# Patient Record
Sex: MALE | Race: WHITE | NOT HISPANIC OR LATINO | Employment: OTHER | URBAN - METROPOLITAN AREA
[De-identification: names, ages, dates, MRNs, and addresses within clinical notes are randomized per-mention and may not be internally consistent; named-entity substitution may affect disease eponyms.]

---

## 2017-01-19 ENCOUNTER — TELEPHONE (OUTPATIENT)
Dept: FAMILY MEDICINE CLINIC | Facility: CLINIC | Age: 72
End: 2017-01-19

## 2017-01-19 NOTE — TELEPHONE ENCOUNTER
I LET PT KNOW WE DID NOT PURPOSELY CHANGE THE DOSING DIRECTIONS AND IF HE NORMALLY TAKES 2 A DAY HE NEEDS TO KEEP DOING SO. HE WILL CALL ME BACK IN ABOUT 30 DAYS TO RESEND ANOTHER RX TO HIS MAIL ORDER.    ----- Message from Cyndee Martinez sent at 1/18/2017  9:33 AM EST -----  DIRECTIONS HAD CHANGED ON MAIL ORDER RX     SAYS 1 TAB PER DAY ( JANUMET XR )    HE ALWAYS TAKES 2 A DAY     DID DR CHANG CHANGED THIS BC HE WAS NOT NOTIFIED     PLEASE CALL HIM TO DISCUSS THIS ISSUE     [phone]

## 2017-02-21 ENCOUNTER — TRANSCRIBE ORDERS (OUTPATIENT)
Dept: ADMINISTRATIVE | Facility: HOSPITAL | Age: 72
End: 2017-02-21

## 2017-02-21 ENCOUNTER — APPOINTMENT (OUTPATIENT)
Dept: LAB | Facility: HOSPITAL | Age: 72
End: 2017-02-21

## 2017-02-21 ENCOUNTER — HOSPITAL ENCOUNTER (OUTPATIENT)
Dept: CARDIOLOGY | Facility: HOSPITAL | Age: 72
Discharge: HOME | End: 2017-02-21
Admitting: ANESTHESIOLOGY

## 2017-02-21 DIAGNOSIS — N20.0 CALCULUS OF KIDNEY: Primary | ICD-10-CM

## 2017-02-21 LAB
ANION GAP SERPL CALCULATED.3IONS-SCNC: 10.9 MMOL/L
BUN BLD-MCNC: 18 MG/DL (ref 8–23)
BUN/CREAT SERPL: 24.7 (ref 7–25)
CALCIUM SPEC-SCNC: 9.4 MG/DL (ref 8.6–10.5)
CHLORIDE SERPL-SCNC: 103 MMOL/L (ref 98–107)
CO2 SERPL-SCNC: 26.1 MMOL/L (ref 22–29)
CREAT BLD-MCNC: 0.73 MG/DL (ref 0.76–1.27)
GFR SERPL CREATININE-BSD FRML MDRD: 107 ML/MIN/1.73
GLUCOSE BLD-MCNC: 164 MG/DL (ref 65–99)
POTASSIUM BLD-SCNC: 4.4 MMOL/L (ref 3.5–5.2)
SODIUM BLD-SCNC: 140 MMOL/L (ref 136–145)

## 2017-02-21 PROCEDURE — 36415 COLL VENOUS BLD VENIPUNCTURE: CPT | Performed by: ANESTHESIOLOGY

## 2017-02-21 PROCEDURE — 93010 ELECTROCARDIOGRAM REPORT: CPT | Performed by: INTERNAL MEDICINE

## 2017-02-21 PROCEDURE — 93005 ELECTROCARDIOGRAM TRACING: CPT

## 2017-02-21 PROCEDURE — 80048 BASIC METABOLIC PNL TOTAL CA: CPT | Performed by: ANESTHESIOLOGY

## 2017-02-22 ENCOUNTER — HOSPITAL ENCOUNTER (OUTPATIENT)
Dept: OTHER | Facility: HOSPITAL | Age: 72
Setting detail: SPECIMEN
Discharge: HOME | End: 2017-02-22
Attending: UROLOGY | Admitting: UROLOGY

## 2017-02-22 LAB — SPECIMEN SOURCE: NORMAL

## 2017-04-19 ENCOUNTER — TRANSCRIBE ORDERS (OUTPATIENT)
Dept: ADMINISTRATIVE | Facility: HOSPITAL | Age: 72
End: 2017-04-19

## 2017-04-19 DIAGNOSIS — Z13.9 SCREENING: Primary | ICD-10-CM

## 2017-05-10 ENCOUNTER — HOSPITAL ENCOUNTER (OUTPATIENT)
Dept: CARDIOLOGY | Facility: HOSPITAL | Age: 72
Discharge: HOME | End: 2017-05-10

## 2017-05-10 VITALS
WEIGHT: 228 LBS | HEIGHT: 72 IN | SYSTOLIC BLOOD PRESSURE: 123 MMHG | DIASTOLIC BLOOD PRESSURE: 71 MMHG | HEART RATE: 90 BPM | BODY MASS INDEX: 30.88 KG/M2

## 2017-05-10 DIAGNOSIS — Z13.9 SCREENING: ICD-10-CM

## 2017-05-10 LAB
BH CV ECHO MEAS - DIST AO DIAM: 1.47 CM
BH CV VAS BP LEFT ARM: NORMAL MMHG
BH CV VAS BP RIGHT ARM: NORMAL MMHG
BH CV XLRA MEAS - MID AO DIAM: 1.69 CM
BH CV XLRA MEAS - PAD LEFT ABI DP: 1.08
BH CV XLRA MEAS - PAD LEFT ABI PT: 1.1
BH CV XLRA MEAS - PAD LEFT ARM: 122 MMHG
BH CV XLRA MEAS - PAD LEFT LEG DP: 134 MMHG
BH CV XLRA MEAS - PAD LEFT LEG PT: 136 MMHG
BH CV XLRA MEAS - PAD RIGHT ABI DP: 1.1
BH CV XLRA MEAS - PAD RIGHT ABI PT: 1.12
BH CV XLRA MEAS - PAD RIGHT ARM: 123 MMHG
BH CV XLRA MEAS - PAD RIGHT LEG DP: 136 MMHG
BH CV XLRA MEAS - PAD RIGHT LEG PT: 138 MMHG
BH CV XLRA MEAS - PROX AO DIAM: 1.87 CM
BH CV XLRA MEAS LEFT ICA/CCA RATIO: 0.71
BH CV XLRA MEAS LEFT MID CCA PSV: NORMAL CM/SEC
BH CV XLRA MEAS LEFT MID ICA PSV: NORMAL CM/SEC
BH CV XLRA MEAS LEFT PROX ECA PSV: NORMAL CM/SEC
BH CV XLRA MEAS RIGHT ICA/CCA RATIO: 0.71
BH CV XLRA MEAS RIGHT MID CCA PSV: NORMAL CM/SEC
BH CV XLRA MEAS RIGHT MID ICA PSV: NORMAL CM/SEC
BH CV XLRA MEAS RIGHT PROX ECA PSV: NORMAL CM/SEC

## 2017-05-10 PROCEDURE — 93799 UNLISTED CV SVC/PROCEDURE: CPT

## 2017-05-11 ENCOUNTER — TELEPHONE (OUTPATIENT)
Dept: FAMILY MEDICINE CLINIC | Facility: CLINIC | Age: 72
End: 2017-05-11

## 2017-06-02 DIAGNOSIS — Z00.00 ANNUAL PHYSICAL EXAM: Primary | ICD-10-CM

## 2017-06-05 ENCOUNTER — CLINICAL SUPPORT (OUTPATIENT)
Dept: FAMILY MEDICINE CLINIC | Facility: CLINIC | Age: 72
End: 2017-06-05

## 2017-06-05 DIAGNOSIS — E11.9 TYPE 2 DIABETES MELLITUS WITHOUT COMPLICATION, UNSPECIFIED LONG TERM INSULIN USE STATUS: Primary | ICD-10-CM

## 2017-06-05 DIAGNOSIS — Z00.00 ROUTINE HEALTH MAINTENANCE: Primary | ICD-10-CM

## 2017-06-05 DIAGNOSIS — I10 HYPERTENSION, ESSENTIAL, BENIGN: ICD-10-CM

## 2017-06-05 LAB
ALBUMIN SERPL-MCNC: 5 G/DL (ref 3.5–5.2)
ALBUMIN/GLOB SERPL: 2.4 G/DL
ALP SERPL-CCNC: 71 U/L (ref 39–117)
ALT SERPL-CCNC: 18 U/L (ref 1–41)
APPEARANCE UR: CLEAR
AST SERPL-CCNC: 15 U/L (ref 1–40)
BACTERIA #/AREA URNS HPF: NORMAL /HPF
BILIRUB SERPL-MCNC: 0.8 MG/DL (ref 0.1–1.2)
BILIRUB UR QL STRIP: NEGATIVE
BUN SERPL-MCNC: 17 MG/DL (ref 8–23)
BUN/CREAT SERPL: 22.7 (ref 7–25)
CALCIUM SERPL-MCNC: 10.2 MG/DL (ref 8.6–10.5)
CASTS URNS MICRO: NORMAL
CHLORIDE SERPL-SCNC: 97 MMOL/L (ref 98–107)
CHOLEST SERPL-MCNC: 158 MG/DL (ref 0–200)
CO2 SERPL-SCNC: 26.7 MMOL/L (ref 22–29)
COLOR UR: YELLOW
CREAT SERPL-MCNC: 0.75 MG/DL (ref 0.76–1.27)
EPI CELLS #/AREA URNS HPF: NORMAL /HPF
GLOBULIN SER CALC-MCNC: 2.1 GM/DL
GLUCOSE SERPL-MCNC: 198 MG/DL (ref 65–99)
GLUCOSE UR QL: (no result)
HBA1C MFR BLD: 7.8 %
HDLC SERPL-MCNC: 28 MG/DL (ref 40–60)
HGB UR QL STRIP: NEGATIVE
KETONES UR QL STRIP: NEGATIVE
LDLC SERPL CALC-MCNC: 61 MG/DL (ref 0–100)
LDLC/HDLC SERPL: 2.19 {RATIO}
LEUKOCYTE ESTERASE UR QL STRIP: NEGATIVE
NITRITE UR QL STRIP: NEGATIVE
PH UR STRIP: 6 [PH] (ref 5–8)
POTASSIUM SERPL-SCNC: 4.4 MMOL/L (ref 3.5–5.2)
PROT SERPL-MCNC: 7.1 G/DL (ref 6–8.5)
PROT UR QL STRIP: NEGATIVE
RBC #/AREA URNS HPF: NORMAL /HPF
SODIUM SERPL-SCNC: 138 MMOL/L (ref 136–145)
SP GR UR: 1.02 (ref 1–1.03)
TRIGL SERPL-MCNC: 344 MG/DL (ref 0–150)
UROBILINOGEN UR STRIP-MCNC: (no result) MG/DL
VLDLC SERPL CALC-MCNC: 68.8 MG/DL (ref 5–40)
WBC #/AREA URNS HPF: NORMAL /HPF

## 2017-06-05 PROCEDURE — 71020 XR CHEST PA AND LATERAL: CPT

## 2017-06-05 PROCEDURE — 85025 COMPLETE CBC W/AUTO DIFF WBC: CPT

## 2017-06-05 PROCEDURE — 93000 ELECTROCARDIOGRAM COMPLETE: CPT

## 2017-06-05 PROCEDURE — 83036 HEMOGLOBIN GLYCOSYLATED A1C: CPT

## 2017-06-28 ENCOUNTER — OFFICE VISIT (OUTPATIENT)
Dept: FAMILY MEDICINE CLINIC | Facility: CLINIC | Age: 72
End: 2017-06-28

## 2017-06-28 VITALS
OXYGEN SATURATION: 97 % | DIASTOLIC BLOOD PRESSURE: 64 MMHG | SYSTOLIC BLOOD PRESSURE: 152 MMHG | BODY MASS INDEX: 31.15 KG/M2 | HEIGHT: 72 IN | WEIGHT: 230 LBS | RESPIRATION RATE: 18 BRPM | HEART RATE: 93 BPM | TEMPERATURE: 97.5 F

## 2017-06-28 DIAGNOSIS — E11.40 TYPE 2 DIABETES MELLITUS WITH DIABETIC NEUROPATHY, WITHOUT LONG-TERM CURRENT USE OF INSULIN (HCC): ICD-10-CM

## 2017-06-28 DIAGNOSIS — E78.2 MIXED HYPERLIPIDEMIA: ICD-10-CM

## 2017-06-28 DIAGNOSIS — Z00.00 ANNUAL PHYSICAL EXAM: ICD-10-CM

## 2017-06-28 DIAGNOSIS — E11.42 DIABETIC PERIPHERAL NEUROPATHY (HCC): ICD-10-CM

## 2017-06-28 DIAGNOSIS — D72.828 GRANULOCYTOSIS: ICD-10-CM

## 2017-06-28 DIAGNOSIS — Z00.00 HEALTH MAINTENANCE EXAMINATION: Primary | ICD-10-CM

## 2017-06-28 DIAGNOSIS — K80.20 ASYMPTOMATIC CHOLELITHIASIS: ICD-10-CM

## 2017-06-28 DIAGNOSIS — I10 BENIGN ESSENTIAL HYPERTENSION: ICD-10-CM

## 2017-06-28 LAB — HEMOCCULT STL QL IA: NEGATIVE

## 2017-06-28 PROCEDURE — 82274 ASSAY TEST FOR BLOOD FECAL: CPT

## 2017-06-28 PROCEDURE — 99397 PER PM REEVAL EST PAT 65+ YR: CPT

## 2017-06-28 NOTE — PROGRESS NOTES
Jan Paiz is a 72 y.o. male. Patient is here today for   Chief Complaint   Patient presents with   • Annual Exam          Vitals:    06/28/17 1025   BP: 152/64   Pulse: 93   Resp: 18   Temp: 97.5 °F (36.4 °C)   SpO2: 97%       The following portions of the patient's history were reviewed and updated as appropriate: allergies, current medications, past family history, past medical history, past social history, past surgical history and problem list.    Past Medical History:   Diagnosis Date   • Benign essential hypertension    • Cholelithiasis    • Colon polyp    • Diabetes mellitus    • Fatigue    • H/O pulmonary emphysema    • Hyperlipidemia    • Leukocytosis       Allergies   Allergen Reactions   • Sulfa Antibiotics       Social History     Social History   • Marital status:      Spouse name: N/A   • Number of children: N/A   • Years of education: N/A     Occupational History   • Not on file.     Social History Main Topics   • Smoking status: Current Every Day Smoker     Packs/day: 2.00     Types: Cigarettes   • Smokeless tobacco: Not on file   • Alcohol use Yes   • Drug use: Not on file   • Sexual activity: Not on file     Other Topics Concern   • Not on file     Social History Narrative        Current Outpatient Prescriptions:   •  Canagliflozin (INVOKANA) 300 MG capsule, Take 300 mg by mouth daily., Disp: 90 tablet, Rfl: 3  •  Coenzyme Q10 (COQ-10) 400 MG capsule, Take by mouth., Disp: , Rfl:   •  losartan (COZAAR) 100 MG tablet, Take 1 tablet by mouth daily., Disp: 90 tablet, Rfl: 3  •  lovastatin (MEVACOR) 20 MG tablet, Take 1 tablet by mouth daily. 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        Mild fatigue   HENT: Negative for congestion, ear pain, nosebleeds, postnasal drip, rhinorrhea and sore throat.    Eyes: Negative for photophobia and visual disturbance.   Respiratory: Negative for cough, chest tightness, shortness of breath and wheezing.    Cardiovascular: Negative for chest pain, palpitations and leg swelling.   Gastrointestinal: Negative for blood in stool, constipation, diarrhea, nausea and vomiting.        The patient describes a very vague mild abdominal discomfort.  Patient has had a normal colonoscopy and CT of the abdomen in the last year or so.   Genitourinary:        Increased urinary frequency in the morning after taking his diabetic medications.  History of kidney stone but the patient has no symptoms currently and has no hematuria   Musculoskeletal:        Mild to moderate  osteoarthritic aches and pains   Neurological:        The patient does have some burning sensation and discomfort in his feet, primarily at night which is probably secondary to diabetic peripheral neuropathy.  This does not bother him during the day.   Hematological: Negative for adenopathy. Does not bruise/bleed easily.   Psychiatric/Behavioral: Negative.        Physical Exam   Constitutional: He is oriented to person, place, and time. He appears well-developed and well-nourished.   Overweight with central obesity   HENT:   Head: Normocephalic.   Right Ear: External ear normal.   Left Ear: External ear normal.   Nose: Nose normal.   Mouth/Throat: Oropharynx is clear and moist.   Eyes: Conjunctivae and EOM are normal. Pupils are equal, round, and reactive to light. No scleral icterus.   Neck:  Normal range of motion. Neck supple. No thyromegaly present.   Carotid pulses normal   Cardiovascular: Normal rate, regular rhythm, normal heart sounds and intact distal pulses.    No murmur heard.  Pulmonary/Chest: Effort normal and breath sounds normal. No respiratory distress. He has no wheezes. He has no rales.   Abdominal: Soft. Bowel sounds are normal. He exhibits no distension and no mass. There is no guarding.   No epigastric or right upper quadrant tenderness.   mild abdominal tenderness in the mid to low abdomen   Genitourinary: Rectum normal, prostate normal and penis normal. Rectal exam shows guaiac negative stool.   Musculoskeletal:   Osteoarthritic changes in multiple joints   Lymphadenopathy:     He has no cervical adenopathy.   Neurological: He is alert and oriented to person, place, and time. He has normal reflexes.   Sensation intact in feet   Skin: Skin is warm and dry.   Psychiatric: He has a normal mood and affect.   Nursing note and vitals reviewed.      ASSESSMENT  #1 annual physical examination             #2 type 2 diabetes mellitus              #3 essential hypertension         #4 mixed hyperlipidemia           #5 mild, early diabetic peripheral neuropathy          #6 asymptomatic cholelithiasis           #7 history of kidney stones            #8 osteoarthritis of multiple sites    DISCUSSION/SUMMARY  Initially the patient's blood pressure was elevated at 152 over 64.  Upon recheck the blood pressure was down to 140/60.  The patient states that his home blood pressures are usually about 1:30 systolic and I have told the patient to monitor his blood pressure on a regular basis and if his blood pressure exceeds 135 on the average he will call us.  The patient's chest x-ray shows no active disease and no change from previous chest x-ray done in 2014.  In the interim the patient has had a low dose CT screening of the chest because of history of cigarette smoking, which was normal except for  mild emphysema.  The patient's EKG does show a right bundle branch block and left anterior fascicular block but was otherwise normal and shows no change from one done last year.  Although the patient has cut his cigarette use down he continues to smoke.  The patient's diet is fair and he admits that it has not been ideal over the last several months.  The patient does continue to  exercise  about 3 days a week.  Total cholesterol is 158, triglycerides are elevated at 344, HDL cholesterol low at 28, and LDL cholesterol is 61.  The patient will continue his present dose of lovastatin but will work harder on his diet to try to reduce his triglycerides.  CMP shows an elevated fasting blood sugar of 198, which is higher than 6 months ago.  His hemoglobin A1c is also elevated at 7.8%, which is higher than it was 6 months ago when it was 6.83%.  The patient admits that his diet over the last few months is been far less than ideal and he will make every attempt to improve this.  Urinalysis was normal except for some sugar in the urine which is expected since the patient is on Invokana.  The patient's total white blood cell count is in the normal range and he has a very slight right shift on his differential.  The patient's hemoglobin is 17.5 which is down from 18.2 on last visit.  A PSA was not done because he had one done in December 2016 and it was normal at 1.250.  The patient has expressed some concern over recent reports that Invokana increases the chance for amputation and, because of this, I have asked him to check into his insurance coverage for the 2 other medications in this class.  The patient's last colonoscopy was in July 2014 and was normal.  Vascular screening test done within the last 2 months were all normal.  The patient is unsure of his immunizations and will check at home to see what he has had.  I recommended to the patient that he restart his low-dose, 81 mg, aspirin that he was taken off prior to his  kidney stone surgery.    PLAN  Recheck in 6 months with fasting CMP, lipid panel, hemoglobin A1c, PSA, hepatitis C antibody screening, urine for microalbumin and CBC    No Follow-up on file.

## 2017-07-05 ENCOUNTER — TELEPHONE (OUTPATIENT)
Dept: FAMILY MEDICINE CLINIC | Facility: CLINIC | Age: 72
End: 2017-07-05

## 2017-07-05 NOTE — TELEPHONE ENCOUNTER
Patient aware.     ----- Message from Momo Macdonald MD sent at 7/5/2017 10:47 AM EDT -----  Contact: PT   I would have him take 10 mg of Farxiga daily, which is equivalent to 300 mg of the Invokana.  Before getting a prescription filled have him use samples we have  ----- Message -----     From: Jana Lomeli MA     Sent: 6/30/2017   4:25 PM       To: Momo Macdonald MD    What dose Farxiga would you like to start him on?    ----- Message -----     From: Cheikh Sweet MA     Sent: 6/30/2017  10:31 AM       To: Jana Lomeli MA    Pt would like to change Canagliflozin (INVOKANA) 300 MG capsule to Farxiga as discussed with Dr. Macdonald at his physical appt. Pt states has called insurance company and stated it's the same price as Invokana. Would like it sent to [facility]. If have any questions please call back at [phone].

## 2017-08-01 RX ORDER — LOVASTATIN 20 MG/1
20 TABLET ORAL DAILY
Qty: 90 TABLET | Refills: 3 | Status: SHIPPED | OUTPATIENT
Start: 2017-08-01 | End: 2018-07-24 | Stop reason: SDUPTHER

## 2017-08-01 RX ORDER — LOSARTAN POTASSIUM 100 MG/1
100 TABLET ORAL DAILY
Qty: 90 TABLET | Refills: 3 | Status: SHIPPED | OUTPATIENT
Start: 2017-08-01 | End: 2017-08-09 | Stop reason: SDUPTHER

## 2017-08-09 RX ORDER — LOSARTAN POTASSIUM 100 MG/1
100 TABLET ORAL DAILY
Qty: 10 TABLET | Refills: 0 | Status: SHIPPED | OUTPATIENT
Start: 2017-08-09 | End: 2018-01-12 | Stop reason: ALTCHOICE

## 2017-10-16 ENCOUNTER — TELEPHONE (OUTPATIENT)
Dept: FAMILY MEDICINE CLINIC | Facility: CLINIC | Age: 72
End: 2017-10-16

## 2017-10-16 RX ORDER — VALSARTAN 160 MG/1
160 TABLET ORAL DAILY
Qty: 90 TABLET | Refills: 0 | Status: SHIPPED | OUTPATIENT
Start: 2017-10-16 | End: 2018-01-12 | Stop reason: ALTCHOICE

## 2017-10-16 NOTE — TELEPHONE ENCOUNTER
SENT RX TO [facility]. TRIED TO CALL PATIENT A COUPLE TIMES AND COULD NOT REACH HIM.    ----- Message from Momo Macdonald MD sent at 10/10/2017  1:38 PM EDT -----  Contact: PATIENT  The patient may have valsartan 160 mg tablets, one daily to take in place of the losartan  ----- Message -----     From: Jana Lomeli MA     Sent: 9/22/2017   3:55 PM       To: Momo Macdonald MD    PATIENT CALLED TO LET YOU KNOW HE CHECKED WITH HIS INSURANCE AND THEY SUGGESTED THE FOLLOWING ALTERNATIVES: VALSARTAN, VALSARTAN HCTZ, OR IRBESARTAN (HE IS CURRENTLY ON LOSARTAN).

## 2018-01-04 DIAGNOSIS — E11.9 TYPE 2 DIABETES MELLITUS WITHOUT COMPLICATION, UNSPECIFIED LONG TERM INSULIN USE STATUS: ICD-10-CM

## 2018-01-04 DIAGNOSIS — Z12.5 SPECIAL SCREENING FOR MALIGNANT NEOPLASM OF PROSTATE: ICD-10-CM

## 2018-01-04 DIAGNOSIS — Z11.59 NEED FOR HEPATITIS C SCREENING TEST: ICD-10-CM

## 2018-01-04 DIAGNOSIS — E78.5 HYPERLIPIDEMIA, UNSPECIFIED HYPERLIPIDEMIA TYPE: ICD-10-CM

## 2018-01-04 DIAGNOSIS — I10 ESSENTIAL HYPERTENSION: Primary | ICD-10-CM

## 2018-01-08 LAB
ALBUMIN SERPL-MCNC: 5 G/DL (ref 3.5–5.2)
ALBUMIN/CREAT UR: 135.4 MG/G CREAT (ref 0–30)
ALBUMIN/GLOB SERPL: 2.3 G/DL
ALP SERPL-CCNC: 71 U/L (ref 39–117)
ALT SERPL-CCNC: 19 U/L (ref 1–41)
AST SERPL-CCNC: 14 U/L (ref 1–40)
BASOPHILS # BLD AUTO: 0.05 10*3/MM3 (ref 0–0.2)
BASOPHILS NFR BLD AUTO: 0.5 % (ref 0–1.5)
BILIRUB SERPL-MCNC: 0.8 MG/DL (ref 0.1–1.2)
BUN SERPL-MCNC: 21 MG/DL (ref 8–23)
BUN/CREAT SERPL: 23.9 (ref 7–25)
CALCIUM SERPL-MCNC: 9.8 MG/DL (ref 8.6–10.5)
CHLORIDE SERPL-SCNC: 98 MMOL/L (ref 98–107)
CHOLEST SERPL-MCNC: 172 MG/DL (ref 0–200)
CO2 SERPL-SCNC: 29.2 MMOL/L (ref 22–29)
CREAT SERPL-MCNC: 0.88 MG/DL (ref 0.76–1.27)
CREAT UR-MCNC: 14.7 MG/DL
EOSINOPHIL # BLD AUTO: 0.49 10*3/MM3 (ref 0–0.7)
EOSINOPHIL NFR BLD AUTO: 4.7 % (ref 0.3–6.2)
ERYTHROCYTE [DISTWIDTH] IN BLOOD BY AUTOMATED COUNT: 13.4 % (ref 11.5–14.5)
GLOBULIN SER CALC-MCNC: 2.2 GM/DL
GLUCOSE SERPL-MCNC: 186 MG/DL (ref 65–99)
HBA1C MFR BLD: 7.8 % (ref 4.8–5.6)
HCT VFR BLD AUTO: 53.9 % (ref 40.4–52.2)
HCV AB S/CO SERPL IA: <0.1 S/CO RATIO (ref 0–0.9)
HDLC SERPL-MCNC: 26 MG/DL (ref 40–60)
HGB BLD-MCNC: 18.5 G/DL (ref 13.7–17.6)
IMM GRANULOCYTES # BLD: 0.04 10*3/MM3 (ref 0–0.03)
IMM GRANULOCYTES NFR BLD: 0.4 % (ref 0–0.5)
LDLC SERPL CALC-MCNC: ABNORMAL MG/DL
LDLC/HDLC SERPL: ABNORMAL {RATIO}
LYMPHOCYTES # BLD AUTO: 2.26 10*3/MM3 (ref 0.9–4.8)
LYMPHOCYTES NFR BLD AUTO: 21.5 % (ref 19.6–45.3)
MCH RBC QN AUTO: 34.2 PG (ref 27–32.7)
MCHC RBC AUTO-ENTMCNC: 34.3 G/DL (ref 32.6–36.4)
MCV RBC AUTO: 99.6 FL (ref 79.8–96.2)
MICROALBUMIN UR-MCNC: 19.9 UG/ML
MONOCYTES # BLD AUTO: 0.83 10*3/MM3 (ref 0.2–1.2)
MONOCYTES NFR BLD AUTO: 7.9 % (ref 5–12)
NEUTROPHILS # BLD AUTO: 6.84 10*3/MM3 (ref 1.9–8.1)
NEUTROPHILS NFR BLD AUTO: 65 % (ref 42.7–76)
PLATELET # BLD AUTO: 161 10*3/MM3 (ref 140–500)
POTASSIUM SERPL-SCNC: 5 MMOL/L (ref 3.5–5.2)
PROT SERPL-MCNC: 7.2 G/DL (ref 6–8.5)
PSA SERPL-MCNC: 1.07 NG/ML (ref 0–4)
RBC # BLD AUTO: 5.41 10*6/MM3 (ref 4.6–6)
SODIUM SERPL-SCNC: 140 MMOL/L (ref 136–145)
TRIGL SERPL-MCNC: 406 MG/DL (ref 0–150)
VLDLC SERPL CALC-MCNC: ABNORMAL MG/DL
WBC # BLD AUTO: 10.51 10*3/MM3 (ref 4.5–10.7)

## 2018-01-12 ENCOUNTER — OFFICE VISIT (OUTPATIENT)
Dept: FAMILY MEDICINE CLINIC | Facility: CLINIC | Age: 73
End: 2018-01-12

## 2018-01-12 VITALS
SYSTOLIC BLOOD PRESSURE: 142 MMHG | RESPIRATION RATE: 18 BRPM | OXYGEN SATURATION: 98 % | HEIGHT: 72 IN | DIASTOLIC BLOOD PRESSURE: 74 MMHG | WEIGHT: 233 LBS | TEMPERATURE: 97.9 F | HEART RATE: 92 BPM | BODY MASS INDEX: 31.56 KG/M2

## 2018-01-12 DIAGNOSIS — D75.1 POLYCYTHEMIA SECONDARY TO SMOKING: ICD-10-CM

## 2018-01-12 DIAGNOSIS — E11.42 DIABETIC PERIPHERAL NEUROPATHY (HCC): ICD-10-CM

## 2018-01-12 DIAGNOSIS — E11.40 TYPE 2 DIABETES MELLITUS WITH DIABETIC NEUROPATHY, WITHOUT LONG-TERM CURRENT USE OF INSULIN (HCC): Primary | ICD-10-CM

## 2018-01-12 DIAGNOSIS — I10 BENIGN ESSENTIAL HYPERTENSION: ICD-10-CM

## 2018-01-12 DIAGNOSIS — E78.2 MIXED HYPERLIPIDEMIA: ICD-10-CM

## 2018-01-12 PROCEDURE — 99214 OFFICE O/P EST MOD 30 MIN: CPT

## 2018-01-12 RX ORDER — VALSARTAN 320 MG/1
320 TABLET ORAL DAILY
COMMUNITY
End: 2018-01-19 | Stop reason: SDUPTHER

## 2018-01-12 RX ORDER — GABAPENTIN 100 MG/1
CAPSULE ORAL
Qty: 90 CAPSULE | Refills: 5 | Status: SHIPPED | OUTPATIENT
Start: 2018-01-12 | End: 2018-06-07 | Stop reason: ALTCHOICE

## 2018-01-12 NOTE — PROGRESS NOTES
Jan Paiz is a 72 y.o. male. Patient is here today for   Chief Complaint   Patient presents with   • Diabetes   • Hyperlipidemia   • Hypertension          Vitals:    01/12/18 0855   BP: 142/74   Pulse: 92   Resp: 18   Temp: 97.9 °F (36.6 °C)   SpO2: 98%     The following portions of the patient's history were reviewed and updated as appropriate: allergies, current medications, past family history, past medical history, past social history, past surgical history and problem list.    Past Medical History:   Diagnosis Date   • Benign essential hypertension    • Cholelithiasis    • Colon polyp    • Diabetes mellitus    • Fatigue    • H/O pulmonary emphysema    • Hyperlipidemia    • Leukocytosis       Allergies   Allergen Reactions   • Sulfa Antibiotics       Social History     Social History   • Marital status:      Spouse name: N/A   • Number of children: N/A   • Years of education: N/A     Occupational History   • Not on file.     Social History Main Topics   • Smoking status: Current Every Day Smoker     Packs/day: 2.00     Types: Cigarettes   • Smokeless tobacco: Not on file   • Alcohol use Yes   • Drug use: Not on file   • Sexual activity: Not on file     Other Topics Concern   • Not on file     Social History Narrative        Current Outpatient Prescriptions:   •  Canagliflozin (INVOKANA) 300 MG capsule, Take 300 mg by mouth daily., Disp: 90 tablet, Rfl: 3  •  Coenzyme Q10 (COQ-10) 400 MG capsule, Take by mouth., Disp: , Rfl:   •  Dapagliflozin Propanediol (FARXIGA) 10 MG tablet, Take 10 mg by mouth Daily., Disp: 90 tablet, Rfl: 3  •  lovastatin (MEVACOR) 20 MG tablet, Take 1 tablet by mouth Daily. In the evening before dinner, Disp: 90 tablet, Rfl: 3  •  Magnesium 250 MG tablet, Take  by mouth., Disp: , Rfl:   •  MULTIPLE VITAMINS PO, Take by mouth., Disp: , Rfl:   •  SITagliptin-MetFORMIN HCl ER (JANUMET XR)  MG tablet sustained-release 24 hour, TAKE 2 TABLETS BY MOUTH DAILY,  Disp: 180 tablet, Rfl: 3  •  gabapentin (NEURONTIN) 100 MG capsule, TAKE 3 CAPSULES AT BED TIME, Disp: 90 capsule, Rfl: 5  •  valsartan (DIOVAN) 320 MG tablet, Take 320 mg by mouth Daily., Disp: , Rfl:      Objective     History of Present Illness   The patient is here today for follow-up on type 2 diabetes mellitus, essential hypertension, mixed hyperlipidemia and diabetic peripheral neuropathy    Review of Systems   Constitutional:        Mild fatigue   HENT: Negative.    Respiratory: Negative for cough, shortness of breath and wheezing.    Cardiovascular: Negative for chest pain, palpitations and leg swelling.   Gastrointestinal: Negative for abdominal pain, blood in stool, constipation, diarrhea and nausea.   Genitourinary: Negative.    Musculoskeletal:        Mild arthritic aches and pains   Neurological:        The patient has pain in his feet, tingling in his feet, and occasional sharp pains in that area which are thought to be peripheral neuropathy from his diabetes but this has never been checked out with EMG   Hematological: Negative.    Psychiatric/Behavioral: Negative.        Physical Exam   Constitutional: He is oriented to person, place, and time.   Moderately overweight   Eyes: Pupils are equal, round, and reactive to light.   Neck:   Carotid pulses normal   Cardiovascular: Normal rate, regular rhythm and normal heart sounds.    Pulmonary/Chest: Effort normal and breath sounds normal. No respiratory distress. He has no wheezes. He has no rales.   Abdominal: Soft. Bowel sounds are normal.   Musculoskeletal:   Mild osteoarthritic changes in multiple joints   Neurological: He is alert and oriented to person, place, and time.   Slight decreased sensation in feet   Skin: Skin is warm and dry.   Psychiatric: He has a normal mood and affect.   Nursing note and vitals reviewed.      ASSESSMENT  #1 type 2 diabetes mellitus              #2 essential hypertension                #3 mixed hyperlipidemia             #4 probable diabetic peripheral neuropathy           #5 polycythemia ,probably secondary to chronic smoking    DISCUSSION/SUMMARY   The patient's blood pressure is 142/74.  The patient's losartan 100 mg daily was changed to valsartan 160 mg daily but his blood pressure is still a little too high.  I am going to increase his valsartan to 320 mg daily.  CMP is essentially normal except for fasting blood sugar of 186 and the patient's hemoglobin A1c is still elevated at 7.80%.  The patient states that his diet is been poor over the holidays and he wants to work harder on his diet and we will recheck this again in 6 months.  PSA is normal at 1.070.  Hepatitis C antibody titer was normal.  Total cholesterol is 172, triglycerides 46, HDL cholesterol 26 and the LDL cannot be calculated.  Urine for microalbumin was elevated but the patient is already on an angiotensin receptor blocker.  The patient generally feels well other than his chronic foot pain and tingling.  This is particularly bad at night for him.  The patient would like to try some medication so I will start him on gabapentin 100 mg at bedtime and increase this by 100 mg every week for several weeks to see if this helps.  The patient will monitor his blood pressure and let us know if the blood pressures come down and if he has any improvement with the gabapentin.  We talked about side effects from the gabapentin.  I'm going to schedule the patient for an EMG of his lower extremities.    PLAN  Annual physical exam in 6 months.  Set up EMG nerve conduction study  No Follow-up on file.

## 2018-01-19 ENCOUNTER — TELEPHONE (OUTPATIENT)
Dept: FAMILY MEDICINE CLINIC | Facility: CLINIC | Age: 73
End: 2018-01-19

## 2018-01-19 RX ORDER — VALSARTAN 320 MG/1
320 TABLET ORAL DAILY
Qty: 90 TABLET | Refills: 1 | Status: SHIPPED | OUTPATIENT
Start: 2018-01-19 | End: 2018-07-24 | Stop reason: SDUPTHER

## 2018-01-19 NOTE — TELEPHONE ENCOUNTER
LEFT VOICEMAIL STATING THAT PATIENT IS SUPPOSED TO INCREASE HIS VALSARTAN TO BID UNTIL HE RUNS OUT AND ALSO THAT I WOULD BE SENDING A NEW RX TO [facility] FOR THIS (VALSARTAN 320 MG)    ----- Message from Cheikh Sweet MA sent at 1/18/2018  3:55 PM EST -----  Contact: PT'S WIFE   PT'S WIFES STATES PT WAS SUPPOSED TO GET A REFILL ON HTN MEDICATIONS. PT'S WIFE STATES THINKS HE IS SUPPOSED TO HAVE DOUBLED IT. PLEASE CALL PT BACK @ [phone], MARICEL LORD. THANK YOU [facility].

## 2018-01-30 DIAGNOSIS — G58.9 MONONEUROPATHY: ICD-10-CM

## 2018-01-30 DIAGNOSIS — G62.9 NEUROPATHY: ICD-10-CM

## 2018-01-30 DIAGNOSIS — G62.9 PERIPHERAL POLYNEUROPATHY: Primary | ICD-10-CM

## 2018-01-30 DIAGNOSIS — G62.89 OTHER POLYNEUROPATHY: ICD-10-CM

## 2018-01-31 DIAGNOSIS — G62.9 POLYNEUROPATHY: Primary | ICD-10-CM

## 2018-01-31 DIAGNOSIS — G63 POLYNEUROPATHY IN OTHER DISEASES CLASSIFIED ELSEWHERE (HCC): ICD-10-CM

## 2018-02-14 ENCOUNTER — HOSPITAL ENCOUNTER (OUTPATIENT)
Dept: INFUSION THERAPY | Facility: HOSPITAL | Age: 73
Discharge: HOME | End: 2018-02-14
Attending: PSYCHIATRY & NEUROLOGY | Admitting: PSYCHIATRY & NEUROLOGY

## 2018-02-14 DIAGNOSIS — G63 POLYNEUROPATHY IN OTHER DISEASES CLASSIFIED ELSEWHERE (HCC): ICD-10-CM

## 2018-02-14 PROCEDURE — 95886 MUSC TEST DONE W/N TEST COMP: CPT

## 2018-02-14 PROCEDURE — 95910 NRV CNDJ TEST 7-8 STUDIES: CPT | Performed by: PSYCHIATRY & NEUROLOGY

## 2018-02-14 PROCEDURE — 95910 NRV CNDJ TEST 7-8 STUDIES: CPT

## 2018-02-14 PROCEDURE — 95886 MUSC TEST DONE W/N TEST COMP: CPT | Performed by: PSYCHIATRY & NEUROLOGY

## 2018-02-14 NOTE — PROCEDURES
EMG and Nerve Conduction Studies    Please see data sheets for details on methods, temperatures and lab standards. EMG muscles tested for upper extremity studies include the deltoid, biceps, triceps, pronator teres, extensor digitorum communis, first dorsal interosseous and abductor pollicis brevis.  EMG muscles tested for lower extremity studies include the vastus lateralis, tibialis anterior, peroneus longus, medial gastrocnemius and extensor digitorum brevis.  Additional muscles tested as needed.  Paraspinal muscles tested as needed.  The complete report includes the data sheets.    Indication: Numbness and tingling in the feet  History: 72-year-old white male with long-standing diabetes who has developed numbness and tingling in the feet.      Ht: 72 inches  Wt: Not reported  HbA1C:   Lab Results   Component Value Date    HGBA1C 7.80 (H) 01/05/2018     TSH:   Lab Results   Component Value Date    TSH 2.530 12/05/2016       Technical summary:  Nerve conduction studies were obtained in both legs.  The feet were cold prior to study approximately 30°C bilaterally.  Temperature correction was used as indicated.  Needle examination was obtained on selected muscles in both legs.    Results:  1.  Absent left sural sensory potential  2.  Absent superficial peroneal sensory potentials bilaterally.  3.  Slow left peroneal motor velocity at 36.6 m/s below the knee and 37.5 m/s in the short segment across the fibular head.  Normal distal latency and amplitudes.  Slow right peroneal motor velocity below the knee at 34.4 m/s and in the short segment across the fibular head at 35.3 m/s.  Normal distal latency and amplitudes.  4.  Normal tibial motor studies bilaterally.  5.  Needle examination of selected muscles in both legs showed fibrillations and positive sharp waves in the left tibialis anterior.  There was a mild increased number of large motor units with increased firing rates and reduced interference patterns.  All  other muscles tested in both legs showed normal insertional activities, motor units and recruitment patterns.  Lumbar paraspinals at L5 showed no abnormality on either side.    Impression:   Abnormal study showing moderate peripheral neuropathy.  Needle exam changes were consistent with the nerve conduction findings without additional findings to suggest a superimposed lumbosacral radiculopathy on either side.  Study results were discussed with the patient.    Isaiah Gutierrez M.D.              Dictated utilizing Dragon dictation.

## 2018-02-16 ENCOUNTER — TELEPHONE (OUTPATIENT)
Dept: FAMILY MEDICINE CLINIC | Facility: CLINIC | Age: 73
End: 2018-02-16

## 2018-02-16 NOTE — TELEPHONE ENCOUNTER
Patient notified.    ----- Message from Momo Macdonald MD sent at 2/14/2018  3:01 PM EST -----  Tell patient that his EMG does show a peripheral neuropathy, most probably secondary to diabetes.  Hopefully the gabapentin will start helping him.  He should let me know if this is helping him in the next couple of weeks.

## 2018-06-07 ENCOUNTER — TELEPHONE (OUTPATIENT)
Dept: FAMILY MEDICINE CLINIC | Facility: CLINIC | Age: 73
End: 2018-06-07

## 2018-06-07 RX ORDER — GABAPENTIN 300 MG/1
300 CAPSULE ORAL 3 TIMES DAILY
COMMUNITY
End: 2018-12-11 | Stop reason: SDUPTHER

## 2018-06-07 NOTE — TELEPHONE ENCOUNTER
PATIENT NOTIFIED, NEW DOSE CALLED IN.    ----- Message from Momo Macdonald MD sent at 6/7/2018 11:37 AM EDT -----  Tell patient that we need to increase his dose of gabapentin.  Send in prescription for 300 mg capsules, one 3 times a day.  Tell the patient that if this causes too much drowsiness during the day take only one in the morning and 2 at night instead of 1 capsule 3 times a day  ----- Message -----  From: Jana Lomeli MA  Sent: 6/6/2018   2:42 PM  To: Momo Macdonald MD    Patient is on 100 mg capsules, 3 capsules at bedtime    ----- Message -----  From: Kassi Dior  Sent: 6/5/2018  10:21 AM  To: Jana Lomeli MA    PATIENT WANTED TO TALK YOU OR DR MACDONALD ABOUT HIS GABAPENTIN DOSAGE, HE THINKS IT MAY BE TO LOW. PAIN KEEPS HIM AWAKE AT NIGHT. PLEASE CALL PT BACK [phone]    THANK YOU

## 2018-07-06 DIAGNOSIS — E11.9 TYPE 2 DIABETES MELLITUS WITHOUT COMPLICATION, WITHOUT LONG-TERM CURRENT USE OF INSULIN (HCC): ICD-10-CM

## 2018-07-06 DIAGNOSIS — Z00.00 ROUTINE HEALTH MAINTENANCE: Primary | ICD-10-CM

## 2018-07-10 DIAGNOSIS — Z00.00 ROUTINE HEALTH MAINTENANCE: Primary | ICD-10-CM

## 2018-07-12 ENCOUNTER — CLINICAL SUPPORT (OUTPATIENT)
Dept: FAMILY MEDICINE CLINIC | Facility: CLINIC | Age: 73
End: 2018-07-12

## 2018-07-12 DIAGNOSIS — Z00.00 ROUTINE HEALTH MAINTENANCE: Primary | ICD-10-CM

## 2018-07-12 DIAGNOSIS — I10 BENIGN ESSENTIAL HYPERTENSION: ICD-10-CM

## 2018-07-12 LAB
ALBUMIN SERPL-MCNC: 4.9 G/DL (ref 3.5–5.2)
ALBUMIN/GLOB SERPL: 2.6 G/DL
ALP SERPL-CCNC: 76 U/L (ref 39–117)
ALT SERPL-CCNC: 19 U/L (ref 1–41)
APPEARANCE UR: CLEAR
AST SERPL-CCNC: 12 U/L (ref 1–40)
BILIRUB SERPL-MCNC: 0.5 MG/DL (ref 0.1–1.2)
BILIRUB UR QL STRIP: NEGATIVE
BUN SERPL-MCNC: 21 MG/DL (ref 8–23)
BUN/CREAT SERPL: 25.6 (ref 7–25)
CALCIUM SERPL-MCNC: 9.3 MG/DL (ref 8.6–10.5)
CHLORIDE SERPL-SCNC: 95 MMOL/L (ref 98–107)
CHOLEST SERPL-MCNC: 162 MG/DL (ref 0–200)
CO2 SERPL-SCNC: 25 MMOL/L (ref 22–29)
COLOR UR: YELLOW
CREAT SERPL-MCNC: 0.82 MG/DL (ref 0.76–1.27)
GLOBULIN SER CALC-MCNC: 1.9 GM/DL
GLUCOSE SERPL-MCNC: 185 MG/DL (ref 65–99)
GLUCOSE UR QL: ABNORMAL
HBA1C MFR BLD: 9.26 % (ref 4.8–5.6)
HDLC SERPL-MCNC: 28 MG/DL (ref 40–60)
HGB UR QL STRIP: NEGATIVE
KETONES UR QL STRIP: NEGATIVE
LDLC SERPL CALC-MCNC: 56 MG/DL (ref 0–100)
LDLC/HDLC SERPL: 2.01 {RATIO}
LEUKOCYTE ESTERASE UR QL STRIP: NEGATIVE
NITRITE UR QL STRIP: NEGATIVE
PH UR STRIP: 6 [PH] (ref 5–8)
POTASSIUM SERPL-SCNC: 4.2 MMOL/L (ref 3.5–5.2)
PROT SERPL-MCNC: 6.8 G/DL (ref 6–8.5)
PROT UR QL STRIP: NEGATIVE
SODIUM SERPL-SCNC: 135 MMOL/L (ref 136–145)
SP GR UR: 1.01 (ref 1–1.03)
TRIGL SERPL-MCNC: 388 MG/DL (ref 0–150)
UROBILINOGEN UR STRIP-MCNC: ABNORMAL MG/DL
VLDLC SERPL CALC-MCNC: 77.6 MG/DL (ref 5–40)

## 2018-07-12 PROCEDURE — 71046 X-RAY EXAM CHEST 2 VIEWS: CPT

## 2018-07-12 PROCEDURE — 93000 ELECTROCARDIOGRAM COMPLETE: CPT

## 2018-07-12 PROCEDURE — 85025 COMPLETE CBC W/AUTO DIFF WBC: CPT

## 2018-07-24 RX ORDER — VALSARTAN 320 MG/1
320 TABLET ORAL DAILY
Qty: 90 TABLET | Refills: 3 | Status: SHIPPED | OUTPATIENT
Start: 2018-07-24 | End: 2018-07-26 | Stop reason: SDUPTHER

## 2018-07-24 RX ORDER — LOVASTATIN 20 MG/1
20 TABLET ORAL DAILY
Qty: 90 TABLET | Refills: 3 | Status: SHIPPED | OUTPATIENT
Start: 2018-07-24 | End: 2019-06-11 | Stop reason: SDUPTHER

## 2018-07-26 ENCOUNTER — TELEPHONE (OUTPATIENT)
Dept: FAMILY MEDICINE CLINIC | Facility: CLINIC | Age: 73
End: 2018-07-26

## 2018-07-26 RX ORDER — VALSARTAN 320 MG/1
320 TABLET ORAL DAILY
Qty: 90 TABLET | Refills: 3 | Status: SHIPPED | OUTPATIENT
Start: 2018-07-26 | End: 2018-08-20 | Stop reason: ALTCHOICE

## 2018-07-26 NOTE — TELEPHONE ENCOUNTER
I CALLED PATIENT AND LET HIM KNOW I WOULD SEND THIS PRESCRIPTION TO [facility] BECAUSE THEY HAVE VALSARTAN AVAILABLE THAT HASN'T BEEN RECALLED.    ----- Message from Kathleen Martinez MA sent at 7/26/2018  1:26 PM EDT -----  Contact: LINDA ELDRIDGE CALLED WANTING TO KNOW IF THEY COULD FILL SOMETHING ELSE FOR THE PT BECAUSE THE PT IS ON VALSARTAN AND THEY DON'T HAVE THE VERSION THAT IS NOT RECALLED / LINDA CAN BE REACHED [phone]

## 2018-08-07 ENCOUNTER — OFFICE VISIT (OUTPATIENT)
Dept: FAMILY MEDICINE CLINIC | Facility: CLINIC | Age: 73
End: 2018-08-07

## 2018-08-07 VITALS
WEIGHT: 228.8 LBS | OXYGEN SATURATION: 96 % | TEMPERATURE: 98.2 F | HEIGHT: 72 IN | BODY MASS INDEX: 30.99 KG/M2 | DIASTOLIC BLOOD PRESSURE: 66 MMHG | SYSTOLIC BLOOD PRESSURE: 118 MMHG | HEART RATE: 95 BPM

## 2018-08-07 DIAGNOSIS — Z00.00 ANNUAL PHYSICAL EXAM: Primary | ICD-10-CM

## 2018-08-07 DIAGNOSIS — E78.2 MIXED HYPERLIPIDEMIA: ICD-10-CM

## 2018-08-07 DIAGNOSIS — E11.40 TYPE 2 DIABETES MELLITUS WITH DIABETIC NEUROPATHY, WITHOUT LONG-TERM CURRENT USE OF INSULIN (HCC): ICD-10-CM

## 2018-08-07 DIAGNOSIS — E11.42 DIABETIC PERIPHERAL NEUROPATHY (HCC): ICD-10-CM

## 2018-08-07 DIAGNOSIS — I10 BENIGN ESSENTIAL HYPERTENSION: ICD-10-CM

## 2018-08-07 DIAGNOSIS — D72.828 GRANULOCYTOSIS: ICD-10-CM

## 2018-08-07 DIAGNOSIS — R53.83 FATIGUE, UNSPECIFIED TYPE: ICD-10-CM

## 2018-08-07 LAB — HEMOCCULT STL QL IA: NEGATIVE

## 2018-08-07 PROCEDURE — 82274 ASSAY TEST FOR BLOOD FECAL: CPT

## 2018-08-07 PROCEDURE — 99397 PER PM REEVAL EST PAT 65+ YR: CPT

## 2018-08-07 NOTE — PROGRESS NOTES
Jan Paiz is a 73 y.o. male. Patient is here today for   Chief Complaint   Patient presents with   • Annual Exam          Vitals:    08/07/18 1030   BP: 118/66   Pulse: 95   Temp: 98.2 °F (36.8 °C)   SpO2: 96%     The following portions of the patient's history were reviewed and updated as appropriate: allergies, current medications, past family history, past medical history, past social history, past surgical history and problem list.    Past Medical History:   Diagnosis Date   • Benign essential hypertension    • Cholelithiasis    • Colon polyp    • Diabetes mellitus (CMS/HCC)    • Fatigue    • H/O pulmonary emphysema    • Hyperlipidemia    • Leukocytosis       Allergies   Allergen Reactions   • Sulfa Antibiotics       Social History     Social History   • Marital status:      Spouse name: N/A   • Number of children: N/A   • Years of education: N/A     Occupational History   • Not on file.     Social History Main Topics   • Smoking status: Current Every Day Smoker     Packs/day: 2.00     Types: Cigarettes   • Smokeless tobacco: Never Used   • Alcohol use Yes   • Drug use: Unknown   • Sexual activity: Not on file     Other Topics Concern   • Not on file     Social History Narrative   • No narrative on file        Current Outpatient Prescriptions:   •  aspirin 81 MG chewable tablet, Chew 81 mg Daily., Disp: , Rfl:   •  Coenzyme Q10 (COQ-10) 400 MG capsule, Take by mouth., Disp: , Rfl:   •  Dapagliflozin Propanediol (FARXIGA) 10 MG tablet, Take 10 mg by mouth Daily., Disp: 90 tablet, Rfl: 3  •  gabapentin (NEURONTIN) 300 MG capsule, Take 300 mg by mouth 3 (Three) Times a Day., Disp: , Rfl:   •  lovastatin (MEVACOR) 20 MG tablet, Take 1 tablet by mouth Daily. In the evening before dinner, Disp: 90 tablet, Rfl: 3  •  MULTIPLE VITAMINS PO, Take by mouth., Disp: , Rfl:   •  naproxen sodium (ALEVE) 220 MG tablet, Take 220 mg by mouth 2 (Two) Times a Day As Needed., Disp: , Rfl:   •   SITagliptin-MetFORMIN HCl ER (JANUMET XR)  MG tablet, TAKE 2 TABLETS BY MOUTH DAILY, Disp: 180 tablet, Rfl: 3  •  valsartan (DIOVAN) 320 MG tablet, Take 1 tablet by mouth Daily., Disp: 90 tablet, Rfl: 3     Objective     History of Present Illness   The patient is here today for annual physical exam    Review of Systems   Constitutional: Negative for chills and fever.        Moderate fatigue in the afternoons.  Patient states that he does not sleep well during the night because of arthritic aches and pains waking him up during the night   HENT: Negative for congestion, ear discharge, ear pain, hearing loss, postnasal drip and sore throat.    Eyes: Negative for photophobia and visual disturbance.   Respiratory: Negative for cough, shortness of breath and wheezing.    Cardiovascular: Negative for chest pain, palpitations and leg swelling.   Gastrointestinal: Negative for abdominal distention, abdominal pain, blood in stool, constipation, diarrhea and nausea.   Genitourinary: Negative for difficulty urinating, dysuria, frequency and hematuria.   Musculoskeletal:        The patient does have mild to moderate osteoarthritic aches and pains in various joints.   Neurological: Negative for headaches.        The patient does have evidence of a peripheral neuropathy involving his feet,  As confirmed by EMG done earlier this year   Hematological: Negative for adenopathy. Does not bruise/bleed easily.   Psychiatric/Behavioral: Negative.        Physical Exam   Constitutional: He is oriented to person, place, and time. He appears well-developed and well-nourished. No distress.   Moderately overweight   HENT:   Head: Normocephalic.   Right Ear: External ear normal.   Left Ear: External ear normal.   Nose: Nose normal.   Mouth/Throat: Oropharynx is clear and moist.   Eyes: Pupils are equal, round, and reactive to light. Conjunctivae are normal. No scleral icterus.   Neck: No thyromegaly present.   Carotid pulses normal    Cardiovascular: Normal rate, regular rhythm, normal heart sounds and intact distal pulses.  Exam reveals no friction rub.    No murmur heard.  Pulmonary/Chest: Effort normal and breath sounds normal. No respiratory distress. He has no wheezes. He has no rales.   Abdominal: Soft. Bowel sounds are normal. He exhibits no distension and no mass. There is no tenderness. There is no guarding.   Genitourinary: Rectum normal, prostate normal and penis normal. Rectal exam shows guaiac negative stool.   Musculoskeletal: He exhibits no edema.   Osteoarthritic changes in multiple joints   Lymphadenopathy:     He has no cervical adenopathy.   Neurological: He is alert and oriented to person, place, and time.   The patient's sensation in his feet seems only slightly impaired    Skin: Skin is warm and dry. He is not diaphoretic.   Psychiatric: He has a normal mood and affect.   Nursing note and vitals reviewed.      ASSESSMENT  #1 annual physical exam                  #2 type 2 diabetes mellitus                      #3 mixed hyperlipidemia                #4  diabetic peripheral neuropathy                  #5 essential hypertension           #6 mild granulocytosis               #7 fatigue    DISCUSSION/SUMMARY   Vital signs are normal.  Chest x-ray shows no active disease and no change from multiple previous chest x-rays, most recently June 2017.  EKG shows left axis deviation, right bundle branch block, T-wave changes in the inferior leads.  EKG has been the same now for over 10 years.  The patient's CBC was normal except for mild elevation of the white count at about 13,000 with a slight right shift.  CMP shows a fasting blood sugar of 185, sodium 135, chloride 95 but was otherwise normal.  Unfortunately the patient's hemoglobin A1c has gone up significantly from 7 months ago.  His hemoglobin A1c is 9.26%, up from 7.80%.  The patient admits that he has not been on a good diet.  The patient is currently on Janumet X are 50/1002  daily and Farxiga 10 mg daily.  Total cholesterol is 162, triglycerides 388, HDL cholesterol 28 and  calculated LDL is 56.  The patient continues to have symptoms of diabetic peripheral neuropathy in his feet.  He has some numbness and slight tingling as well as some pain off and on.  He is currently taking gabapentin 900 mg total per day.  The patient's main complaint is fatigue in the afternoon and his peripheral neuropathy.  Because his hemoglobin A1c is quite high I explained to him that his blood sugars overall have been running high and this could've increased his feeling of fatigue.  The patient does continue to smoke 2 packs cigarettes per day.  The patient's last colonoscopy was 4 years ago and it was normal.  The colonoscopy prior to this did show colon polyps.  The patient is to have another colonoscopy in 2019.  The patient last saw his ophthalmologist about a year and a half ago and is going to make an appointment for follow-up on his eye exam.    PLAN  Recheck in 3 months with fasting CMP, lipid panel, HbA1c, CBC, free T4 and TSH  No Follow-up on file.

## 2018-08-20 ENCOUNTER — TELEPHONE (OUTPATIENT)
Dept: FAMILY MEDICINE CLINIC | Facility: CLINIC | Age: 73
End: 2018-08-20

## 2018-08-20 RX ORDER — IRBESARTAN 300 MG/1
300 TABLET ORAL NIGHTLY
Qty: 90 TABLET | Refills: 3 | Status: SHIPPED | OUTPATIENT
Start: 2018-08-20 | End: 2019-07-15 | Stop reason: SDUPTHER

## 2018-08-20 NOTE — TELEPHONE ENCOUNTER
Done     ----- Message from Momo Macdonald MD sent at 8/20/2018 12:42 PM EDT -----  Assuming his insurance covers it change him to irbesartan 300 mg tablets one daily  ----- Message -----  From: Jana Lomeli MA  Sent: 8/20/2018  11:08 AM  To: Momo Macdonald MD        ----- Message -----  From: Kassi Dior  Sent: 8/20/2018  10:57 AM  To: Jana Lomeli MA    TAKES VALSARTAN AND WANTED TO HAVE SOMETHING ELSE CALLED IN.      [facility]    PLEASE CALL PT BACK WITH ANY QUESTIONS  [phone]

## 2018-11-05 DIAGNOSIS — E11.9 TYPE 2 DIABETES MELLITUS WITHOUT COMPLICATION, UNSPECIFIED WHETHER LONG TERM INSULIN USE (HCC): ICD-10-CM

## 2018-11-05 DIAGNOSIS — E78.5 HYPERLIPIDEMIA, UNSPECIFIED HYPERLIPIDEMIA TYPE: Primary | ICD-10-CM

## 2018-11-05 DIAGNOSIS — I10 ESSENTIAL HYPERTENSION: ICD-10-CM

## 2018-11-08 LAB
ALBUMIN SERPL-MCNC: 4.4 G/DL (ref 3.5–5.2)
ALBUMIN/GLOB SERPL: 1.8 G/DL
ALP SERPL-CCNC: 76 U/L (ref 39–117)
ALT SERPL-CCNC: 19 U/L (ref 1–41)
AST SERPL-CCNC: 11 U/L (ref 1–40)
BASOPHILS # BLD AUTO: 0.04 10*3/MM3 (ref 0–0.2)
BASOPHILS NFR BLD AUTO: 0.4 % (ref 0–1.5)
BILIRUB SERPL-MCNC: 0.7 MG/DL (ref 0.1–1.2)
BUN SERPL-MCNC: 19 MG/DL (ref 8–23)
BUN/CREAT SERPL: 23.5 (ref 7–25)
CALCIUM SERPL-MCNC: 9.6 MG/DL (ref 8.6–10.5)
CHLORIDE SERPL-SCNC: 101 MMOL/L (ref 98–107)
CHOLEST SERPL-MCNC: 160 MG/DL (ref 0–200)
CO2 SERPL-SCNC: 26.3 MMOL/L (ref 22–29)
CREAT SERPL-MCNC: 0.81 MG/DL (ref 0.76–1.27)
EOSINOPHIL # BLD AUTO: 0.5 10*3/MM3 (ref 0–0.7)
EOSINOPHIL NFR BLD AUTO: 4.4 % (ref 0.3–6.2)
ERYTHROCYTE [DISTWIDTH] IN BLOOD BY AUTOMATED COUNT: 13.4 % (ref 11.5–14.5)
GLOBULIN SER CALC-MCNC: 2.4 GM/DL
GLUCOSE SERPL-MCNC: 196 MG/DL (ref 65–99)
HBA1C MFR BLD: 8.1 % (ref 4.8–5.6)
HCT VFR BLD AUTO: 53.7 % (ref 40.4–52.2)
HDLC SERPL-MCNC: 28 MG/DL (ref 40–60)
HGB BLD-MCNC: 18.7 G/DL (ref 13.7–17.6)
IMM GRANULOCYTES # BLD: 0.06 10*3/MM3 (ref 0–0.03)
IMM GRANULOCYTES NFR BLD: 0.5 % (ref 0–0.5)
LDLC SERPL CALC-MCNC: ABNORMAL MG/DL
LDLC/HDLC SERPL: ABNORMAL {RATIO}
LYMPHOCYTES # BLD AUTO: 2.36 10*3/MM3 (ref 0.9–4.8)
LYMPHOCYTES NFR BLD AUTO: 20.8 % (ref 19.6–45.3)
MCH RBC QN AUTO: 34.1 PG (ref 27–32.7)
MCHC RBC AUTO-ENTMCNC: 34.8 G/DL (ref 32.6–36.4)
MCV RBC AUTO: 98 FL (ref 79.8–96.2)
MONOCYTES # BLD AUTO: 0.84 10*3/MM3 (ref 0.2–1.2)
MONOCYTES NFR BLD AUTO: 7.4 % (ref 5–12)
NEUTROPHILS # BLD AUTO: 7.63 10*3/MM3 (ref 1.9–8.1)
NEUTROPHILS NFR BLD AUTO: 67 % (ref 42.7–76)
PLATELET # BLD AUTO: 140 10*3/MM3 (ref 140–500)
POTASSIUM SERPL-SCNC: 5 MMOL/L (ref 3.5–5.2)
PROT SERPL-MCNC: 6.8 G/DL (ref 6–8.5)
RBC # BLD AUTO: 5.48 10*6/MM3 (ref 4.6–6)
SODIUM SERPL-SCNC: 141 MMOL/L (ref 136–145)
T4 FREE SERPL-MCNC: 1.48 NG/DL (ref 0.93–1.7)
TRIGL SERPL-MCNC: 413 MG/DL (ref 0–150)
TSH SERPL DL<=0.005 MIU/L-ACNC: 1.64 MIU/ML (ref 0.27–4.2)
VLDLC SERPL CALC-MCNC: ABNORMAL MG/DL
WBC # BLD AUTO: 11.37 10*3/MM3 (ref 4.5–10.7)

## 2018-11-14 ENCOUNTER — OFFICE VISIT (OUTPATIENT)
Dept: FAMILY MEDICINE CLINIC | Facility: CLINIC | Age: 73
End: 2018-11-14

## 2018-11-14 VITALS
HEIGHT: 72 IN | WEIGHT: 232 LBS | HEART RATE: 91 BPM | SYSTOLIC BLOOD PRESSURE: 132 MMHG | BODY MASS INDEX: 31.42 KG/M2 | TEMPERATURE: 97.6 F | OXYGEN SATURATION: 97 % | DIASTOLIC BLOOD PRESSURE: 76 MMHG | RESPIRATION RATE: 18 BRPM

## 2018-11-14 DIAGNOSIS — D75.1 POLYCYTHEMIA SECONDARY TO SMOKING: ICD-10-CM

## 2018-11-14 DIAGNOSIS — I10 BENIGN ESSENTIAL HYPERTENSION: ICD-10-CM

## 2018-11-14 DIAGNOSIS — E78.2 MIXED HYPERLIPIDEMIA: ICD-10-CM

## 2018-11-14 DIAGNOSIS — E11.40 TYPE 2 DIABETES MELLITUS WITH DIABETIC NEUROPATHY, WITHOUT LONG-TERM CURRENT USE OF INSULIN (HCC): Primary | ICD-10-CM

## 2018-11-14 DIAGNOSIS — E11.42 DIABETIC PERIPHERAL NEUROPATHY (HCC): ICD-10-CM

## 2018-11-14 DIAGNOSIS — R53.83 FATIGUE, UNSPECIFIED TYPE: ICD-10-CM

## 2018-11-14 PROCEDURE — 99214 OFFICE O/P EST MOD 30 MIN: CPT

## 2018-11-14 NOTE — PROGRESS NOTES
Jan Paiz is a 73 y.o. male. Patient is here today for   Chief Complaint   Patient presents with   • Diabetes   • Hyperlipidemia   • Hypertension          Vitals:    11/14/18 0947   BP: 132/76   Pulse: 91   Resp: 18   Temp: 97.6 °F (36.4 °C)   SpO2: 97%     The following portions of the patient's history were reviewed and updated as appropriate: allergies, current medications, past family history, past medical history, past social history, past surgical history and problem list.    Past Medical History:   Diagnosis Date   • Benign essential hypertension    • Cholelithiasis    • Colon polyp    • Diabetes mellitus (CMS/HCC)    • Fatigue    • H/O pulmonary emphysema    • Hyperlipidemia    • Leukocytosis       Allergies   Allergen Reactions   • Sulfa Antibiotics       Social History     Socioeconomic History   • Marital status:      Spouse name: Not on file   • Number of children: Not on file   • Years of education: Not on file   • Highest education level: Not on file   Social Needs   • Financial resource strain: Not on file   • Food insecurity - worry: Not on file   • Food insecurity - inability: Not on file   • Transportation needs - medical: Not on file   • Transportation needs - non-medical: Not on file   Occupational History   • Not on file   Tobacco Use   • Smoking status: Current Every Day Smoker     Packs/day: 2.00     Types: Cigarettes   • Smokeless tobacco: Never Used   Substance and Sexual Activity   • Alcohol use: Yes   • Drug use: Not on file   • Sexual activity: Not on file   Other Topics Concern   • Not on file   Social History Narrative   • Not on file        Current Outpatient Medications:   •  aspirin 81 MG chewable tablet, Chew 81 mg Daily., Disp: , Rfl:   •  Coenzyme Q10 (COQ-10) 400 MG capsule, Take by mouth., Disp: , Rfl:   •  Dapagliflozin Propanediol (FARXIGA) 10 MG tablet, Take 10 mg by mouth Daily., Disp: 90 tablet, Rfl: 3  •  gabapentin (NEURONTIN) 300 MG capsule,  Take 300 mg by mouth 3 (Three) Times a Day., Disp: , Rfl:   •  irbesartan (AVAPRO) 300 MG tablet, Take 1 tablet by mouth Every Night., Disp: 90 tablet, Rfl: 3  •  lovastatin (MEVACOR) 20 MG tablet, Take 1 tablet by mouth Daily. In the evening before dinner, Disp: 90 tablet, Rfl: 3  •  MULTIPLE VITAMINS PO, Take by mouth., Disp: , Rfl:   •  naproxen sodium (ALEVE) 220 MG tablet, Take 220 mg by mouth 2 (Two) Times a Day As Needed., Disp: , Rfl:   •  SITagliptin-MetFORMIN HCl ER (JANUMET XR)  MG tablet, TAKE 2 TABLETS BY MOUTH DAILY, Disp: 180 tablet, Rfl: 3     Objective     History of Present Illness   The patient is here today for follow-up on type 2 diabetes mellitus, essential hypertension, hyperlipidemia, and diabetic peripheral neuropathy    Review of Systems   Constitutional:        Mild chronic fatigue   HENT: Negative.    Respiratory: Negative for cough, shortness of breath and wheezing.    Cardiovascular: Negative for chest pain, palpitations and leg swelling.   Gastrointestinal: Negative for abdominal pain, blood in stool, constipation and diarrhea.   Genitourinary: Negative.    Musculoskeletal:        Mild aches and pains only   Neurological:        The patient does have symptoms of diabetic peripheral neuropathy with some numbness and tingling as well as aching in his lower legs and feet which is especially worse at night.   Hematological: Negative.    Psychiatric/Behavioral: Negative.        Physical Exam   Constitutional: He is oriented to person, place, and time. He appears well-developed and well-nourished.   Mildly overweight   Neck:   Carotid pulses normal   Cardiovascular: Normal rate, regular rhythm and normal heart sounds.   Pulmonary/Chest: Effort normal and breath sounds normal. No respiratory distress. He has no wheezes. He has no rales.   Abdominal: Soft. Bowel sounds are normal.   Musculoskeletal: Normal range of motion. He exhibits no edema.   Neurological: He is alert and oriented  to person, place, and time.   Sensation in lower legs is intact   Skin: Skin is warm and dry.   Psychiatric: He has a normal mood and affect.   Nursing note and vitals reviewed.      ASSESSMENT   #1 type 2 diabetes mellitus                 #2 essential hypertension                 #3 mixed hyperlipidemia                    #4 peripheral diabetic neuropathy                     #5 polycythemia probably secondary to long-term cigarette smoking      DISCUSSION/SUMMARY     The patient's vital signs are normal.  CBC shows a slightly elevated white blood cell count with a normal differential and the patient's hemoglobin is elevated at 18.7.  Free T4 and TSH levels were normal.  Fasting blood sugar was 196 and the patient's CMP was otherwise normal.  The patient's hemoglobin A1c is still elevated at 8.10% but it is down from 9.26% 3-4 months ago.  Total cholesterol is 160, triglycerides 113, HDL cholesterol 28 and calculated LDL was not done.  The patient will continue his present medications and improved diet and exercise regimen.  The patient has had problems with diabetic peripheral neuropathy but has started a series of acupuncturist which have definitely helped him.  The patient has had 4 sessions of acupuncture and is currently doing much better with respect to his numbness and discomfort in his lower legs and feet.  The patient is still taking gabapentin a total of 900 mg daily and we will start trying to cut this back if his symptoms Improving with Respect to His Diabetic Peripheral Neuropathy.  I did discuss with the patient the possibility of him starting Vascepa  For his high triglycerides but the patient is very reluctant to start any new medications at this time.    PLAN  Recheck in 3-4 months with fasting CMP, lipid panel and hemoglobin A1c as well as a CBC.  No Follow-up on file.

## 2018-12-11 RX ORDER — GABAPENTIN 300 MG/1
300 CAPSULE ORAL 3 TIMES DAILY
Qty: 90 CAPSULE | Refills: 2 | Status: SHIPPED | OUTPATIENT
Start: 2018-12-11 | End: 2019-06-11 | Stop reason: SDUPTHER

## 2018-12-12 ENCOUNTER — TELEPHONE (OUTPATIENT)
Dept: FAMILY MEDICINE CLINIC | Facility: CLINIC | Age: 73
End: 2018-12-12

## 2018-12-12 NOTE — TELEPHONE ENCOUNTER
Left voicemail letting patient know this is ready for     ----- Message from Katy Weiss sent at 12/11/2018 11:56 AM EST -----  Contact: [phone]  NEEDS REFILL    GABAPENTIN 300 MG 1 TID #90      JAZMYN GERARD     CALL WHEN READY- I TOLD HIM IN ERROR TO CHECK WITH PHARMACY TOMORROW FORGOT THIS WAS WRITTEN

## 2019-03-01 DIAGNOSIS — E78.5 HYPERLIPIDEMIA, UNSPECIFIED HYPERLIPIDEMIA TYPE: ICD-10-CM

## 2019-03-01 DIAGNOSIS — E11.9 TYPE 2 DIABETES MELLITUS WITHOUT COMPLICATION, UNSPECIFIED WHETHER LONG TERM INSULIN USE (HCC): Primary | ICD-10-CM

## 2019-03-06 LAB
ALBUMIN SERPL-MCNC: 4.8 G/DL (ref 3.5–5.2)
ALBUMIN/CREAT UR: 208.1 MG/G CREAT (ref 0–30)
ALBUMIN/GLOB SERPL: 2.4 G/DL
ALP SERPL-CCNC: 71 U/L (ref 39–117)
ALT SERPL-CCNC: 23 U/L (ref 1–41)
AST SERPL-CCNC: 10 U/L (ref 1–40)
BASOPHILS # BLD AUTO: 0.06 10*3/MM3 (ref 0–0.2)
BASOPHILS NFR BLD AUTO: 0.6 % (ref 0–1.5)
BILIRUB SERPL-MCNC: 0.7 MG/DL (ref 0.1–1.2)
BUN SERPL-MCNC: 19 MG/DL (ref 8–23)
BUN/CREAT SERPL: 22.6 (ref 7–25)
CALCIUM SERPL-MCNC: 10 MG/DL (ref 8.6–10.5)
CHLORIDE SERPL-SCNC: 96 MMOL/L (ref 98–107)
CHOLEST SERPL-MCNC: 161 MG/DL (ref 0–200)
CO2 SERPL-SCNC: 25.6 MMOL/L (ref 22–29)
CREAT SERPL-MCNC: 0.84 MG/DL (ref 0.76–1.27)
CREAT UR-MCNC: 18.5 MG/DL
EOSINOPHIL # BLD AUTO: 0.41 10*3/MM3 (ref 0–0.4)
EOSINOPHIL NFR BLD AUTO: 4.1 % (ref 0.3–6.2)
ERYTHROCYTE [DISTWIDTH] IN BLOOD BY AUTOMATED COUNT: 13 % (ref 12.3–15.4)
GLOBULIN SER CALC-MCNC: 2 GM/DL
GLUCOSE SERPL-MCNC: 249 MG/DL (ref 65–99)
HBA1C MFR BLD: 8.54 % (ref 4.8–5.6)
HCT VFR BLD AUTO: 57.3 % (ref 37.5–51)
HDLC SERPL-MCNC: 26 MG/DL (ref 40–60)
HGB BLD-MCNC: 18.9 G/DL (ref 13–17.7)
IMM GRANULOCYTES # BLD AUTO: 0.07 10*3/MM3 (ref 0–0.05)
IMM GRANULOCYTES NFR BLD AUTO: 0.7 % (ref 0–0.5)
LDLC SERPL CALC-MCNC: ABNORMAL MG/DL
LDLC/HDLC SERPL: ABNORMAL {RATIO}
LYMPHOCYTES # BLD AUTO: 1.58 10*3/MM3 (ref 0.7–3.1)
LYMPHOCYTES NFR BLD AUTO: 15.9 % (ref 19.6–45.3)
MCH RBC QN AUTO: 32.7 PG (ref 26.6–33)
MCHC RBC AUTO-ENTMCNC: 33 G/DL (ref 31.5–35.7)
MCV RBC AUTO: 99.1 FL (ref 79–97)
MICROALBUMIN UR-MCNC: 38.5 UG/ML
MONOCYTES # BLD AUTO: 0.98 10*3/MM3 (ref 0.1–0.9)
MONOCYTES NFR BLD AUTO: 9.9 % (ref 5–12)
NEUTROPHILS # BLD AUTO: 6.84 10*3/MM3 (ref 1.4–7)
NEUTROPHILS NFR BLD AUTO: 68.8 % (ref 42.7–76)
NRBC BLD AUTO-RTO: 0.1 /100 WBC (ref 0–0)
PLATELET # BLD AUTO: 168 10*3/MM3 (ref 140–450)
POTASSIUM SERPL-SCNC: 5.2 MMOL/L (ref 3.5–5.2)
PROT SERPL-MCNC: 6.8 G/DL (ref 6–8.5)
RBC # BLD AUTO: 5.78 10*6/MM3 (ref 4.14–5.8)
SODIUM SERPL-SCNC: 138 MMOL/L (ref 136–145)
TRIGL SERPL-MCNC: 480 MG/DL (ref 0–150)
VLDLC SERPL CALC-MCNC: ABNORMAL MG/DL
WBC # BLD AUTO: 9.94 10*3/MM3 (ref 3.4–10.8)

## 2019-03-12 ENCOUNTER — OFFICE VISIT (OUTPATIENT)
Dept: FAMILY MEDICINE CLINIC | Facility: CLINIC | Age: 74
End: 2019-03-12

## 2019-03-12 VITALS
HEART RATE: 86 BPM | BODY MASS INDEX: 31.42 KG/M2 | WEIGHT: 232 LBS | HEIGHT: 72 IN | SYSTOLIC BLOOD PRESSURE: 128 MMHG | RESPIRATION RATE: 18 BRPM | OXYGEN SATURATION: 98 % | DIASTOLIC BLOOD PRESSURE: 72 MMHG

## 2019-03-12 DIAGNOSIS — D58.2 ELEVATED HEMOGLOBIN (HCC): ICD-10-CM

## 2019-03-12 DIAGNOSIS — I10 BENIGN ESSENTIAL HYPERTENSION: ICD-10-CM

## 2019-03-12 DIAGNOSIS — R53.83 FATIGUE, UNSPECIFIED TYPE: ICD-10-CM

## 2019-03-12 DIAGNOSIS — E78.2 MIXED HYPERLIPIDEMIA: ICD-10-CM

## 2019-03-12 DIAGNOSIS — E11.42 DIABETIC PERIPHERAL NEUROPATHY (HCC): ICD-10-CM

## 2019-03-12 DIAGNOSIS — E11.40 TYPE 2 DIABETES MELLITUS WITH DIABETIC NEUROPATHY, WITHOUT LONG-TERM CURRENT USE OF INSULIN (HCC): Primary | ICD-10-CM

## 2019-03-12 PROCEDURE — 99214 OFFICE O/P EST MOD 30 MIN: CPT

## 2019-03-12 RX ORDER — GLIMEPIRIDE 2 MG/1
2 TABLET ORAL
Qty: 90 TABLET | Refills: 3 | Status: SHIPPED | OUTPATIENT
Start: 2019-03-12 | End: 2019-04-13

## 2019-03-12 NOTE — PROGRESS NOTES
Jan Paiz is a 73 y.o. male. Patient is here today for   Chief Complaint   Patient presents with   • Diabetes   • Hyperlipidemia          Vitals:    03/12/19 0923   BP: 128/72   Pulse: 86   Resp: 18   SpO2: 98%     The following portions of the patient's history were reviewed and updated as appropriate: allergies, current medications, past family history, past medical history, past social history, past surgical history and problem list.    Past Medical History:   Diagnosis Date   • Benign essential hypertension    • Cholelithiasis    • Colon polyp    • Diabetes mellitus (CMS/HCC)    • Fatigue    • H/O pulmonary emphysema    • Hyperlipidemia    • Leukocytosis       Allergies   Allergen Reactions   • Sulfa Antibiotics       Social History     Socioeconomic History   • Marital status:      Spouse name: Not on file   • Number of children: Not on file   • Years of education: Not on file   • Highest education level: Not on file   Social Needs   • Financial resource strain: Not on file   • Food insecurity - worry: Not on file   • Food insecurity - inability: Not on file   • Transportation needs - medical: Not on file   • Transportation needs - non-medical: Not on file   Occupational History   • Not on file   Tobacco Use   • Smoking status: Current Every Day Smoker     Packs/day: 2.00     Types: Cigarettes   • Smokeless tobacco: Never Used   Substance and Sexual Activity   • Alcohol use: Yes   • Drug use: Not on file   • Sexual activity: Not on file   Other Topics Concern   • Not on file   Social History Narrative   • Not on file        Current Outpatient Medications:   •  aspirin 81 MG chewable tablet, Chew 81 mg Daily., Disp: , Rfl:   •  Coenzyme Q10 (COQ-10) 400 MG capsule, Take by mouth., Disp: , Rfl:   •  Dapagliflozin Propanediol (FARXIGA) 10 MG tablet, Take 10 mg by mouth Daily., Disp: 90 tablet, Rfl: 3  •  gabapentin (NEURONTIN) 300 MG capsule, Take 1 capsule by mouth 3 (Three) Times a  Day., Disp: 90 capsule, Rfl: 2  •  irbesartan (AVAPRO) 300 MG tablet, Take 1 tablet by mouth Every Night., Disp: 90 tablet, Rfl: 3  •  lovastatin (MEVACOR) 20 MG tablet, Take 1 tablet by mouth Daily. In the evening before dinner, Disp: 90 tablet, Rfl: 3  •  MULTIPLE VITAMINS PO, Take by mouth., Disp: , Rfl:   •  naproxen sodium (ALEVE) 220 MG tablet, Take 220 mg by mouth 2 (Two) Times a Day As Needed., Disp: , Rfl:   •  SITagliptin-MetFORMIN HCl ER (JANUMET XR)  MG tablet, TAKE 2 TABLETS BY MOUTH DAILY, Disp: 180 tablet, Rfl: 3  •  glimepiride (AMARYL) 2 MG tablet, Take 1 tablet by mouth Every Morning Before Breakfast., Disp: 90 tablet, Rfl: 3     Objective     History of Present Illness   The patient is here today for follow-up on type 2 diabetes mellitus, essential hypertension, mixed hyperlipidemia, diabetic peripheral neuropathy, polycythemia and excessive daytime fatigue    Review of Systems   Constitutional: Negative for appetite change, chills and fever.        Chronic fatigue.  Patient states that he has not been exercising quite as much as he was doing last visit.   HENT: Negative.    Respiratory: Negative for cough, shortness of breath and wheezing.    Cardiovascular: Negative for chest pain, palpitations and leg swelling.   Gastrointestinal: Negative for abdominal pain, blood in stool, constipation, diarrhea and nausea.   Genitourinary: Negative.    Musculoskeletal:        The patient has mild to moderate osteoarthritic pain in multiple joints   Neurological: Negative for dizziness, numbness and headaches.        Patient does have symptoms of diabetic peripheral neuropathy in his lower legs and feet.  This seems to be worse in the evenings.  It also is worse if he has been up on his feet for a while.   Hematological: Negative.    Psychiatric/Behavioral: Negative.        Physical Exam   Constitutional: He is oriented to person, place, and time. He appears well-developed and well-nourished.    Moderately overweight   Neck: No thyromegaly present.   Carotid pulses normal   Cardiovascular: Normal rate, regular rhythm and normal heart sounds.   Pulmonary/Chest: Effort normal and breath sounds normal. He has no wheezes. He has no rales.   Abdominal: Soft. Bowel sounds are normal.   Musculoskeletal: He exhibits no edema.   Osteoarthritic changes in multiple joints   Neurological: He is alert and oriented to person, place, and time. No cranial nerve deficit. Coordination normal.   Skin: Skin is warm and dry.   Psychiatric: He has a normal mood and affect.   Nursing note and vitals reviewed.      ASSESSMENT  #1 type 2 diabetes mellitus                  #2 essential hypertension                  #3 mixed hyperlipidemia                  #4 diabetic peripheral neuropathy               #5 elevated hemoglobin                   #6 chronic fatigue                #7 diabetic peripheral neuropathy    DISCUSSION/SUMMARY   The patient's vital signs are normal.  CMP shows an elevated fasting blood sugar of 249 but was otherwise essentially normal.  Hemoglobin A1c is still elevated at 8.54%.  CBC shows an elevated hemoglobin of 18.9.  I have assumed his erythrocytosis is from his history of cigarette smoking but his hemoglobin is quite elevated  and I am going to send him to hematology for evaluation..  Total cholesterol is 161, triglycerides 480, HDL cholesterol is 26 and calculated LDL could not be done because of the markedly high triglyceride level.  Patient does have microalbuminuria.  The patient states that generally he does not eat obvious sweets but does eat a moderate amount of starchy foods.  He will try to cut back on these somewhat.  The patient will try to increase his exercise level.  I am going to add glimepiride 2 mg every morning to his diabetic medication regimen.  The patient understands that if I cannot get his diabetes under control with oral meds he may need to go to either insulin or another  injectable.  The patient does complain about excessive daytime fatigue and I have suggested to him on several occasions that he should have a sleep study but he really does not want to do this at this time.  Patient continues to have his diabetic peripheral neuropathy and most of his foot and lower leg pain is at night so he has been taking his gabapentin 300 mg about 5 or 6 PM and 600 mg at bedtime.  He is also seeing an acupuncturist about once a month for this.  He states that he has had some relief but it is still a problem for him.    PLAN  Recheck in 3 months with fasting CMP, lipid panel, hemoglobin A1c and CBC, free T4, TSH and total testosterone level  No Follow-up on file.

## 2019-03-29 ENCOUNTER — LAB (OUTPATIENT)
Dept: OTHER | Facility: HOSPITAL | Age: 74
End: 2019-03-29

## 2019-03-29 ENCOUNTER — CONSULT (OUTPATIENT)
Dept: ONCOLOGY | Facility: CLINIC | Age: 74
End: 2019-03-29

## 2019-03-29 ENCOUNTER — TELEPHONE (OUTPATIENT)
Dept: FAMILY MEDICINE CLINIC | Facility: CLINIC | Age: 74
End: 2019-03-29

## 2019-03-29 VITALS
BODY MASS INDEX: 31.51 KG/M2 | HEART RATE: 100 BPM | SYSTOLIC BLOOD PRESSURE: 133 MMHG | TEMPERATURE: 98 F | WEIGHT: 232.6 LBS | RESPIRATION RATE: 18 BRPM | DIASTOLIC BLOOD PRESSURE: 74 MMHG | HEIGHT: 72 IN | OXYGEN SATURATION: 97 %

## 2019-03-29 DIAGNOSIS — D58.2 ELEVATED HEMOGLOBIN (HCC): Primary | ICD-10-CM

## 2019-03-29 DIAGNOSIS — R79.9 ABNORMAL FINDING OF BLOOD CHEMISTRY: ICD-10-CM

## 2019-03-29 DIAGNOSIS — D75.1 ERYTHROCYTOSIS: ICD-10-CM

## 2019-03-29 DIAGNOSIS — D72.828 GRANULOCYTOSIS: ICD-10-CM

## 2019-03-29 LAB
ALBUMIN SERPL-MCNC: 4.5 G/DL (ref 3.5–5.2)
ALBUMIN/GLOB SERPL: 1.8 G/DL
ALP SERPL-CCNC: 73 U/L (ref 39–117)
ALT SERPL W P-5'-P-CCNC: 20 U/L (ref 1–41)
ANION GAP SERPL CALCULATED.3IONS-SCNC: 12.9 MMOL/L
AST SERPL-CCNC: 15 U/L (ref 1–40)
BASOPHILS # BLD AUTO: 0.07 10*3/MM3 (ref 0–0.2)
BASOPHILS NFR BLD AUTO: 0.6 % (ref 0–1.5)
BILIRUB SERPL-MCNC: 0.8 MG/DL (ref 0.1–1.2)
BUN BLD-MCNC: 19 MG/DL (ref 8–23)
BUN/CREAT SERPL: 27.5 (ref 7–25)
CALCIUM SPEC-SCNC: 9.5 MG/DL (ref 8.6–10.5)
CHLORIDE SERPL-SCNC: 101 MMOL/L (ref 98–107)
CO2 SERPL-SCNC: 26.1 MMOL/L (ref 22–29)
CREAT BLD-MCNC: 0.69 MG/DL (ref 0.76–1.27)
DEPRECATED RDW RBC AUTO: 42.8 FL (ref 37–54)
EOSINOPHIL # BLD AUTO: 0.36 10*3/MM3 (ref 0–0.4)
EOSINOPHIL NFR BLD AUTO: 2.9 % (ref 0.3–6.2)
ERYTHROCYTE [DISTWIDTH] IN BLOOD BY AUTOMATED COUNT: 12.5 % (ref 12.3–15.4)
FERRITIN SERPL-MCNC: 140.2 NG/ML (ref 30–400)
GFR SERPL CREATININE-BSD FRML MDRD: 113 ML/MIN/1.73
GLOBULIN UR ELPH-MCNC: 2.5 GM/DL
GLUCOSE BLD-MCNC: 197 MG/DL (ref 65–99)
HCT VFR BLD AUTO: 49.9 % (ref 37.5–51)
HGB BLD-MCNC: 17.7 G/DL (ref 13–17.7)
IMM GRANULOCYTES # BLD AUTO: 0.07 10*3/MM3 (ref 0–0.05)
IMM GRANULOCYTES NFR BLD AUTO: 0.6 % (ref 0–0.5)
IRON 24H UR-MRATE: 88 MCG/DL (ref 59–158)
IRON SATN MFR SERPL: 23 % (ref 20–50)
LDH SERPL-CCNC: 211 U/L (ref 135–225)
LYMPHOCYTES # BLD AUTO: 1.61 10*3/MM3 (ref 0.7–3.1)
LYMPHOCYTES NFR BLD AUTO: 13 % (ref 19.6–45.3)
MCH RBC QN AUTO: 33.2 PG (ref 26.6–33)
MCHC RBC AUTO-ENTMCNC: 35.5 G/DL (ref 31.5–35.7)
MCV RBC AUTO: 93.6 FL (ref 79–97)
MONOCYTES # BLD AUTO: 1.45 10*3/MM3 (ref 0.1–0.9)
MONOCYTES NFR BLD AUTO: 11.7 % (ref 5–12)
NEUTROPHILS # BLD AUTO: 8.8 10*3/MM3 (ref 1.4–7)
NEUTROPHILS NFR BLD AUTO: 71.2 % (ref 42.7–76)
NRBC BLD AUTO-RTO: 0 /100 WBC (ref 0–0)
PLATELET # BLD AUTO: 149 10*3/MM3 (ref 140–450)
PMV BLD AUTO: 11 FL (ref 6–12)
POTASSIUM BLD-SCNC: 4.3 MMOL/L (ref 3.5–5.2)
PROT SERPL-MCNC: 7 G/DL (ref 6–8.5)
RBC # BLD AUTO: 5.33 10*6/MM3 (ref 4.14–5.8)
SODIUM BLD-SCNC: 140 MMOL/L (ref 136–145)
TIBC SERPL-MCNC: 389 MCG/DL (ref 298–536)
TRANSFERRIN SERPL-MCNC: 261 MG/DL (ref 200–360)
URATE SERPL-MCNC: 6.1 MG/DL (ref 3.4–7)
WBC NRBC COR # BLD: 12.36 10*3/MM3 (ref 3.4–10.8)

## 2019-03-29 PROCEDURE — 82668 ASSAY OF ERYTHROPOIETIN: CPT | Performed by: INTERNAL MEDICINE

## 2019-03-29 PROCEDURE — 82728 ASSAY OF FERRITIN: CPT | Performed by: INTERNAL MEDICINE

## 2019-03-29 PROCEDURE — 84466 ASSAY OF TRANSFERRIN: CPT | Performed by: INTERNAL MEDICINE

## 2019-03-29 PROCEDURE — 82375 ASSAY CARBOXYHB QUANT: CPT | Performed by: INTERNAL MEDICINE

## 2019-03-29 PROCEDURE — 84550 ASSAY OF BLOOD/URIC ACID: CPT | Performed by: INTERNAL MEDICINE

## 2019-03-29 PROCEDURE — 80053 COMPREHEN METABOLIC PANEL: CPT | Performed by: INTERNAL MEDICINE

## 2019-03-29 PROCEDURE — 83540 ASSAY OF IRON: CPT | Performed by: INTERNAL MEDICINE

## 2019-03-29 PROCEDURE — 85025 COMPLETE CBC W/AUTO DIFF WBC: CPT | Performed by: INTERNAL MEDICINE

## 2019-03-29 PROCEDURE — 83615 LACTATE (LD) (LDH) ENZYME: CPT | Performed by: INTERNAL MEDICINE

## 2019-03-29 PROCEDURE — 99204 OFFICE O/P NEW MOD 45 MIN: CPT | Performed by: INTERNAL MEDICINE

## 2019-03-29 PROCEDURE — 36415 COLL VENOUS BLD VENIPUNCTURE: CPT

## 2019-03-29 NOTE — PROGRESS NOTES
Subjective     REASON FOR CONSULTATION: Erythrocytosis  Provide an opinion on any further workup or treatment                             REQUESTING PHYSICIAN: Momo Macdonald MD    RECORDS OBTAINED:  Records of the patients history including those obtained from the referring provider were reviewed and summarized in detail.    HISTORY OF PRESENT ILLNESS:  The patient is a 73 y.o. year old male who is here for an opinion about the above issue.  He is referred to us from his primary care office for evaluation of persistent erythrocytosis and mild leukocytosis.  His CBC over the last couple of years has been abnormal with a hematocrit around 54-57 and hemoglobin of 18.5 -18.9.  He has been a lifelong smoker and continues to smoke 2 packs/day.  Certainly this is the most likely cause of his erythrocytosis but other etiologies need to be considered including polycythemia.    He has been feeling in his usual state of health although he tells me today that he did have a syncopal episode at home 2 days ago.  This has not recurred and he did not suffer any serious injuries in the fall.  He is not sure how long he was unconscious.  He did have a slight prodrome prior to passing out but did not have any signs of seizure activity.  He is planning to discuss this further with Dr. Macdonald and certainly may need further workup for this syncope.    His blood count in our office today actually is slightly improved with a hemoglobin of 17.7 and hematocrit of 49.9.  His white count is mildly elevated at 12,000 with an unremarkable differential.  I suspect his mild leukocytosis may also be due to smoking.    History of Present Illness     Past Medical History:   Diagnosis Date   • Benign essential hypertension    • Cholelithiasis    • Colon polyp    • Diabetes mellitus (CMS/HCC)    • Fatigue    • H/O pulmonary emphysema    • History of kidney stones 2017   • Hyperlipidemia    • Leukocytosis         Past Surgical History:   Procedure  Laterality Date   • COLONOSCOPY  07/30/2014    Dr. Logan Meza   • KNEE SURGERY Left    • TONSILLECTOMY          Current Outpatient Medications on File Prior to Visit   Medication Sig Dispense Refill   • aspirin 81 MG chewable tablet Chew 81 mg Daily.     • Coenzyme Q10 (COQ-10) 400 MG capsule Take by mouth.     • Dapagliflozin Propanediol (FARXIGA) 10 MG tablet Take 10 mg by mouth Daily. 90 tablet 3   • gabapentin (NEURONTIN) 300 MG capsule Take 1 capsule by mouth 3 (Three) Times a Day. 90 capsule 2   • glimepiride (AMARYL) 2 MG tablet Take 1 tablet by mouth Every Morning Before Breakfast. 90 tablet 3   • irbesartan (AVAPRO) 300 MG tablet Take 1 tablet by mouth Every Night. 90 tablet 3   • lovastatin (MEVACOR) 20 MG tablet Take 1 tablet by mouth Daily. In the evening before dinner 90 tablet 3   • MULTIPLE VITAMINS PO Take by mouth.     • naproxen sodium (ALEVE) 220 MG tablet Take 220 mg by mouth 2 (Two) Times a Day As Needed.     • SITagliptin-MetFORMIN HCl ER (JANUMET XR)  MG tablet TAKE 2 TABLETS BY MOUTH DAILY 180 tablet 3     No current facility-administered medications on file prior to visit.         ALLERGIES:    Allergies   Allergen Reactions   • Sulfa Antibiotics         Social History     Socioeconomic History   • Marital status:      Spouse name: Alia   • Number of children: Not on file   • Years of education: Not on file   • Highest education level: Not on file   Occupational History     Employer: RETIRED   Tobacco Use   • Smoking status: Current Every Day Smoker     Packs/day: 2.00     Types: Cigarettes   • Smokeless tobacco: Never Used   Substance and Sexual Activity   • Alcohol use: Yes     Comment: occasional   • Drug use: No        Family History   Problem Relation Age of Onset   • Alzheimer's disease Mother    • Cancer Mother         Breast   • Cancer Father         Hodgkin's Lymphoma   • Hodgkin's lymphoma Father         Review of Systems   Constitutional: Negative for activity  "change, chills, fatigue and fever.   HENT: Negative for mouth sores, trouble swallowing and voice change.    Eyes: Negative for pain and visual disturbance.   Respiratory: Negative for cough, shortness of breath and wheezing.    Cardiovascular: Negative for chest pain and palpitations.   Gastrointestinal: Negative for abdominal pain, constipation, diarrhea, nausea and vomiting.   Genitourinary: Negative for difficulty urinating, frequency and urgency.   Musculoskeletal: Negative for arthralgias and joint swelling.   Skin: Negative for rash.   Neurological: Positive for syncope and numbness. Negative for dizziness, seizures, weakness and headaches.        Peripheral neuropathy affecting the feet primarily.   Hematological: Negative for adenopathy. Does not bruise/bleed easily.   Psychiatric/Behavioral: Positive for sleep disturbance. Negative for behavioral problems and confusion. The patient is not nervous/anxious.         Objective     Vitals:    03/29/19 0806   BP: 133/74   Pulse: 100   Resp: 18   Temp: 98 °F (36.7 °C)   SpO2: 97%   Weight: 106 kg (232 lb 9.6 oz)   Height: 182.9 cm (72.01\")   PainSc: 0-No pain     Current Status 3/29/2019   ECOG score 0       Physical Exam   Constitutional: He is oriented to person, place, and time. He appears well-developed and well-nourished. No distress.   Mildly plethoric appearance.   HENT:   Head: Normocephalic.   Eyes: Conjunctivae and EOM are normal. Pupils are equal, round, and reactive to light. No scleral icterus.   Neck: Normal range of motion. Neck supple. No JVD present. No thyromegaly present.   Cardiovascular: Normal rate and regular rhythm. Exam reveals no gallop and no friction rub.   No murmur heard.  Pulmonary/Chest: Effort normal and breath sounds normal. He has no wheezes. He has no rales.   Abdominal: Soft. He exhibits no distension and no mass. There is no tenderness.   Musculoskeletal: Normal range of motion. He exhibits no edema or deformity. "   Lymphadenopathy:     He has no cervical adenopathy.   Neurological: He is alert and oriented to person, place, and time. He has normal reflexes. No cranial nerve deficit.   Skin: Skin is warm and dry. No rash noted. No erythema.   Psychiatric: He has a normal mood and affect. His behavior is normal. Judgment normal.         RECENT LABS:  Hematology WBC   Date Value Ref Range Status   03/29/2019 12.36 (H) 3.40 - 10.80 10*3/mm3 Final   03/05/2019 9.94 3.40 - 10.80 10*3/mm3 Final     RBC   Date Value Ref Range Status   03/29/2019 5.33 4.14 - 5.80 10*6/mm3 Final   03/05/2019 5.78 4.14 - 5.80 10*6/mm3 Final     Hemoglobin   Date Value Ref Range Status   03/29/2019 17.7 13.0 - 17.7 g/dL Final     Hematocrit   Date Value Ref Range Status   03/29/2019 49.9 37.5 - 51.0 % Final     Platelets   Date Value Ref Range Status   03/29/2019 149 140 - 450 10*3/mm3 Final          Assessment/Plan     1.  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  2.  Leukocytosis also likely related to smoking with demargination of the neutrophils.  3.  Recent syncopal episode.  Patient has not undergone evaluation for this episode and we advised him to discuss this with Dr. Macdonald to see if he may need cardiac evaluation.    Recommendations  1.  We will perform some additional lab studies today including a Ketan 2 mutation to check for polycythemia vera.  We also will order erythropoietin level and a carboxyhemoglobin.  2.  For whatever reason, his hemoglobin and hematocrit were lower in our office today and he does not require therapeutic phlebotomy at this time but we will asked the patient return to see us in 4 weeks to review the results of the above-noted lab studies and also to check another CBC.  If his hematocrit is significantly above 50 on the next visit and we may discuss with him starting a program of therapeutic phlebotomy to maintain a hematocrit less than 50.  3.  We counseled the patient regarding the  benefits of smoking cessation.    Thanks, Alejandro for allowing us to see this nice gentleman in consultation.

## 2019-03-29 NOTE — TELEPHONE ENCOUNTER
LEFT MESSAGE WITH MARICEL LETTING HER KNOW.    ----- Message from Momo Macdonald MD sent at 3/29/2019  7:56 AM EDT -----  Contact: PT  Stop medicine ; if any more near fainting episodes let us know  ----- Message -----  From: Jana Lomeli MA  Sent: 3/28/2019   1:08 PM  To: Momo Macdonald MD    Patient did call back and said he was OK now  Should he continue med?    ----- Message -----  From: Shanna Flores  Sent: 3/28/2019  11:54 AM  To: DAMIEN Seaman DR PUT HIM ON SOME NEW MEDICATION GLIMPIRIDE 2 MG  HAS BEEN TAKING IT FOR ABOUT 1 WK . TODAY HE WAS JUST STANDING GETTING A CUP OF COFFEE AND HE PASSED OUT- NO WARNING NO OTHER SYMPTOMS- HE SAW THAT FAINTING IS A SIDE EFFECT OF THIS MED.    PLEASE ADVISE WHAT HE NEEDS TO DO

## 2019-04-01 ENCOUNTER — TELEPHONE (OUTPATIENT)
Dept: FAMILY MEDICINE CLINIC | Facility: CLINIC | Age: 74
End: 2019-04-01

## 2019-04-01 LAB
COHGB MFR BLD: 9 % (ref 0–3.6)
ETHNIC BACKGROUND STATED: 13.2 MIU/ML (ref 2.6–18.5)

## 2019-04-01 NOTE — TELEPHONE ENCOUNTER
Dr. Macdonald already addressed this by letting him know he should stop his glimepiride.     ----- Message from Judi Zimmerman sent at 3/29/2019 10:34 AM EDT -----  PT CALLED IN STATING HE RECEIVED OUR MESSAGE ABOUT STOPPING HIS MED  BUT HE SAW HIS ONCOLOGIST TODAY WHOM ADVISED HIM TO CALL AND TALK TO DR. WOLF ABOUT HIM PASSING OUT YESTERDAY.  PT WOULD NOT GIVE ME ANY MORE INFORMATION.   HE IS REQUESTING THAT DR. WOLF CALL HIM HE WILL ASK  HIS QUESTIONS ABOUT HIS PASSING OUT YESTERDAY.  PLEASE CONTACT PT -[phone]

## 2019-04-02 LAB — REF LAB TEST METHOD: NORMAL

## 2019-04-08 ENCOUNTER — APPOINTMENT (OUTPATIENT)
Dept: GENERAL RADIOLOGY | Facility: HOSPITAL | Age: 74
End: 2019-04-08

## 2019-04-08 PROCEDURE — 72050 X-RAY EXAM NECK SPINE 4/5VWS: CPT | Performed by: FAMILY MEDICINE

## 2019-04-26 ENCOUNTER — LAB (OUTPATIENT)
Dept: OTHER | Facility: HOSPITAL | Age: 74
End: 2019-04-26

## 2019-04-26 ENCOUNTER — APPOINTMENT (OUTPATIENT)
Dept: ONCOLOGY | Facility: HOSPITAL | Age: 74
End: 2019-04-26

## 2019-04-26 ENCOUNTER — TELEPHONE (OUTPATIENT)
Dept: FAMILY MEDICINE CLINIC | Facility: CLINIC | Age: 74
End: 2019-04-26

## 2019-04-26 ENCOUNTER — OFFICE VISIT (OUTPATIENT)
Dept: ONCOLOGY | Facility: CLINIC | Age: 74
End: 2019-04-26

## 2019-04-26 VITALS
OXYGEN SATURATION: 97 % | WEIGHT: 230.3 LBS | DIASTOLIC BLOOD PRESSURE: 82 MMHG | SYSTOLIC BLOOD PRESSURE: 156 MMHG | HEIGHT: 72 IN | HEART RATE: 100 BPM | TEMPERATURE: 98 F | RESPIRATION RATE: 14 BRPM | BODY MASS INDEX: 31.19 KG/M2

## 2019-04-26 DIAGNOSIS — D58.2 ELEVATED HEMOGLOBIN (HCC): ICD-10-CM

## 2019-04-26 DIAGNOSIS — D72.828 GRANULOCYTOSIS: ICD-10-CM

## 2019-04-26 DIAGNOSIS — D75.1 ERYTHROCYTOSIS: Primary | ICD-10-CM

## 2019-04-26 DIAGNOSIS — D75.1 ERYTHROCYTOSIS: ICD-10-CM

## 2019-04-26 LAB
BASOPHILS # BLD AUTO: 0.07 10*3/MM3 (ref 0–0.2)
BASOPHILS NFR BLD AUTO: 0.6 % (ref 0–1.5)
DEPRECATED RDW RBC AUTO: 43.1 FL (ref 37–54)
EOSINOPHIL # BLD AUTO: 0.42 10*3/MM3 (ref 0–0.4)
EOSINOPHIL NFR BLD AUTO: 3.6 % (ref 0.3–6.2)
ERYTHROCYTE [DISTWIDTH] IN BLOOD BY AUTOMATED COUNT: 12.6 % (ref 12.3–15.4)
HCT VFR BLD AUTO: 50.1 % (ref 37.5–51)
HGB BLD-MCNC: 17.8 G/DL (ref 13–17.7)
IMM GRANULOCYTES # BLD AUTO: 0.11 10*3/MM3 (ref 0–0.05)
IMM GRANULOCYTES NFR BLD AUTO: 0.9 % (ref 0–0.5)
LYMPHOCYTES # BLD AUTO: 2.05 10*3/MM3 (ref 0.7–3.1)
LYMPHOCYTES NFR BLD AUTO: 17.6 % (ref 19.6–45.3)
MCH RBC QN AUTO: 33.1 PG (ref 26.6–33)
MCHC RBC AUTO-ENTMCNC: 35.5 G/DL (ref 31.5–35.7)
MCV RBC AUTO: 93.3 FL (ref 79–97)
MONOCYTES # BLD AUTO: 1.17 10*3/MM3 (ref 0.1–0.9)
MONOCYTES NFR BLD AUTO: 10 % (ref 5–12)
NEUTROPHILS # BLD AUTO: 7.83 10*3/MM3 (ref 1.7–7)
NEUTROPHILS NFR BLD AUTO: 67.3 % (ref 42.7–76)
NRBC BLD AUTO-RTO: 0 /100 WBC (ref 0–0.2)
PLATELET # BLD AUTO: 178 10*3/MM3 (ref 140–450)
PMV BLD AUTO: 10.3 FL (ref 6–12)
RBC # BLD AUTO: 5.37 10*6/MM3 (ref 4.14–5.8)
WBC NRBC COR # BLD: 11.65 10*3/MM3 (ref 3.4–10.8)

## 2019-04-26 PROCEDURE — 99214 OFFICE O/P EST MOD 30 MIN: CPT | Performed by: INTERNAL MEDICINE

## 2019-04-26 PROCEDURE — 85025 COMPLETE CBC W/AUTO DIFF WBC: CPT | Performed by: INTERNAL MEDICINE

## 2019-04-26 NOTE — PROGRESS NOTES
Subjective     REASON FOR FOLLOW UP: Secondary erythrocytosis due to smoking.      HISTORY OF PRESENT ILLNESS:  The patient is a 73 y.o. year old male whowas referred to us from his primary care office for evaluation of persistent erythrocytosis and mild leukocytosis.  His CBC over the last couple of years has been abnormal with a hematocrit around 54-57 and hemoglobin of 18.5 -18.9.  He has been a lifelong smoker and continues to smoke 2 packs/day.  We felt this was the most likely cause of his erythrocytosis but other etiologies needed to be considered including polycythemia.    With his initial consult visit on 3/29/2019 we performed additional labs to evaluate the etiology of his erythrocytosis.  His Ketan 2 mutation was negative and the serum erythropoietin level was normal.  His carbon monoxide level was significantly elevated at 9.0 consistent with smoking.  Iron studies were within normal limits.    His hematocrit in our office today is 50.1.  We explained to the patient that this is a secondary process from smoking and again discussed the health benefits of smoking cessation.  We also discussed that while it does not appear that he needs regular therapeutic phlebotomy in our office it may be beneficial for him to undergo blood donation at the [facility] every 3 to 4 months to help maintain his hematocrit in a more acceptable range.    History of Present Illness     Past Medical History:   Diagnosis Date   • Benign essential hypertension    • Cholelithiasis    • Colon polyp    • Diabetes mellitus (CMS/HCC)    • Fatigue    • H/O pulmonary emphysema    • History of kidney stones 2017    X2   • Hyperlipidemia    • Leukocytosis         Past Surgical History:   Procedure Laterality Date   • COLONOSCOPY  07/30/2014    Dr. Logan Meza   • KNEE SURGERY Left    • TONSILLECTOMY          Current Outpatient Medications on File Prior to Visit   Medication Sig Dispense Refill   • aspirin 81 MG chewable tablet Chew 81  mg Daily.     • Coenzyme Q10 (COQ-10) 400 MG capsule Take by mouth.     • Dapagliflozin Propanediol (FARXIGA) 10 MG tablet Take 10 mg by mouth Daily. 90 tablet 3   • gabapentin (NEURONTIN) 300 MG capsule Take 1 capsule by mouth 3 (Three) Times a Day. 90 capsule 2   • irbesartan (AVAPRO) 300 MG tablet Take 1 tablet by mouth Every Night. 90 tablet 3   • lovastatin (MEVACOR) 20 MG tablet Take 1 tablet by mouth Daily. In the evening before dinner 90 tablet 3   • MULTIPLE VITAMINS PO Take by mouth.     • naproxen sodium (ALEVE) 220 MG tablet Take 220 mg by mouth 2 (Two) Times a Day As Needed.     • SITagliptin-MetFORMIN HCl ER (JANUMET XR)  MG tablet TAKE 2 TABLETS BY MOUTH DAILY 180 tablet 3   • [DISCONTINUED] amoxicillin (AMOXIL) 875 MG tablet Take 1 tablet by mouth 2 (Two) Times a Day. 14 tablet 0     No current facility-administered medications on file prior to visit.         ALLERGIES:    Allergies   Allergen Reactions   • Sulfa Antibiotics         Social History     Socioeconomic History   • Marital status:      Spouse name: Alia   • Number of children: Not on file   • Years of education: College   • Highest education level: Not on file   Occupational History     Employer: RETIRED   Tobacco Use   • Smoking status: Current Every Day Smoker     Packs/day: 2.00     Types: Cigarettes   • Smokeless tobacco: Never Used   • Tobacco comment: 52+ years   Substance and Sexual Activity   • Alcohol use: Yes     Comment: occasional   • Drug use: No        Family History   Problem Relation Age of Onset   • Alzheimer's disease Mother    • Cancer Mother         Breast; diagnosed in her 60s   • Hodgkin's lymphoma Father 47        Review of Systems   Constitutional: Negative for activity change, chills, fatigue and fever.   HENT: Negative for mouth sores, trouble swallowing and voice change.    Eyes: Negative for pain and visual disturbance.   Respiratory: Negative for cough, shortness of breath and wheezing.   "  Cardiovascular: Negative for chest pain and palpitations.   Gastrointestinal: Negative for abdominal pain, constipation, diarrhea, nausea and vomiting.   Genitourinary: Negative for difficulty urinating, frequency and urgency.   Musculoskeletal: Negative for arthralgias and joint swelling.   Skin: Negative for rash.   Neurological: Positive for syncope and numbness. Negative for dizziness, seizures, weakness and headaches.        Peripheral neuropathy affecting the feet primarily.   Hematological: Negative for adenopathy. Does not bruise/bleed easily.   Psychiatric/Behavioral: Positive for sleep disturbance. Negative for behavioral problems and confusion. The patient is not nervous/anxious.         Objective     Vitals:    04/26/19 0823   BP: 156/82   Pulse: 100   Resp: 14   Temp: 98 °F (36.7 °C)   SpO2: 97%   Weight: 104 kg (230 lb 4.8 oz)   Height: 182.9 cm (72.01\")   PainSc:   2   PainLoc: Neck     Current Status 4/26/2019   ECOG score 0       Physical Exam   Constitutional: He is oriented to person, place, and time. He appears well-developed and well-nourished. No distress.   Mildly plethoric appearance.   HENT:   Head: Normocephalic.   Eyes: Conjunctivae and EOM are normal. Pupils are equal, round, and reactive to light. No scleral icterus.   Neck: Normal range of motion. Neck supple. No JVD present. No thyromegaly present.   Cardiovascular: Normal rate and regular rhythm. Exam reveals no gallop and no friction rub.   No murmur heard.  Pulmonary/Chest: Effort normal and breath sounds normal. He has no wheezes. He has no rales.   Abdominal: Soft. He exhibits no distension and no mass. There is no tenderness.   Musculoskeletal: Normal range of motion. He exhibits no edema or deformity.   Lymphadenopathy:     He has no cervical adenopathy.   Neurological: He is alert and oriented to person, place, and time. He has normal reflexes. No cranial nerve deficit.   Skin: Skin is warm and dry. No rash noted. No " erythema.   Psychiatric: He has a normal mood and affect. His behavior is normal. Judgment normal.         RECENT LABS:  Hematology WBC   Date Value Ref Range Status   04/26/2019 11.65 (H) 3.40 - 10.80 10*3/mm3 Final   03/05/2019 9.94 3.40 - 10.80 10*3/mm3 Final     RBC   Date Value Ref Range Status   04/26/2019 5.37 4.14 - 5.80 10*6/mm3 Final   03/05/2019 5.78 4.14 - 5.80 10*6/mm3 Final     Hemoglobin   Date Value Ref Range Status   04/26/2019 17.8 (H) 13.0 - 17.7 g/dL Final     Hematocrit   Date Value Ref Range Status   04/26/2019 50.1 37.5 - 51.0 % Final     Platelets   Date Value Ref Range Status   04/26/2019 178 140 - 450 10*3/mm3 Final          Assessment/Plan     1.  Erythrocytosis most likely secondary erythrocytosis due to smoking.  2.  Leukocytosis also likely related to smoking with demargination of the neutrophils.  3.  No evidence of underlying myeloproliferative disorder based on his laboratory evaluation.    Recommendations  1.  We reviewed the results of the previous labs with patient and also reviewed his blood count today.  His hematocrit is staying around 50 at this point and I do not think he would need therapeutic phlebotomy in our office.  We did discuss that it would be beneficial for him to donate blood at the [facility] every 3 to 4 months and that may be enough to keep his hematocrit in a more acceptable range.  2.  We again had a lengthy discussion regarding the health benefits of smoking cessation even after 50 years of smoking.  3.  We have not scheduled routine follow-up in our office as it appears at this point that he does not have a myeloproliferative disorder and does not require regular phlebotomy.  We certainly would be happy to see him again at any point in the future if he has significant worsening of his counts over time.      Thanks, Alejandro for allowing us to see this nice gentleman in consultation.

## 2019-04-26 NOTE — TELEPHONE ENCOUNTER
"I spoke with patient on 4/24/19 (called pertaining to a message about his wife). He requested that I show Dr. Macdonald his cervical spine X-ray that he had done at the  4/8/19. He said he did not need a call back regarding this, that he just wanted his doctor to review his results.    Per Dr. Macdonald, \"basically just arthritis and typical aging changes shown on x-ray\"  "

## 2019-06-11 ENCOUNTER — TELEPHONE (OUTPATIENT)
Dept: FAMILY MEDICINE CLINIC | Facility: CLINIC | Age: 74
End: 2019-06-11

## 2019-06-11 RX ORDER — LOVASTATIN 20 MG/1
20 TABLET ORAL DAILY
Qty: 90 TABLET | Refills: 3 | Status: SHIPPED | OUTPATIENT
Start: 2019-06-11 | End: 2019-11-25 | Stop reason: SDUPTHER

## 2019-06-11 RX ORDER — LOVASTATIN 20 MG/1
20 TABLET ORAL DAILY
Qty: 90 TABLET | Refills: 3 | Status: SHIPPED | OUTPATIENT
Start: 2019-06-11 | End: 2019-06-11 | Stop reason: SDUPTHER

## 2019-06-11 RX ORDER — GABAPENTIN 300 MG/1
300 CAPSULE ORAL 3 TIMES DAILY
Qty: 90 CAPSULE | Refills: 0 | Status: SHIPPED | OUTPATIENT
Start: 2019-06-11 | End: 2019-11-11 | Stop reason: SDUPTHER

## 2019-06-11 NOTE — TELEPHONE ENCOUNTER
LOVASTATIN HAS BEEN SENT OVER AND GABAPENTIN HAS BEEN PRINTED AND SIGNED AND IS UP FRONT AND READY FOR PATIENT TO .     ----- Message from Shanna Flores sent at 6/10/2019  4:18 PM EDT -----  PT BROUGHT RX'S IN ON Friday TO BE FILLED FOR GABAPENTIN AND NEEDS IT BEFORE HE HEADS OUT OF TOWN TOMORROW. ALSO HIS LOVASTATIN. HE SAID HE HAS NEVER BEEN TOLD THAT WE HAVE 24-28 HOURS TO GET AN RX WRITTEN    THANK YOU      [phone] CALL WITH ANY QUESTIONS

## 2019-06-26 DIAGNOSIS — E78.2 MIXED HYPERLIPIDEMIA: ICD-10-CM

## 2019-06-26 DIAGNOSIS — I10 BENIGN ESSENTIAL HYPERTENSION: ICD-10-CM

## 2019-06-26 DIAGNOSIS — E11.40 TYPE 2 DIABETES MELLITUS WITH DIABETIC NEUROPATHY, WITHOUT LONG-TERM CURRENT USE OF INSULIN (HCC): ICD-10-CM

## 2019-06-26 DIAGNOSIS — R53.83 FATIGUE, UNSPECIFIED TYPE: ICD-10-CM

## 2019-07-04 LAB
ALBUMIN SERPL-MCNC: 5 G/DL (ref 3.5–5.2)
ALBUMIN/GLOB SERPL: 2.9 G/DL
ALP SERPL-CCNC: 80 U/L (ref 39–117)
ALT SERPL-CCNC: 14 U/L (ref 1–41)
AST SERPL-CCNC: 11 U/L (ref 1–40)
BASOPHILS # BLD AUTO: 0.06 10*3/MM3 (ref 0–0.2)
BASOPHILS NFR BLD AUTO: 0.5 % (ref 0–1.5)
BILIRUB SERPL-MCNC: 1 MG/DL (ref 0.2–1.2)
BUN SERPL-MCNC: 18 MG/DL (ref 8–23)
BUN/CREAT SERPL: 20.9 (ref 7–25)
CALCIUM SERPL-MCNC: 9.5 MG/DL (ref 8.6–10.5)
CHLORIDE SERPL-SCNC: 97 MMOL/L (ref 98–107)
CHOLEST SERPL-MCNC: 154 MG/DL (ref 0–200)
CO2 SERPL-SCNC: 27.8 MMOL/L (ref 22–29)
CREAT SERPL-MCNC: 0.86 MG/DL (ref 0.76–1.27)
EOSINOPHIL # BLD AUTO: 0.38 10*3/MM3 (ref 0–0.4)
EOSINOPHIL NFR BLD AUTO: 3.4 % (ref 0.3–6.2)
ERYTHROCYTE [DISTWIDTH] IN BLOOD BY AUTOMATED COUNT: 13.3 % (ref 12.3–15.4)
GLOBULIN SER CALC-MCNC: 1.7 GM/DL
GLUCOSE SERPL-MCNC: 225 MG/DL (ref 65–99)
HBA1C MFR BLD: 8.7 % (ref 4.8–5.6)
HCT VFR BLD AUTO: 56.7 % (ref 37.5–51)
HDLC SERPL-MCNC: 29 MG/DL (ref 40–60)
HGB BLD-MCNC: 18.7 G/DL (ref 13–17.7)
IMM GRANULOCYTES # BLD AUTO: 0.05 10*3/MM3 (ref 0–0.05)
IMM GRANULOCYTES NFR BLD AUTO: 0.5 % (ref 0–0.5)
LDLC SERPL CALC-MCNC: 62 MG/DL (ref 0–100)
LDLC/HDLC SERPL: 2.14 {RATIO}
LYMPHOCYTES # BLD AUTO: 1.94 10*3/MM3 (ref 0.7–3.1)
LYMPHOCYTES NFR BLD AUTO: 17.5 % (ref 19.6–45.3)
MCH RBC QN AUTO: 33 PG (ref 26.6–33)
MCHC RBC AUTO-ENTMCNC: 33 G/DL (ref 31.5–35.7)
MCV RBC AUTO: 100 FL (ref 79–97)
MONOCYTES # BLD AUTO: 1.02 10*3/MM3 (ref 0.1–0.9)
MONOCYTES NFR BLD AUTO: 9.2 % (ref 5–12)
NEUTROPHILS # BLD AUTO: 7.61 10*3/MM3 (ref 1.7–7)
NEUTROPHILS NFR BLD AUTO: 68.9 % (ref 42.7–76)
NRBC BLD AUTO-RTO: 0 /100 WBC (ref 0–0.2)
PLATELET # BLD AUTO: 169 10*3/MM3 (ref 140–450)
POTASSIUM SERPL-SCNC: 5.3 MMOL/L (ref 3.5–5.2)
PROT SERPL-MCNC: 6.7 G/DL (ref 6–8.5)
RBC # BLD AUTO: 5.67 10*6/MM3 (ref 4.14–5.8)
SODIUM SERPL-SCNC: 139 MMOL/L (ref 136–145)
T4 FREE SERPL-MCNC: 1.56 NG/DL (ref 0.93–1.7)
TESTOST FREE SERPL-MCNC: 8.8 PG/ML (ref 6.6–18.1)
TESTOST SERPL-MCNC: 416 NG/DL (ref 264–916)
TRIGL SERPL-MCNC: 314 MG/DL (ref 0–150)
TSH SERPL DL<=0.005 MIU/L-ACNC: 1.8 MIU/ML (ref 0.27–4.2)
VLDLC SERPL CALC-MCNC: 62.8 MG/DL
WBC # BLD AUTO: 11.06 10*3/MM3 (ref 3.4–10.8)

## 2019-07-11 ENCOUNTER — OFFICE VISIT (OUTPATIENT)
Dept: FAMILY MEDICINE CLINIC | Facility: CLINIC | Age: 74
End: 2019-07-11

## 2019-07-11 VITALS
BODY MASS INDEX: 30.61 KG/M2 | HEIGHT: 72 IN | WEIGHT: 226 LBS | DIASTOLIC BLOOD PRESSURE: 78 MMHG | OXYGEN SATURATION: 97 % | SYSTOLIC BLOOD PRESSURE: 138 MMHG | HEART RATE: 105 BPM | TEMPERATURE: 97.4 F | RESPIRATION RATE: 16 BRPM

## 2019-07-11 DIAGNOSIS — D75.1 ERYTHROCYTOSIS: ICD-10-CM

## 2019-07-11 DIAGNOSIS — Z23 NEED FOR VACCINATION: ICD-10-CM

## 2019-07-11 DIAGNOSIS — E78.2 MIXED HYPERLIPIDEMIA: ICD-10-CM

## 2019-07-11 DIAGNOSIS — Z87.891 PERSONAL HISTORY OF NICOTINE DEPENDENCE: ICD-10-CM

## 2019-07-11 DIAGNOSIS — E11.40 TYPE 2 DIABETES MELLITUS WITH DIABETIC NEUROPATHY, WITHOUT LONG-TERM CURRENT USE OF INSULIN (HCC): ICD-10-CM

## 2019-07-11 DIAGNOSIS — Z12.2 ENCOUNTER FOR SCREENING FOR LUNG CANCER: ICD-10-CM

## 2019-07-11 DIAGNOSIS — I10 BENIGN ESSENTIAL HYPERTENSION: Primary | ICD-10-CM

## 2019-07-11 PROCEDURE — 90670 PCV13 VACCINE IM: CPT | Performed by: INTERNAL MEDICINE

## 2019-07-11 PROCEDURE — 99214 OFFICE O/P EST MOD 30 MIN: CPT | Performed by: INTERNAL MEDICINE

## 2019-07-11 PROCEDURE — G0009 ADMIN PNEUMOCOCCAL VACCINE: HCPCS | Performed by: INTERNAL MEDICINE

## 2019-07-11 RX ORDER — PIOGLITAZONEHYDROCHLORIDE 30 MG/1
30 TABLET ORAL DAILY
Qty: 90 TABLET | Refills: 3 | Status: SHIPPED | OUTPATIENT
Start: 2019-07-11 | End: 2019-08-14

## 2019-07-11 NOTE — PROGRESS NOTES
Jan Paiz is a 74 y.o. male. Patient is here today for   Chief Complaint   Patient presents with   • Hyperlipidemia     HYPERTENSION, DIABETES- FOLLOW UP LABS          Vitals:    07/11/19 1025   BP: 138/78   Pulse: 105   Resp: 16   Temp: 97.4 °F (36.3 °C)   SpO2: 97%       The following portions of the patient's history were reviewed and updated as appropriate: allergies, current medications, past family history, past medical history, past social history, past surgical history and problem list.    Past Medical History:   Diagnosis Date   • Benign essential hypertension    • Cholelithiasis    • Colon polyp    • Diabetes mellitus (CMS/HCC)    • Fatigue    • H/O pulmonary emphysema    • History of kidney stones 2017    X2   • Hyperlipidemia    • Leukocytosis       Allergies   Allergen Reactions   • Sulfa Antibiotics       Social History     Socioeconomic History   • Marital status:      Spouse name: Alia   • Number of children: Not on file   • Years of education: College   • Highest education level: Not on file   Occupational History     Employer: RETIRED   Tobacco Use   • Smoking status: Current Every Day Smoker     Packs/day: 2.00     Types: Cigarettes   • Smokeless tobacco: Never Used   • Tobacco comment: 52+ years   Substance and Sexual Activity   • Alcohol use: Yes     Comment: occasional   • Drug use: No        Current Outpatient Medications:   •  aspirin 81 MG chewable tablet, Chew 81 mg Daily., Disp: , Rfl:   •  Coenzyme Q10 (COQ-10) 400 MG capsule, Take by mouth., Disp: , Rfl:   •  Dapagliflozin Propanediol (FARXIGA) 10 MG tablet, Take 10 mg by mouth Daily., Disp: 90 tablet, Rfl: 3  •  gabapentin (NEURONTIN) 300 MG capsule, Take 1 capsule by mouth 3 (Three) Times a Day., Disp: 90 capsule, Rfl: 0  •  irbesartan (AVAPRO) 300 MG tablet, Take 1 tablet by mouth Every Night., Disp: 90 tablet, Rfl: 3  •  lovastatin (MEVACOR) 20 MG tablet, Take 1 tablet by mouth Daily. In the evening before  dinner, Disp: 90 tablet, Rfl: 3  •  MULTIPLE VITAMINS PO, Take by mouth., Disp: , Rfl:   •  naproxen sodium (ALEVE) 220 MG tablet, Take 220 mg by mouth 2 (Two) Times a Day As Needed., Disp: , Rfl:   •  SITagliptin-MetFORMIN HCl ER (JANUMET XR)  MG tablet, TAKE 2 TABLETS BY MOUTH DAILY, Disp: 180 tablet, Rfl: 3  •  pioglitazone (ACTOS) 30 MG tablet, Take 1 tablet by mouth Daily., Disp: 90 tablet, Rfl: 3     Objective   History of Present Illness Sen is here for blood pressure and lab follow-up.  He has hypertension, hyperlipidemia, diabetes mellitus, thrombocytosis, and tobacco dependency.  He feels well.  He does eat some sugar in his diet is physically active but does not formally exercise.  He saw hematology who recommended donating blood every few months.  He has not had lung cancer screening since 2015.  He had vascular screening 2 years ago which was normal.    Review of Systems   Constitutional: Negative for activity change and unexpected weight change.   Respiratory: Negative for shortness of breath.    Cardiovascular: Negative.    Neurological: Negative.    Psychiatric/Behavioral: Negative.        Physical Exam   Constitutional: He appears well-developed and well-nourished.   Cardiovascular: Normal rate, regular rhythm and normal heart sounds.   134/76,132/68   Pulmonary/Chest: Effort normal and breath sounds normal.   Psychiatric: He has a normal mood and affect. His behavior is normal. Judgment and thought content normal.   Vitals reviewed.      ASSESSMENT     Problem List Items Addressed This Visit        Cardiovascular and Mediastinum    Benign essential hypertension - Primary    Mixed hyperlipidemia       Endocrine    Diabetes mellitus (CMS/HCC)    Relevant Medications    pioglitazone (ACTOS) 30 MG tablet       Hematopoietic and Hemostatic    Erythrocytosis      Other Visit Diagnoses     Encounter for screening for lung cancer        Relevant Orders    CT Chest Low Dose Wo    Personal history  of nicotine dependence         Relevant Orders    CT Chest Low Dose Wo          PLAN  Patient Instructions   Blood pressure is high today.  Discussed monitoring blood pressure correctly at home as instructed.  Hemoglobin is elevated at 18.6.  Fasting blood sugar is high and hemoglobin A1c is 8.7.  HDL cholesterol is low and triglycerides are high.  Will start p.o. glitazone 30 mg daily and continue Farxiga and Janumet XR.  Discussed diet, exercise, and weight loss.  We will set up lung cancer screening.  Discussed tobacco cessation.      Return in about 1 month (around 8/11/2019) for labsBMP,A1C,MICROALBUMEN/ CREATININE RATIO.

## 2019-07-11 NOTE — PATIENT INSTRUCTIONS
Blood pressure is high today.  Discussed monitoring blood pressure correctly at home as instructed.  Hemoglobin is elevated at 18.6.  Fasting blood sugar is high and hemoglobin A1c is 8.7.  HDL cholesterol is low and triglycerides are high.  Will start p.o. glitazone 30 mg daily and continue Farxiga and Janumet XR.  Discussed diet, exercise, and weight loss.  We will set up lung cancer screening.  Discussed tobacco cessation.

## 2019-07-12 ENCOUNTER — TRANSCRIBE ORDERS (OUTPATIENT)
Dept: ADMINISTRATIVE | Facility: HOSPITAL | Age: 74
End: 2019-07-12

## 2019-07-12 DIAGNOSIS — Z13.6 ENCOUNTER FOR SCREENING FOR VASCULAR DISEASE: Primary | ICD-10-CM

## 2019-07-15 ENCOUNTER — TELEPHONE (OUTPATIENT)
Dept: FAMILY MEDICINE CLINIC | Facility: CLINIC | Age: 74
End: 2019-07-15

## 2019-07-15 RX ORDER — IRBESARTAN 300 MG/1
300 TABLET ORAL NIGHTLY
Qty: 90 TABLET | Refills: 3 | Status: SHIPPED | OUTPATIENT
Start: 2019-07-15 | End: 2019-07-23 | Stop reason: SDUPTHER

## 2019-07-15 NOTE — TELEPHONE ENCOUNTER
SENT RX'S TO PHARMACY FOR PATIENT.       ----- Message from Cyndee Martinez sent at 7/15/2019  8:37 AM EDT -----  NEEDS RX  FOR     JANUMET XR 50/1000 BID #180   IBERSARTAN 300MG QD # 90     PLEASE SEND TO [facility] ON [address]     PLEASE CALL PT WITH ANY QUESTIONS [phone]

## 2019-07-23 ENCOUNTER — HOSPITAL ENCOUNTER (OUTPATIENT)
Dept: CARDIOLOGY | Facility: HOSPITAL | Age: 74
Discharge: HOME | End: 2019-07-23
Admitting: INTERNAL MEDICINE

## 2019-07-23 VITALS
BODY MASS INDEX: 29.29 KG/M2 | HEART RATE: 95 BPM | SYSTOLIC BLOOD PRESSURE: 122 MMHG | DIASTOLIC BLOOD PRESSURE: 77 MMHG | WEIGHT: 221 LBS | HEIGHT: 73 IN

## 2019-07-23 DIAGNOSIS — Z13.6 ENCOUNTER FOR SCREENING FOR VASCULAR DISEASE: ICD-10-CM

## 2019-07-23 LAB
BH CV ECHO MEAS - DIST AO DIAM: 1.54 CM
BH CV VAS BP LEFT ARM: NORMAL MMHG
BH CV VAS BP RIGHT ARM: NORMAL MMHG
BH CV XLRA MEAS - MID AO DIAM: 1.78 CM
BH CV XLRA MEAS - PAD LEFT ABI DP: 1.05
BH CV XLRA MEAS - PAD LEFT ABI PT: 1.06
BH CV XLRA MEAS - PAD LEFT ARM: 122 MMHG
BH CV XLRA MEAS - PAD LEFT LEG DP: 128 MMHG
BH CV XLRA MEAS - PAD LEFT LEG PT: 130 MMHG
BH CV XLRA MEAS - PAD RIGHT ABI DP: 1.11
BH CV XLRA MEAS - PAD RIGHT ABI PT: 1.08
BH CV XLRA MEAS - PAD RIGHT ARM: 122 MMHG
BH CV XLRA MEAS - PAD RIGHT LEG DP: 136 MMHG
BH CV XLRA MEAS - PAD RIGHT LEG PT: 132 MMHG
BH CV XLRA MEAS - PROX AO DIAM: 2 CM
BH CV XLRA MEAS LEFT ICA/CCA RATIO: 0.8
BH CV XLRA MEAS LEFT MID CCA PSV: NORMAL CM/SEC
BH CV XLRA MEAS LEFT MID ICA PSV: NORMAL CM/SEC
BH CV XLRA MEAS LEFT PROX ECA PSV: NORMAL CM/SEC
BH CV XLRA MEAS RIGHT ICA/CCA RATIO: 0.66
BH CV XLRA MEAS RIGHT MID CCA PSV: NORMAL CM/SEC
BH CV XLRA MEAS RIGHT MID ICA PSV: NORMAL CM/SEC
BH CV XLRA MEAS RIGHT PROX ECA PSV: NORMAL CM/SEC

## 2019-07-23 PROCEDURE — 93799 UNLISTED CV SVC/PROCEDURE: CPT

## 2019-07-23 RX ORDER — IRBESARTAN 300 MG/1
TABLET ORAL
Qty: 90 TABLET | Refills: 3 | Status: SHIPPED | OUTPATIENT
Start: 2019-07-23 | End: 2019-11-25 | Stop reason: SDUPTHER

## 2019-07-24 ENCOUNTER — HOSPITAL ENCOUNTER (OUTPATIENT)
Dept: CT IMAGING | Facility: HOSPITAL | Age: 74
Discharge: HOME | End: 2019-07-24
Admitting: INTERNAL MEDICINE

## 2019-07-24 PROCEDURE — G0297 LDCT FOR LUNG CA SCREEN: HCPCS

## 2019-07-31 DIAGNOSIS — E11.40 TYPE 2 DIABETES MELLITUS WITH DIABETIC NEUROPATHY, WITHOUT LONG-TERM CURRENT USE OF INSULIN (HCC): ICD-10-CM

## 2019-07-31 DIAGNOSIS — E78.2 MIXED HYPERLIPIDEMIA: ICD-10-CM

## 2019-07-31 DIAGNOSIS — I10 BENIGN ESSENTIAL HYPERTENSION: Primary | ICD-10-CM

## 2019-08-10 LAB
BUN SERPL-MCNC: 21 MG/DL (ref 8–23)
BUN/CREAT SERPL: 23.3 (ref 7–25)
CALCIUM SERPL-MCNC: 9.9 MG/DL (ref 8.6–10.5)
CHLORIDE SERPL-SCNC: 96 MMOL/L (ref 98–107)
CO2 SERPL-SCNC: 26.9 MMOL/L (ref 22–29)
CREAT SERPL-MCNC: 0.9 MG/DL (ref 0.76–1.27)
GLUCOSE SERPL-MCNC: 220 MG/DL (ref 65–99)
HBA1C MFR BLD: 8.5 % (ref 4.8–5.6)
MICROALBUMIN UR-MCNC: 73.7 UG/ML
POTASSIUM SERPL-SCNC: 4.9 MMOL/L (ref 3.5–5.2)
SODIUM SERPL-SCNC: 139 MMOL/L (ref 136–145)

## 2019-08-14 ENCOUNTER — OFFICE VISIT (OUTPATIENT)
Dept: FAMILY MEDICINE CLINIC | Facility: CLINIC | Age: 74
End: 2019-08-14

## 2019-08-14 VITALS
WEIGHT: 222 LBS | DIASTOLIC BLOOD PRESSURE: 70 MMHG | BODY MASS INDEX: 30.07 KG/M2 | HEIGHT: 72 IN | TEMPERATURE: 97.6 F | HEART RATE: 101 BPM | OXYGEN SATURATION: 98 % | RESPIRATION RATE: 17 BRPM | SYSTOLIC BLOOD PRESSURE: 130 MMHG

## 2019-08-14 DIAGNOSIS — E11.40 TYPE 2 DIABETES MELLITUS WITH DIABETIC NEUROPATHY, WITHOUT LONG-TERM CURRENT USE OF INSULIN (HCC): ICD-10-CM

## 2019-08-14 DIAGNOSIS — F17.200 TOBACCO DEPENDENCY: ICD-10-CM

## 2019-08-14 DIAGNOSIS — I10 BENIGN ESSENTIAL HYPERTENSION: Primary | ICD-10-CM

## 2019-08-14 PROCEDURE — 99214 OFFICE O/P EST MOD 30 MIN: CPT | Performed by: INTERNAL MEDICINE

## 2019-08-14 RX ORDER — LANCETS
EACH MISCELLANEOUS
Qty: 306 EACH | Refills: 0 | Status: SHIPPED | OUTPATIENT
Start: 2019-08-14 | End: 2020-09-24

## 2019-08-14 NOTE — PATIENT INSTRUCTIONS
A prescription for Accu-Chek, test strips, and lancets was written.  Discussed checking fasting readings as well as 2-hour postprandial readings.  Discussed diet.  Suggest signing up for diabetic education classes.  Discussed tobacco cessation including nicotine patches, Wellbutrin, or Chantix when you are ready to quit.

## 2019-08-14 NOTE — PROGRESS NOTES
Jan Paiz is a 74 y.o. male. Patient is here today for   Chief Complaint   Patient presents with   • Diabetes   • Hypertension   • Hyperlipidemia          Vitals:    08/14/19 0953   BP: 130/70   Pulse: 101   Resp: 17   Temp: 97.6 °F (36.4 °C)   SpO2: 98%     The following portions of the patient's history were reviewed and updated as appropriate: allergies, current medications, past family history, past medical history, past social history, past surgical history and problem list.    Past Medical History:   Diagnosis Date   • Benign essential hypertension    • Cholelithiasis    • Colon polyp    • Diabetes mellitus (CMS/HCC)    • Fatigue    • H/O pulmonary emphysema    • History of kidney stones 2017    X2   • Hyperlipidemia    • Leukocytosis       Allergies   Allergen Reactions   • Sulfa Antibiotics       Social History     Socioeconomic History   • Marital status:      Spouse name: Alia   • Number of children: Not on file   • Years of education: College   • Highest education level: Not on file   Occupational History     Employer: RETIRED   Tobacco Use   • Smoking status: Current Every Day Smoker     Packs/day: 2.00     Types: Cigarettes   • Smokeless tobacco: Never Used   • Tobacco comment: 52+ years   Substance and Sexual Activity   • Alcohol use: Yes     Comment: occasional   • Drug use: No        Current Outpatient Medications:   •  aspirin 81 MG chewable tablet, Chew 81 mg Daily., Disp: , Rfl:   •  Coenzyme Q10 (COQ-10) 400 MG capsule, Take by mouth., Disp: , Rfl:   •  Dapagliflozin Propanediol (FARXIGA) 10 MG tablet, Take 10 mg by mouth Daily., Disp: 90 tablet, Rfl: 3  •  gabapentin (NEURONTIN) 300 MG capsule, Take 1 capsule by mouth 3 (Three) Times a Day., Disp: 90 capsule, Rfl: 0  •  irbesartan (AVAPRO) 300 MG tablet, TAKE 1 TABLET BY MOUTH EVERY DAY AT NIGHT, Disp: 90 tablet, Rfl: 3  •  lovastatin (MEVACOR) 20 MG tablet, Take 1 tablet by mouth Daily. In the evening before dinner,  Disp: 90 tablet, Rfl: 3  •  MULTIPLE VITAMINS PO, Take by mouth., Disp: , Rfl:   •  naproxen sodium (ALEVE) 220 MG tablet, Take 220 mg by mouth 2 (Two) Times a Day As Needed., Disp: , Rfl:   •  ONETOUCH DELICA LANCETS FINE misc, , Disp: , Rfl:   •  SITagliptin-metFORMIN HCl ER (JANUMET XR)  MG tablet, TAKE 2 TABLETS BY MOUTH DAILY, Disp: 180 tablet, Rfl: 3     Objective     History of Present Illness Sen was given a prescription for a glucometer last month and it was denied by [employer].  He has type 2 diabetes mellitus.  Most recent A1c was 8.5.  He was started on Actos but developed hives and he stopped it.  He has been trying to eat healthier and exercise.  He has smoked for a long time and is thinking about quitting.  He recently had normal vascular screening tests and lung cancer screening.    Review of Systems   Constitutional: Negative.    Respiratory: Negative.    Cardiovascular: Negative.    Gastrointestinal: Negative.    Genitourinary: Negative.    Neurological: Positive for numbness.   Psychiatric/Behavioral: Negative.        Physical Exam   Constitutional: He appears well-developed and well-nourished.   Cardiovascular: Normal rate, regular rhythm and normal heart sounds.   135/70   Pulmonary/Chest: Effort normal and breath sounds normal.   Neurological: He is alert.   Psychiatric: He has a normal mood and affect. His behavior is normal. Judgment and thought content normal.       ASSESSMENT     Problem List Items Addressed This Visit        Cardiovascular and Mediastinum    Benign essential hypertension - Primary       Endocrine    Diabetes mellitus (CMS/HCC)       Other    Tobacco dependency          PLAN  Patient Instructions   A prescription for Accu-Chek, test strips, and lancets was written.  Discussed checking fasting readings as well as 2-hour postprandial readings.  Discussed diet.  Suggest signing up for diabetic education classes.  Discussed tobacco cessation including nicotine patches,  Wellbutrin, or Chantix when you are ready to quit.    Return in about 3 months (around 11/14/2019).

## 2019-09-17 RX ORDER — BLOOD SUGAR DIAGNOSTIC
STRIP MISCELLANEOUS
Qty: 100 EACH | Refills: 11 | Status: SHIPPED | OUTPATIENT
Start: 2019-09-17 | End: 2020-11-16

## 2019-10-15 RX ORDER — IRBESARTAN 300 MG/1
300 TABLET ORAL NIGHTLY
Qty: 90 TABLET | Refills: 0 | Status: SHIPPED | OUTPATIENT
Start: 2019-10-15 | End: 2020-01-20

## 2019-10-15 RX ORDER — LOVASTATIN 20 MG/1
TABLET ORAL
Qty: 90 TABLET | Refills: 0 | Status: SHIPPED | OUTPATIENT
Start: 2019-10-15 | End: 2020-01-07

## 2019-11-08 RX ORDER — GABAPENTIN 300 MG/1
CAPSULE ORAL
Qty: 90 CAPSULE | Refills: 0 | Status: CANCELLED | OUTPATIENT
Start: 2019-11-08

## 2019-11-11 RX ORDER — GABAPENTIN 300 MG/1
300 CAPSULE ORAL 3 TIMES DAILY
Qty: 90 CAPSULE | Refills: 0 | Status: SHIPPED | OUTPATIENT
Start: 2019-11-11 | End: 2020-08-19

## 2019-11-20 DIAGNOSIS — E78.2 MIXED HYPERLIPIDEMIA: ICD-10-CM

## 2019-11-20 DIAGNOSIS — E11.40 TYPE 2 DIABETES MELLITUS WITH DIABETIC NEUROPATHY, WITHOUT LONG-TERM CURRENT USE OF INSULIN (HCC): ICD-10-CM

## 2019-11-20 DIAGNOSIS — I10 BENIGN ESSENTIAL HYPERTENSION: Primary | ICD-10-CM

## 2019-11-22 LAB
ALBUMIN SERPL-MCNC: 5 G/DL (ref 3.5–5.2)
ALBUMIN/CREAT UR: 322 MG/G CREAT (ref 0–30)
ALBUMIN/GLOB SERPL: 2.6 G/DL
ALP SERPL-CCNC: 77 U/L (ref 39–117)
ALT SERPL-CCNC: 13 U/L (ref 1–41)
AST SERPL-CCNC: 12 U/L (ref 1–40)
BASOPHILS # BLD AUTO: 0.08 10*3/MM3 (ref 0–0.2)
BASOPHILS NFR BLD AUTO: 0.8 % (ref 0–1.5)
BILIRUB SERPL-MCNC: 0.8 MG/DL (ref 0.2–1.2)
BUN SERPL-MCNC: 14 MG/DL (ref 8–23)
BUN/CREAT SERPL: 17.5 (ref 7–25)
CALCIUM SERPL-MCNC: 9.7 MG/DL (ref 8.6–10.5)
CHLORIDE SERPL-SCNC: 99 MMOL/L (ref 98–107)
CHOLEST SERPL-MCNC: 146 MG/DL (ref 0–200)
CO2 SERPL-SCNC: 28.6 MMOL/L (ref 22–29)
CREAT SERPL-MCNC: 0.8 MG/DL (ref 0.76–1.27)
CREAT UR-MCNC: 41.8 MG/DL
EOSINOPHIL # BLD AUTO: 0.38 10*3/MM3 (ref 0–0.4)
EOSINOPHIL NFR BLD AUTO: 3.8 % (ref 0.3–6.2)
ERYTHROCYTE [DISTWIDTH] IN BLOOD BY AUTOMATED COUNT: 12.6 % (ref 12.3–15.4)
GLOBULIN SER CALC-MCNC: 1.9 GM/DL
GLUCOSE SERPL-MCNC: 152 MG/DL (ref 65–99)
HBA1C MFR BLD: 7.8 % (ref 4.8–5.6)
HCT VFR BLD AUTO: 56.7 % (ref 37.5–51)
HDLC SERPL-MCNC: 34 MG/DL (ref 40–60)
HGB BLD-MCNC: 19.5 G/DL (ref 13–17.7)
IMM GRANULOCYTES # BLD AUTO: 0.06 10*3/MM3 (ref 0–0.05)
IMM GRANULOCYTES NFR BLD AUTO: 0.6 % (ref 0–0.5)
LDLC SERPL CALC-MCNC: 71 MG/DL (ref 0–100)
LDLC/HDLC SERPL: 2.08 {RATIO}
LYMPHOCYTES # BLD AUTO: 1.94 10*3/MM3 (ref 0.7–3.1)
LYMPHOCYTES NFR BLD AUTO: 19.3 % (ref 19.6–45.3)
MCH RBC QN AUTO: 33.4 PG (ref 26.6–33)
MCHC RBC AUTO-ENTMCNC: 34.4 G/DL (ref 31.5–35.7)
MCV RBC AUTO: 97.3 FL (ref 79–97)
MICROALBUMIN UR-MCNC: 134.6 UG/ML
MONOCYTES # BLD AUTO: 1.05 10*3/MM3 (ref 0.1–0.9)
MONOCYTES NFR BLD AUTO: 10.5 % (ref 5–12)
NEUTROPHILS # BLD AUTO: 6.53 10*3/MM3 (ref 1.7–7)
NEUTROPHILS NFR BLD AUTO: 65 % (ref 42.7–76)
NRBC BLD AUTO-RTO: 0 /100 WBC (ref 0–0.2)
PLATELET # BLD AUTO: 185 10*3/MM3 (ref 140–450)
POTASSIUM SERPL-SCNC: 5.2 MMOL/L (ref 3.5–5.2)
PROT SERPL-MCNC: 6.9 G/DL (ref 6–8.5)
RBC # BLD AUTO: 5.83 10*6/MM3 (ref 4.14–5.8)
SODIUM SERPL-SCNC: 141 MMOL/L (ref 136–145)
TRIGL SERPL-MCNC: 207 MG/DL (ref 0–150)
VLDLC SERPL CALC-MCNC: 41.4 MG/DL
WBC # BLD AUTO: 10.04 10*3/MM3 (ref 3.4–10.8)

## 2019-11-25 ENCOUNTER — OFFICE VISIT (OUTPATIENT)
Dept: FAMILY MEDICINE CLINIC | Facility: CLINIC | Age: 74
End: 2019-11-25

## 2019-11-25 VITALS
WEIGHT: 214.2 LBS | SYSTOLIC BLOOD PRESSURE: 118 MMHG | HEART RATE: 102 BPM | HEIGHT: 72 IN | BODY MASS INDEX: 29.01 KG/M2 | TEMPERATURE: 97.7 F | OXYGEN SATURATION: 98 % | DIASTOLIC BLOOD PRESSURE: 64 MMHG | RESPIRATION RATE: 18 BRPM

## 2019-11-25 DIAGNOSIS — E78.2 MIXED HYPERLIPIDEMIA: ICD-10-CM

## 2019-11-25 DIAGNOSIS — D75.1 ERYTHROCYTOSIS: ICD-10-CM

## 2019-11-25 DIAGNOSIS — E11.40 TYPE 2 DIABETES MELLITUS WITH DIABETIC NEUROPATHY, WITHOUT LONG-TERM CURRENT USE OF INSULIN (HCC): ICD-10-CM

## 2019-11-25 DIAGNOSIS — I10 BENIGN ESSENTIAL HYPERTENSION: Primary | ICD-10-CM

## 2019-11-25 DIAGNOSIS — F17.200 TOBACCO DEPENDENCY: ICD-10-CM

## 2019-11-25 PROCEDURE — 99214 OFFICE O/P EST MOD 30 MIN: CPT | Performed by: INTERNAL MEDICINE

## 2019-11-25 NOTE — PROGRESS NOTES
Jan Paiz is a 74 y.o. male. Patient is here today for   Chief Complaint   Patient presents with   • Diabetes     HLD, HTN- FOLLOW UP LABS          Vitals:    11/25/19 1032   BP: 118/64   Pulse: 102   Resp: 18   Temp: 97.7 °F (36.5 °C)   SpO2: 98%     Body mass index is 29.04 kg/m².      The following portions of the patient's history were reviewed and updated as appropriate: allergies, current medications, past family history, past medical history, past social history, past surgical history and problem list.    Past Medical History:   Diagnosis Date   • Benign essential hypertension    • Cholelithiasis    • Colon polyp    • Diabetes mellitus (CMS/HCC)    • Fatigue    • H/O pulmonary emphysema    • History of kidney stones 2017    X2   • Hyperlipidemia    • Leukocytosis       Allergies   Allergen Reactions   • Sulfa Antibiotics       Social History     Socioeconomic History   • Marital status:      Spouse name: Alia   • Number of children: Not on file   • Years of education: College   • Highest education level: Not on file   Occupational History     Employer: RETIRED   Tobacco Use   • Smoking status: Current Every Day Smoker     Packs/day: 2.00     Types: Cigarettes   • Smokeless tobacco: Never Used   • Tobacco comment: 52+ years   Substance and Sexual Activity   • Alcohol use: Yes     Comment: occasional   • Drug use: No        Current Outpatient Medications:   •  ACCU-CHEK FASTCLIX LANCETS misc, USE TO TEST TWICE DAILY, Disp: 306 each, Rfl: 0  •  ACCU-CHEK GUIDE test strip, USE TO TEST TWICE DAILY, Disp: 100 each, Rfl: 11  •  aspirin 81 MG chewable tablet, Chew 81 mg Daily., Disp: , Rfl:   •  Coenzyme Q10 (COQ-10) 400 MG capsule, Take by mouth., Disp: , Rfl:   •  Dapagliflozin Propanediol (FARXIGA) 10 MG tablet, Take 10 mg by mouth Daily., Disp: 90 tablet, Rfl: 3  •  gabapentin (NEURONTIN) 300 MG capsule, Take 1 capsule by mouth 3 (Three) Times a Day., Disp: 90 capsule, Rfl: 0  •   irbesartan (AVAPRO) 300 MG tablet, TAKE 1 TABLET BY MOUTH EVERY NIGHT, Disp: 90 tablet, Rfl: 0  •  lovastatin (MEVACOR) 20 MG tablet, TAKE 1 TABLET BY MOUTH BEFORE DINNER EVERY DAY, Disp: 90 tablet, Rfl: 0  •  MULTIPLE VITAMINS PO, Take by mouth., Disp: , Rfl:   •  naproxen sodium (ALEVE) 220 MG tablet, Take 220 mg by mouth 2 (Two) Times a Day As Needed., Disp: , Rfl:   •  SITagliptin-metFORMIN HCl ER (JANUMET XR)  MG tablet, TAKE 2 TABLETS BY MOUTH DAILY, Disp: 180 tablet, Rfl: 0     Objective   History of Present Illness Sen is here for a blood pressure lab follow-up.  He has hypertension, hyperlipidemia, erythrocytosis secondary to smoking, type 2 diabetes mellitus, and tobacco dependency.  Since he was last seen he has started doing Accu-Chek readings which are in the 1 30-1 50 range fasting.  He has been eating healthier and has started an exercise program.  He has lost some weight.  He does monitor his blood pressure reports stable readings.  He would like to discuss tobacco cessation.  He had normal vascular screening tests recently.  He also had lung cancer screening earlier this year.    Review of Systems   Constitutional:        Weight loss 8 lbs   Respiratory: Negative.    Cardiovascular:        's/70's   Genitourinary: Negative.    Neurological: Negative.    Psychiatric/Behavioral: Negative.        Physical Exam   Constitutional: He appears well-developed and well-nourished.   Cardiovascular: Normal rate, regular rhythm and normal heart sounds.   140/70,128/70   Pulmonary/Chest: Effort normal and breath sounds normal.   Musculoskeletal: He exhibits no edema.   Psychiatric: He has a normal mood and affect. His behavior is normal. Judgment and thought content normal.   Vitals reviewed.      ASSESSMENT     Problem List Items Addressed This Visit        Cardiovascular and Mediastinum    Benign essential hypertension - Primary    Mixed hyperlipidemia       Endocrine    Diabetes mellitus  (CMS/[facility])       Hematopoietic and Hemostatic    Erythrocytosis       Other    Tobacco dependency          PLAN  Patient Instructions   Blood pressure is stable.  Fasting blood sugar is moderately elevated hemoglobin A1c has improved to 7.8.  Total and LDL cholesterol are normal.  HDL cholesterol is low and triglycerides are mildly elevated.  Microalbumin creatinine ratio does show some renal dysfunction.  Continue diet, exercise, and current medicines as well as further weight loss.  Discussed tobacco cessation.  A prescription for Chantix starter and continuation pack with refills was written.      Return in about 6 months (around 5/25/2020) for labsCBC,BMP,A1C,lipid, PSA.

## 2019-11-25 NOTE — PATIENT INSTRUCTIONS
Blood pressure is stable.  Fasting blood sugar is moderately elevated hemoglobin A1c has improved to 7.8.  Total and LDL cholesterol are normal.  HDL cholesterol is low and triglycerides are mildly elevated.  Microalbumin creatinine ratio does show some renal dysfunction.  Continue diet, exercise, and current medicines as well as further weight loss.  Discussed tobacco cessation.  A prescription for Chantix starter and continuation pack with refills was written.

## 2020-01-07 ENCOUNTER — OFFICE VISIT (OUTPATIENT)
Dept: FAMILY MEDICINE CLINIC | Facility: CLINIC | Age: 75
End: 2020-01-07

## 2020-01-07 VITALS
BODY MASS INDEX: 28.44 KG/M2 | SYSTOLIC BLOOD PRESSURE: 118 MMHG | WEIGHT: 210 LBS | HEART RATE: 100 BPM | HEIGHT: 72 IN | OXYGEN SATURATION: 98 % | TEMPERATURE: 98.1 F | DIASTOLIC BLOOD PRESSURE: 74 MMHG | RESPIRATION RATE: 16 BRPM

## 2020-01-07 DIAGNOSIS — Z71.6 ENCOUNTER FOR SMOKING CESSATION COUNSELING: ICD-10-CM

## 2020-01-07 DIAGNOSIS — R05.9 COUGH: ICD-10-CM

## 2020-01-07 DIAGNOSIS — Z09 HOSPITAL DISCHARGE FOLLOW-UP: Primary | ICD-10-CM

## 2020-01-07 PROBLEM — J96.01 ACUTE RESPIRATORY FAILURE WITH HYPOXIA: Status: ACTIVE | Noted: 2019-12-12

## 2020-01-07 PROBLEM — Z72.0 TOBACCO ABUSE: Status: ACTIVE | Noted: 2019-12-12

## 2020-01-07 PROBLEM — J18.9 COMMUNITY ACQUIRED PNEUMONIA OF LEFT LOWER LOBE OF LUNG: Status: ACTIVE | Noted: 2019-12-14

## 2020-01-07 PROBLEM — J10.1 INFLUENZA A: Status: ACTIVE | Noted: 2019-12-12

## 2020-01-07 PROBLEM — A41.9 SEVERE SEPSIS: Status: ACTIVE | Noted: 2019-12-12

## 2020-01-07 PROBLEM — R65.20 SEVERE SEPSIS (HCC): Status: ACTIVE | Noted: 2019-12-12

## 2020-01-07 PROCEDURE — 99213 OFFICE O/P EST LOW 20 MIN: CPT | Performed by: NURSE PRACTITIONER

## 2020-01-07 RX ORDER — ALBUTEROL SULFATE 90 UG/1
2 AEROSOL, METERED RESPIRATORY (INHALATION) EVERY 4 HOURS PRN
Qty: 1 INHALER | Refills: 0 | Status: SHIPPED | OUTPATIENT
Start: 2020-01-07 | End: 2021-09-29

## 2020-01-07 RX ORDER — LOVASTATIN 20 MG/1
TABLET ORAL
Qty: 90 TABLET | Refills: 0 | Status: SHIPPED | OUTPATIENT
Start: 2020-01-07 | End: 2020-04-10

## 2020-01-07 NOTE — PATIENT INSTRUCTIONS
Discussed smoking cessation--need to keep using nicotine patches, discussed using fidget spinners, stress ball or something to help with occupying of hands, discussed improvements on lungs and health    Discussed needing to get the pneumococcal 23 vaccine in about 2 weeks if cough resolved.     Discussed call/rto if having any further issues/concerns.    Pt verb. Understanding.

## 2020-01-07 NOTE — PROGRESS NOTES
Jan Paiz is a 74 y.o. male. Patient is here today for hospital follow up from 12/12/19    Chief Complaint   Patient presents with   • Pneumonia     Pt is here to f/u from hospital visit    • Influenza       (Not on file)-  Risk for Readmission (LACE) No data recorded         Vitals:    01/07/20 1417   BP: 118/74   Pulse: 100   Resp: 16   Temp: 98.1 °F (36.7 °C)   SpO2: 98%     The following portions of the patient's history were reviewed and updated as appropriate: allergies, current medications, past family history, past medical history, past social history, past surgical history and problem list.    Past Medical History:   Diagnosis Date   • Benign essential hypertension    • Cholelithiasis    • Colon polyp    • Diabetes mellitus (CMS/HCC)    • Fatigue    • H/O pulmonary emphysema    • History of kidney stones 2017    X2   • Hyperlipidemia    • Leukocytosis       Allergies   Allergen Reactions   • Sulfa Antibiotics Hives     Unknown type of reaction per pt      Social History     Socioeconomic History   • Marital status:      Spouse name: Alia   • Number of children: Not on file   • Years of education: College   • Highest education level: Not on file   Occupational History     Employer: RETIRED   Tobacco Use   • Smoking status: Current Every Day Smoker     Packs/day: 2.00     Types: Cigarettes   • Smokeless tobacco: Never Used   • Tobacco comment: 52+ years   Substance and Sexual Activity   • Alcohol use: Yes     Comment: occasional   • Drug use: No        Current Outpatient Medications:   •  ACCU-CHEK FASTCLIX LANCETS misc, USE TO TEST TWICE DAILY, Disp: 306 each, Rfl: 0  •  ACCU-CHEK GUIDE test strip, USE TO TEST TWICE DAILY, Disp: 100 each, Rfl: 11  •  albuterol sulfate  (90 Base) MCG/ACT inhaler, Inhale 2 puffs Every 4 (Four) Hours As Needed for Wheezing or Shortness of Air (coughing episodes)., Disp: 1 inhaler, Rfl: 0  •  aspirin 81 MG chewable tablet, Chew 81 mg Daily.,  Disp: , Rfl:   •  Coenzyme Q10 (COQ-10) 400 MG capsule, Take by mouth., Disp: , Rfl:   •  Dapagliflozin Propanediol (FARXIGA) 10 MG tablet, Take 10 mg by mouth Daily., Disp: 90 tablet, Rfl: 3  •  gabapentin (NEURONTIN) 300 MG capsule, Take 1 capsule by mouth 3 (Three) Times a Day., Disp: 90 capsule, Rfl: 0  •  irbesartan (AVAPRO) 300 MG tablet, TAKE 1 TABLET BY MOUTH EVERY NIGHT, Disp: 90 tablet, Rfl: 0  •  lovastatin (MEVACOR) 20 MG tablet, TAKE 1 TABLET BY MOUTH BEFORE DINNER EVERY DAY, Disp: 90 tablet, Rfl: 0  •  MULTIPLE VITAMINS PO, Take by mouth., Disp: , Rfl:   •  naproxen sodium (ALEVE) 220 MG tablet, Take 220 mg by mouth 2 (Two) Times a Day As Needed., Disp: , Rfl:   •  sitaGLIPtin-metFORMIN (JANUMET)  MG per tablet, Take 2 tablets by mouth Daily., Disp: , Rfl:   •  SITagliptin-metFORMIN HCl ER (JANUMET XR)  MG tablet, TAKE 2 TABLETS BY MOUTH DAILY, Disp: 180 tablet, Rfl: 0     Objective     Pt here today for f/u from hosp admit to [facility] from 12/12- 12/14/19. Pt stating he passed out in his bathroom at 6 am on 12/12/19, his wife found him and called EMS. pt taken to ER and admitted for Pneumonia, sepsis, respiratory failure and Influ. Pt d/c'd home with azithromycin and tamiflu. Pt stating he stopped smoking while in hospital and has nicotine patches now. Pt stating he is going to try to stay off smoking. Pt stating he still has a cough, not as bad as it was but still there.       Review of Systems   Constitutional: Positive for fatigue. Negative for fever.   HENT: Positive for rhinorrhea. Negative for congestion, ear pain and sore throat.    Respiratory: Positive for cough (improved from previous, non-productive mostly) and shortness of breath (when first coming out of hospital, now not so much).    Gastrointestinal: Negative for diarrhea, nausea and vomiting.   Neurological: Negative for headaches.       Physical Exam   Constitutional: He is oriented to person, place, and time.  He appears well-developed and well-nourished.   HENT:   Head: Normocephalic and atraumatic.   Right Ear: Tympanic membrane normal. Tympanic membrane is not erythematous.   Left Ear: Tympanic membrane normal. Tympanic membrane is not erythematous.   Mouth/Throat: Oropharynx is clear and moist.   Eyes: Pupils are equal, round, and reactive to light. Conjunctivae are normal.   Cardiovascular: Normal rate and regular rhythm.   No murmur heard.  Pulmonary/Chest: Effort normal. No accessory muscle usage or stridor. No respiratory distress. He has decreased breath sounds (clear/diminished at bases). He has no wheezes. He has no rhonchi. He has no rales.   Musculoskeletal: Normal range of motion.   Lymphadenopathy:     He has no cervical adenopathy.   Neurological: He is alert and oriented to person, place, and time.   Skin: Skin is warm and dry.   Vitals reviewed.      ASSESSMENT     Problem List Items Addressed This Visit     None      Visit Diagnoses     Hospital discharge follow-up    -  Primary    Cough        Relevant Medications    albuterol sulfate  (90 Base) MCG/ACT inhaler    Encounter for smoking cessation counseling              Current outpatient and discharge medications have been reconciled for the patient.  Reviewed by: CHRISTINA Franz      PLAN    Patient Instructions   Discussed smoking cessation--need to keep using nicotine patches, discussed using fidget spinners, stress ball or something to help with occupying of hands, discussed improvements on lungs and health    Discussed needing to get the pneumococcal 23 vaccine in about 2 weeks if cough resolved.     Discussed call/rto if having any further issues/concerns.    Pt verb. Understanding.    Return for follow up with Dr. Coronado as needed/as recommended.

## 2020-01-20 RX ORDER — IRBESARTAN 300 MG/1
300 TABLET ORAL NIGHTLY
Qty: 90 TABLET | Refills: 0 | Status: SHIPPED | OUTPATIENT
Start: 2020-01-20 | End: 2020-04-16

## 2020-03-10 ENCOUNTER — TELEPHONE (OUTPATIENT)
Dept: FAMILY MEDICINE CLINIC | Facility: CLINIC | Age: 75
End: 2020-03-10

## 2020-04-10 RX ORDER — LOVASTATIN 20 MG/1
TABLET ORAL
Qty: 90 TABLET | Refills: 0 | Status: SHIPPED | OUTPATIENT
Start: 2020-04-10 | End: 2020-07-06

## 2020-04-16 RX ORDER — IRBESARTAN 300 MG/1
300 TABLET ORAL NIGHTLY
Qty: 90 TABLET | Refills: 0 | Status: SHIPPED | OUTPATIENT
Start: 2020-04-16 | End: 2020-07-13

## 2020-05-11 DIAGNOSIS — E11.40 TYPE 2 DIABETES MELLITUS WITH DIABETIC NEUROPATHY, WITHOUT LONG-TERM CURRENT USE OF INSULIN (HCC): ICD-10-CM

## 2020-05-11 DIAGNOSIS — E78.2 MIXED HYPERLIPIDEMIA: Primary | ICD-10-CM

## 2020-05-11 DIAGNOSIS — Z12.5 ENCOUNTER FOR SCREENING FOR MALIGNANT NEOPLASM OF PROSTATE: ICD-10-CM

## 2020-05-11 DIAGNOSIS — I10 BENIGN ESSENTIAL HYPERTENSION: ICD-10-CM

## 2020-05-13 LAB
BASOPHILS # BLD AUTO: 0.07 10*3/MM3 (ref 0–0.2)
BASOPHILS NFR BLD AUTO: 0.9 % (ref 0–1.5)
BUN SERPL-MCNC: 24 MG/DL (ref 8–23)
BUN/CREAT SERPL: 29.6 (ref 7–25)
CALCIUM SERPL-MCNC: 9.8 MG/DL (ref 8.6–10.5)
CHLORIDE SERPL-SCNC: 95 MMOL/L (ref 98–107)
CHOLEST SERPL-MCNC: 160 MG/DL (ref 0–200)
CO2 SERPL-SCNC: 29.5 MMOL/L (ref 22–29)
CREAT SERPL-MCNC: 0.81 MG/DL (ref 0.76–1.27)
EOSINOPHIL # BLD AUTO: 0.43 10*3/MM3 (ref 0–0.4)
EOSINOPHIL NFR BLD AUTO: 5.4 % (ref 0.3–6.2)
ERYTHROCYTE [DISTWIDTH] IN BLOOD BY AUTOMATED COUNT: 12.2 % (ref 12.3–15.4)
GLUCOSE SERPL-MCNC: 207 MG/DL (ref 65–99)
HBA1C MFR BLD: 7.7 % (ref 4.8–5.6)
HCT VFR BLD AUTO: 47.3 % (ref 37.5–51)
HDLC SERPL-MCNC: 32 MG/DL (ref 40–60)
HGB BLD-MCNC: 16.1 G/DL (ref 13–17.7)
IMM GRANULOCYTES # BLD AUTO: 0.07 10*3/MM3 (ref 0–0.05)
IMM GRANULOCYTES NFR BLD AUTO: 0.9 % (ref 0–0.5)
LDLC SERPL CALC-MCNC: 63 MG/DL (ref 0–100)
LDLC/HDLC SERPL: 1.98 {RATIO}
LYMPHOCYTES # BLD AUTO: 1.96 10*3/MM3 (ref 0.7–3.1)
LYMPHOCYTES NFR BLD AUTO: 24.7 % (ref 19.6–45.3)
MCH RBC QN AUTO: 32.4 PG (ref 26.6–33)
MCHC RBC AUTO-ENTMCNC: 34 G/DL (ref 31.5–35.7)
MCV RBC AUTO: 95.2 FL (ref 79–97)
MONOCYTES # BLD AUTO: 0.84 10*3/MM3 (ref 0.1–0.9)
MONOCYTES NFR BLD AUTO: 10.6 % (ref 5–12)
NEUTROPHILS # BLD AUTO: 4.56 10*3/MM3 (ref 1.7–7)
NEUTROPHILS NFR BLD AUTO: 57.5 % (ref 42.7–76)
NRBC BLD AUTO-RTO: 0 /100 WBC (ref 0–0.2)
PLATELET # BLD AUTO: 216 10*3/MM3 (ref 140–450)
POTASSIUM SERPL-SCNC: 4.4 MMOL/L (ref 3.5–5.2)
PSA SERPL-MCNC: 1.11 NG/ML (ref 0–4)
RBC # BLD AUTO: 4.97 10*6/MM3 (ref 4.14–5.8)
SODIUM SERPL-SCNC: 135 MMOL/L (ref 136–145)
TRIGL SERPL-MCNC: 324 MG/DL (ref 0–150)
VLDLC SERPL CALC-MCNC: 64.8 MG/DL (ref 5–40)
WBC # BLD AUTO: 7.93 10*3/MM3 (ref 3.4–10.8)

## 2020-05-19 ENCOUNTER — OFFICE VISIT (OUTPATIENT)
Dept: FAMILY MEDICINE CLINIC | Facility: CLINIC | Age: 75
End: 2020-05-19

## 2020-05-19 VITALS
BODY MASS INDEX: 31.64 KG/M2 | HEIGHT: 71 IN | RESPIRATION RATE: 17 BRPM | SYSTOLIC BLOOD PRESSURE: 130 MMHG | OXYGEN SATURATION: 98 % | TEMPERATURE: 97.1 F | WEIGHT: 226 LBS | HEART RATE: 104 BPM | DIASTOLIC BLOOD PRESSURE: 70 MMHG

## 2020-05-19 DIAGNOSIS — E78.2 MIXED HYPERLIPIDEMIA: ICD-10-CM

## 2020-05-19 DIAGNOSIS — E11.40 TYPE 2 DIABETES MELLITUS WITH DIABETIC NEUROPATHY, WITHOUT LONG-TERM CURRENT USE OF INSULIN (HCC): ICD-10-CM

## 2020-05-19 DIAGNOSIS — R91.8 PULMONARY NODULES: ICD-10-CM

## 2020-05-19 DIAGNOSIS — I10 BENIGN ESSENTIAL HYPERTENSION: Primary | ICD-10-CM

## 2020-05-19 DIAGNOSIS — E11.42 DIABETIC PERIPHERAL NEUROPATHY (HCC): ICD-10-CM

## 2020-05-19 PROCEDURE — 99214 OFFICE O/P EST MOD 30 MIN: CPT | Performed by: INTERNAL MEDICINE

## 2020-05-19 RX ORDER — GABAPENTIN 300 MG/1
300 CAPSULE ORAL
COMMUNITY
Start: 2019-11-11 | End: 2020-08-19

## 2020-05-19 RX ORDER — CHLORHEXIDINE GLUCONATE 213 G/1000ML
SOLUTION TOPICAL
COMMUNITY
Start: 2020-03-21 | End: 2023-02-23

## 2020-05-19 NOTE — PATIENT INSTRUCTIONS
Blood pressure is stable.  Fasting blood sugar is high and hemoglobin A1c is slightly improved at 7.7.  Total and LDL cholesterol are normal.  HDL cholesterol is low and triglycerides are high.  A complete blood count, kidney functions, and prostate-specific antigen are normal.  Discussed importance of a daily cardiovascular exercise program and a low total carbohydrate diet.  Suggest doing 2-hour postprandial Accu-Chek readings and avoiding foods that cause blood sugar spikes.  Also discussed adding another diabetic medicine such as by alvarado or Trulicity.

## 2020-05-19 NOTE — PROGRESS NOTES
Jan Paiz is a 74 y.o. male. Patient is here today for   Chief Complaint   Patient presents with   • Hypertension   • Diabetes   • Hyperlipidemia          Vitals:    05/19/20 0940   BP: 130/70   Pulse: 104   Resp: 17   Temp: 97.1 °F (36.2 °C)   SpO2: 98%     Body mass index is 31.52 kg/m².      The following portions of the patient's history were reviewed and updated as appropriate: allergies, current medications, past family history, past medical history, past social history, past surgical history and problem list.    Past Medical History:   Diagnosis Date   • Benign essential hypertension    • Cholelithiasis    • Colon polyp    • Diabetes mellitus (CMS/HCC)    • Fatigue    • H/O pulmonary emphysema    • History of kidney stones 2017    X2   • Hyperlipidemia    • Leukocytosis       Allergies   Allergen Reactions   • Sulfa Antibiotics Hives     Unknown type of reaction per pt      Social History     Socioeconomic History   • Marital status:      Spouse name: Alai   • Number of children: Not on file   • Years of education: College   • Highest education level: Not on file   Occupational History     Employer: RETIRED   Tobacco Use   • Smoking status: Current Every Day Smoker     Packs/day: 2.00     Types: Cigarettes   • Smokeless tobacco: Never Used   • Tobacco comment: 52+ years   Substance and Sexual Activity   • Alcohol use: Yes     Comment: occasional   • Drug use: No   • Sexual activity: Defer        Current Outpatient Medications:   •  gabapentin (NEURONTIN) 300 MG capsule, Take 300 mg by mouth., Disp: , Rfl:   •  mupirocin (BACTROBAN) 2 % ointment, Apply to affected area 2 times daily., Disp: , Rfl:   •  ACCU-CHEK FASTCLIX LANCETS misc, USE TO TEST TWICE DAILY, Disp: 306 each, Rfl: 0  •  ACCU-CHEK GUIDE test strip, USE TO TEST TWICE DAILY, Disp: 100 each, Rfl: 11  •  albuterol sulfate  (90 Base) MCG/ACT inhaler, Inhale 2 puffs Every 4 (Four) Hours As Needed for Wheezing or  Shortness of Air (coughing episodes)., Disp: 1 inhaler, Rfl: 0  •  aspirin 81 MG chewable tablet, Chew 81 mg Daily., Disp: , Rfl:   •  CHANTIX STARTING MONTH KIM 0.5 MG X 11 & 1 MG X 42 tablet, UTD, Disp: , Rfl:   •  Coenzyme Q10 (COQ-10) 400 MG capsule, Take by mouth., Disp: , Rfl:   •  Dapagliflozin Propanediol (FARXIGA) 10 MG tablet, Take 10 mg by mouth Daily., Disp: 90 tablet, Rfl: 3  •  gabapentin (NEURONTIN) 300 MG capsule, Take 1 capsule by mouth 3 (Three) Times a Day., Disp: 90 capsule, Rfl: 0  •  HIBICLENS 4 % external liquid, ASIF EXT AA BID, Disp: , Rfl:   •  irbesartan (AVAPRO) 300 MG tablet, TAKE 1 TABLET BY MOUTH EVERY NIGHT, Disp: 90 tablet, Rfl: 0  •  lovastatin (MEVACOR) 20 MG tablet, TAKE 1 TABLET BY MOUTH EVERY DAY BEFORE DINNER, Disp: 90 tablet, Rfl: 0  •  MULTIPLE VITAMINS PO, Take by mouth., Disp: , Rfl:   •  naproxen sodium (ALEVE) 220 MG tablet, Take 220 mg by mouth 2 (Two) Times a Day As Needed., Disp: , Rfl:   •  sitaGLIPtin-metFORMIN (JANUMET)  MG per tablet, Take 2 tablets by mouth Daily., Disp: , Rfl:   •  SITagliptin-metFORMIN HCl ER (Janumet XR)  MG tablet, TAKE 2 TABLETS BY MOUTH DAILY, Disp: 180 tablet, Rfl: 0     Objective   History of Present Illness Sen is here for blood pressure and lab follow-up.  He has hypertension, hyperlipidemia, type 2 diabetes mellitus, and COPD.  He feels well.  He avoids sugar in his diet but does eat complex carbohydrates.  Fasting Accu-Chek readings are in the high 100s.  He does not do postprandial readings.  He has not been exercising has gained weight in the last 6 months.  He had allergic reaction to Actos when it was started last year.  He also suffered a syncopal episode when he was placed on glimepiride earlier in the year and it was stopped.  He was admitted to the hospital in December and diagnosed with influenza and possible pneumonia.  He quit smoking and occasionally uses a nicotine lozenge.  He had lung cancer screening in  July 2019 and was noted to have some pulmonary nodules.  He had normal vascular screening last July.    Review of Systems   Constitutional:        12 LB WEIGHT GAIN   Respiratory: Negative for cough and shortness of breath.    Cardiovascular: Negative.    Gastrointestinal: Negative.    Genitourinary: Negative.    Neurological: Negative.    Psychiatric/Behavioral: Negative.        Physical Exam   Constitutional: He appears well-developed and well-nourished.   Cardiovascular: Normal rate, regular rhythm and normal heart sounds.   Neurological: He is alert.   Psychiatric: He has a normal mood and affect. His behavior is normal. Judgment and thought content normal.   Vitals reviewed.      ASSESSMENT     Problem List Items Addressed This Visit        Cardiovascular and Mediastinum    Benign essential hypertension - Primary    Mixed hyperlipidemia       Respiratory    Pulmonary nodules       Endocrine    Type 2 diabetes mellitus with diabetic neuropathy, without long-term current use of insulin (CMS/HCC)       Nervous and Auditory    Diabetic peripheral neuropathy (CMS/HCC)          PLAN  Patient Instructions   Blood pressure is stable.  Fasting blood sugar is high and hemoglobin A1c is slightly improved at 7.7.  Total and LDL cholesterol are normal.  HDL cholesterol is low and triglycerides are high.  A complete blood count, kidney functions, and prostate-specific antigen are normal.  Discussed importance of a daily cardiovascular exercise program and a low total carbohydrate diet.  Suggest doing 2-hour postprandial Accu-Chek readings and avoiding foods that cause blood sugar spikes.  Also discussed adding another diabetic medicine such as by alvarado or Trulicity.      Return in about 3 months (around 8/19/2020), or with A1C.

## 2020-06-05 RX ORDER — GABAPENTIN 300 MG/1
CAPSULE ORAL
Qty: 90 CAPSULE | Refills: 0 | Status: SHIPPED | OUTPATIENT
Start: 2020-06-05 | End: 2020-07-06

## 2020-07-05 DIAGNOSIS — E11.42 DIABETIC PERIPHERAL NEUROPATHY (HCC): Primary | ICD-10-CM

## 2020-07-06 RX ORDER — LOVASTATIN 20 MG/1
TABLET ORAL
Qty: 90 TABLET | Refills: 0 | Status: SHIPPED | OUTPATIENT
Start: 2020-07-06 | End: 2020-10-05

## 2020-07-06 RX ORDER — GABAPENTIN 300 MG/1
CAPSULE ORAL
Qty: 90 CAPSULE | Refills: 5 | Status: SHIPPED | OUTPATIENT
Start: 2020-07-06 | End: 2021-01-04

## 2020-07-13 RX ORDER — IRBESARTAN 300 MG/1
300 TABLET ORAL NIGHTLY
Qty: 90 TABLET | Refills: 0 | Status: SHIPPED | OUTPATIENT
Start: 2020-07-13 | End: 2020-10-08

## 2020-08-19 ENCOUNTER — OFFICE VISIT (OUTPATIENT)
Dept: FAMILY MEDICINE CLINIC | Facility: CLINIC | Age: 75
End: 2020-08-19

## 2020-08-19 VITALS
HEIGHT: 71 IN | RESPIRATION RATE: 18 BRPM | DIASTOLIC BLOOD PRESSURE: 70 MMHG | HEART RATE: 98 BPM | WEIGHT: 229 LBS | OXYGEN SATURATION: 97 % | BODY MASS INDEX: 32.06 KG/M2 | SYSTOLIC BLOOD PRESSURE: 110 MMHG | TEMPERATURE: 97.1 F

## 2020-08-19 DIAGNOSIS — Z12.2 ENCOUNTER FOR SCREENING FOR LUNG CANCER: ICD-10-CM

## 2020-08-19 DIAGNOSIS — R06.02 SHORTNESS OF BREATH: ICD-10-CM

## 2020-08-19 DIAGNOSIS — R91.8 PULMONARY NODULES: ICD-10-CM

## 2020-08-19 DIAGNOSIS — J43.9 PULMONARY EMPHYSEMA, UNSPECIFIED EMPHYSEMA TYPE (HCC): ICD-10-CM

## 2020-08-19 DIAGNOSIS — E11.40 TYPE 2 DIABETES MELLITUS WITH DIABETIC NEUROPATHY, WITHOUT LONG-TERM CURRENT USE OF INSULIN (HCC): ICD-10-CM

## 2020-08-19 DIAGNOSIS — Z87.891 PERSONAL HISTORY OF NICOTINE DEPENDENCE: ICD-10-CM

## 2020-08-19 DIAGNOSIS — I10 BENIGN ESSENTIAL HYPERTENSION: Primary | ICD-10-CM

## 2020-08-19 LAB
EXPIRATION DATE: ABNORMAL
HBA1C MFR BLD: 9.4 %
Lab: 574

## 2020-08-19 PROCEDURE — 36416 COLLJ CAPILLARY BLOOD SPEC: CPT | Performed by: INTERNAL MEDICINE

## 2020-08-19 PROCEDURE — 99214 OFFICE O/P EST MOD 30 MIN: CPT | Performed by: INTERNAL MEDICINE

## 2020-08-19 PROCEDURE — 83036 HEMOGLOBIN GLYCOSYLATED A1C: CPT | Performed by: INTERNAL MEDICINE

## 2020-08-19 NOTE — PATIENT INSTRUCTIONS
Blood pressure is normal today.  Hemoglobin A1c is increased to 9.4 up from 7.7.  Discussed diet, exercise, and weight loss as well as importance of diabetic education.  Also discussed adding another diabetic medicine.  Pulmonary function testing today shows severe restriction.  Will refer to pulmonary medicine and also set up lung cancer screening again.

## 2020-08-19 NOTE — PROGRESS NOTES
aJn Paiz is a 75 y.o. male. Patient is here today for   Chief Complaint   Patient presents with   • Diabetes          Vitals:    08/19/20 0858   BP: 110/70   Pulse: 98   Resp: 18   Temp: 97.1 °F (36.2 °C)   SpO2: 97%     Body mass index is 31.94 kg/m².    The following portions of the patient's history were reviewed and updated as appropriate: allergies, current medications, past family history, past medical history, past social history, past surgical history and problem list.    Past Medical History:   Diagnosis Date   • Benign essential hypertension    • Cholelithiasis    • Colon polyp    • Diabetes mellitus (CMS/HCC)    • Fatigue    • H/O pulmonary emphysema    • History of kidney stones 2017    X2   • Hyperlipidemia    • Leukocytosis       Allergies   Allergen Reactions   • Sulfa Antibiotics Hives     Unknown type of reaction per pt      Social History     Socioeconomic History   • Marital status:      Spouse name: Alia   • Number of children: Not on file   • Years of education: College   • Highest education level: Not on file   Occupational History     Employer: RETIRED   Tobacco Use   • Smoking status: Current Every Day Smoker     Packs/day: 2.00     Types: Cigarettes   • Smokeless tobacco: Never Used   • Tobacco comment: 52+ years   Substance and Sexual Activity   • Alcohol use: Yes     Comment: occasional   • Drug use: No   • Sexual activity: Defer        Current Outpatient Medications:   •  ACCU-CHEK FASTCLIX LANCETS misc, USE TO TEST TWICE DAILY, Disp: 306 each, Rfl: 0  •  ACCU-CHEK GUIDE test strip, USE TO TEST TWICE DAILY, Disp: 100 each, Rfl: 11  •  albuterol sulfate  (90 Base) MCG/ACT inhaler, Inhale 2 puffs Every 4 (Four) Hours As Needed for Wheezing or Shortness of Air (coughing episodes)., Disp: 1 inhaler, Rfl: 0  •  aspirin 81 MG chewable tablet, Chew 81 mg Daily., Disp: , Rfl:   •  Coenzyme Q10 (COQ-10) 400 MG capsule, Take by mouth., Disp: , Rfl:   •   Dapagliflozin Propanediol (FARXIGA) 10 MG tablet, Take 10 mg by mouth Daily., Disp: 90 tablet, Rfl: 3  •  gabapentin (NEURONTIN) 300 MG capsule, TAKE 1 CAPSULE BY MOUTH THREE TIMES DAILY, Disp: 90 capsule, Rfl: 5  •  HIBICLENS 4 % external liquid, ASIF EXT AA BID, Disp: , Rfl:   •  irbesartan (AVAPRO) 300 MG tablet, TAKE 1 TABLET BY MOUTH EVERY NIGHT, Disp: 90 tablet, Rfl: 0  •  lovastatin (MEVACOR) 20 MG tablet, TAKE 1 TABLET BY MOUTH EVERY DAY BEFORE DINNER, Disp: 90 tablet, Rfl: 0  •  MULTIPLE VITAMINS PO, Take by mouth., Disp: , Rfl:   •  mupirocin (BACTROBAN) 2 % ointment, Apply to affected area 2 times daily., Disp: , Rfl:   •  naproxen sodium (ALEVE) 220 MG tablet, Take 220 mg by mouth 2 (Two) Times a Day As Needed., Disp: , Rfl:   •  SITagliptin-metFORMIN HCl ER (Janumet XR)  MG tablet, TAKE 2 TABLETS BY MOUTH DAILY, Disp: 180 tablet, Rfl: 0     Objective     History of Present Illness Sen is here for blood pressure check and an A1c as he has hypertension, hyperlipidemia, neuropathy, and type 2 diabetes mellitus.  He does watch sugars and complex carbohydrates.  Accu-Chek readings are around 200 after he eats complex carbs.  Hemoglobin A1c 3 months ago was 7.7.  He has been going to the gym.  His weight is up 3 pounds from May.  Today he also complains of intermittent shortness of breath.  He denies any dyspnea on exertion.  He does complain of increased mucus at times.  He smoked for 55 years and quit.  Lung cancer screening last July showed some pulmonary nodules and elevated left hemidiaphragm.    Review of Systems   Constitutional: Negative for activity change and fatigue.   Respiratory: Positive for shortness of breath.    Cardiovascular: Negative.    Genitourinary: Negative.    Neurological: Positive for numbness.   Psychiatric/Behavioral: Negative.        Physical Exam   Constitutional: He appears well-developed and well-nourished.   Cardiovascular: Normal rate, regular rhythm and normal  heart sounds.   Pulmonary/Chest: Effort normal and breath sounds normal.   Musculoskeletal: He exhibits no edema.   Neurological: He is alert.   Psychiatric: He has a normal mood and affect. His behavior is normal. Judgment and thought content normal.   Vitals reviewed.      ASSESSMENT     Problem List Items Addressed This Visit        Cardiovascular and Mediastinum    Benign essential hypertension - Primary       Respiratory    Pulmonary nodules    Shortness of breath    Relevant Orders    Breathing Capacity Test    Ambulatory Referral to Pulmonology    Pulmonary emphysema (CMS/formerly [facility])    Relevant Orders    Ambulatory Referral to Pulmonology       Endocrine    Type 2 diabetes mellitus with diabetic neuropathy, without long-term current use of insulin (CMS/formerly [facility])    Relevant Orders    POCT glycated hemoglobin, total (Completed)      Other Visit Diagnoses     Encounter for screening for lung cancer        Relevant Orders    CT Chest Low Dose Wo    Personal history of nicotine dependence         Relevant Orders    CT Chest Low Dose Wo          PLAN  Patient Instructions   Blood pressure is normal today.  Hemoglobin A1c is increased to 9.4 up from 7.7.  Discussed diet, exercise, and weight loss as well as importance of diabetic education.  Also discussed adding another diabetic medicine.  Pulmonary function testing today shows severe restriction.  Will refer to pulmonary medicine and also set up lung cancer screening again.    Return in about 3 months (around 11/19/2020) for labs CMP, lipid, A1c, TSH, albumin creatinine ratio..

## 2020-08-28 ENCOUNTER — TELEPHONE (OUTPATIENT)
Dept: FAMILY MEDICINE CLINIC | Facility: CLINIC | Age: 75
End: 2020-08-28

## 2020-08-28 NOTE — TELEPHONE ENCOUNTER
Patients wife, Alia, calling to check the status of patients referral to a pulmonologist. She states he was to have COPD testing and a C Scan performed.    Please call Alia at [phone].

## 2020-08-31 NOTE — TELEPHONE ENCOUNTER
SPOKE TO PATIENTS WIFE MARICEL AND SHE IS AWARE THAT PT'S RECORDS HAVE BEEN FAXED TO [facility] AND I ALSO GAVE HER THERE PHONE #. PT'S WIFE WANTS TO HOLD OFF ON DOING THE CT CHEST LOW DOSE WO UNTIL PT SEE'S THE PULMONARY DOCTOR PLUS PT IS HAVING SYMPTOMS SO HE CAN'T DO A CT CHEST LOW DOSE SCREENING.

## 2020-09-24 RX ORDER — LANCETS
EACH MISCELLANEOUS
Qty: 306 EACH | Refills: 0 | Status: SHIPPED | OUTPATIENT
Start: 2020-09-24 | End: 2020-12-14

## 2020-10-04 RX ORDER — DAPAGLIFLOZIN 10 MG/1
TABLET, FILM COATED ORAL
Qty: 90 TABLET | Refills: 3 | Status: CANCELLED | OUTPATIENT
Start: 2020-10-04

## 2020-10-05 RX ORDER — EMPAGLIFLOZIN 25 MG/1
25 TABLET, FILM COATED ORAL DAILY
Qty: 90 TABLET | Refills: 3 | Status: SHIPPED | OUTPATIENT
Start: 2020-10-05 | End: 2020-11-20

## 2020-10-05 RX ORDER — LOVASTATIN 20 MG/1
TABLET ORAL
Qty: 90 TABLET | Refills: 0 | Status: SHIPPED | OUTPATIENT
Start: 2020-10-05 | End: 2021-01-21

## 2020-10-08 ENCOUNTER — TELEPHONE (OUTPATIENT)
Dept: FAMILY MEDICINE CLINIC | Facility: CLINIC | Age: 75
End: 2020-10-08

## 2020-10-08 RX ORDER — IRBESARTAN 300 MG/1
300 TABLET ORAL NIGHTLY
Qty: 90 TABLET | Refills: 0 | Status: SHIPPED | OUTPATIENT
Start: 2020-10-08 | End: 2021-01-21

## 2020-10-08 RX ORDER — SITAGLIPTIN AND METFORMIN HYDROCHLORIDE 1000; 50 MG/1; MG/1
TABLET, FILM COATED, EXTENDED RELEASE ORAL
Qty: 180 TABLET | Refills: 0 | Status: SHIPPED | OUTPATIENT
Start: 2020-10-08 | End: 2021-01-08

## 2020-10-08 NOTE — TELEPHONE ENCOUNTER
MR POP SAYS THAT PHARMACY CALLED TO NOTIFY HIM THAT HIS JARDIANCE RX WAS READY FOR , STATES HE HAS NEVER TAKEN THIS MEDICATION.     HE WOULD LIKE TO KNOW WHY HE IS NOW TAKING JARDIANCE AND IF HE SHOULD CONTINUE TAKING FARXIGA, OR TAKE JARDIANCE, AND FARXIGA TOGETHER.   PLEASE CONTACT      Elvin Paizifton DARIN [phone]     Empagliflozin (Jardiance) 25 MG tablet  25 mg, Daily 3 ordered         Summary: Take 25 mg by mouth Daily., Starting Mon 10/5/2020, Normal

## 2020-11-02 ENCOUNTER — TRANSCRIBE ORDERS (OUTPATIENT)
Dept: ADMINISTRATIVE | Facility: HOSPITAL | Age: 75
End: 2020-11-02

## 2020-11-02 DIAGNOSIS — R06.00 DYSPNEA, UNSPECIFIED TYPE: Primary | ICD-10-CM

## 2020-11-09 ENCOUNTER — APPOINTMENT (OUTPATIENT)
Dept: CT IMAGING | Facility: HOSPITAL | Age: 75
End: 2020-11-09

## 2020-11-12 DIAGNOSIS — E11.40 TYPE 2 DIABETES MELLITUS WITH DIABETIC NEUROPATHY, WITHOUT LONG-TERM CURRENT USE OF INSULIN (HCC): ICD-10-CM

## 2020-11-12 DIAGNOSIS — I10 BENIGN ESSENTIAL HYPERTENSION: Primary | ICD-10-CM

## 2020-11-12 DIAGNOSIS — E78.2 MIXED HYPERLIPIDEMIA: ICD-10-CM

## 2020-11-16 RX ORDER — BLOOD SUGAR DIAGNOSTIC
STRIP MISCELLANEOUS
Qty: 100 EACH | Refills: 5 | Status: SHIPPED | OUTPATIENT
Start: 2020-11-16 | End: 2022-02-22 | Stop reason: SDUPTHER

## 2020-11-17 LAB
ALBUMIN/CREAT UR: 118 MG/G CREAT (ref 0–29)
BASOPHILS # BLD AUTO: 0.08 10*3/MM3 (ref 0–0.2)
BASOPHILS NFR BLD AUTO: 0.9 % (ref 0–1.5)
CHOLEST SERPL-MCNC: 188 MG/DL (ref 0–200)
CREAT UR-MCNC: 76.9 MG/DL
EOSINOPHIL # BLD AUTO: 0.35 10*3/MM3 (ref 0–0.4)
EOSINOPHIL NFR BLD AUTO: 3.8 % (ref 0.3–6.2)
ERYTHROCYTE [DISTWIDTH] IN BLOOD BY AUTOMATED COUNT: 12.3 % (ref 12.3–15.4)
HBA1C MFR BLD: 8.8 % (ref 4.8–5.6)
HCT VFR BLD AUTO: 49.4 % (ref 37.5–51)
HDLC SERPL-MCNC: 36 MG/DL (ref 40–60)
HGB BLD-MCNC: 17.3 G/DL (ref 13–17.7)
IMM GRANULOCYTES # BLD AUTO: 0.05 10*3/MM3 (ref 0–0.05)
IMM GRANULOCYTES NFR BLD AUTO: 0.5 % (ref 0–0.5)
LDLC SERPL CALC-MCNC: 73 MG/DL (ref 0–100)
LDLC/HDLC SERPL: 1.42 {RATIO}
LYMPHOCYTES # BLD AUTO: 1.98 10*3/MM3 (ref 0.7–3.1)
LYMPHOCYTES NFR BLD AUTO: 21.8 % (ref 19.6–45.3)
MCH RBC QN AUTO: 33 PG (ref 26.6–33)
MCHC RBC AUTO-ENTMCNC: 35 G/DL (ref 31.5–35.7)
MCV RBC AUTO: 94.3 FL (ref 79–97)
MICROALBUMIN UR-MCNC: 91.1 UG/ML
MONOCYTES # BLD AUTO: 1.03 10*3/MM3 (ref 0.1–0.9)
MONOCYTES NFR BLD AUTO: 11.3 % (ref 5–12)
NEUTROPHILS # BLD AUTO: 5.61 10*3/MM3 (ref 1.7–7)
NEUTROPHILS NFR BLD AUTO: 61.7 % (ref 42.7–76)
NRBC BLD AUTO-RTO: 0 /100 WBC (ref 0–0.2)
PLATELET # BLD AUTO: 188 10*3/MM3 (ref 140–450)
RBC # BLD AUTO: 5.24 10*6/MM3 (ref 4.14–5.8)
TRIGL SERPL-MCNC: 504 MG/DL (ref 0–150)
TSH SERPL DL<=0.005 MIU/L-ACNC: 2.33 UIU/ML (ref 0.27–4.2)
VLDLC SERPL CALC-MCNC: 79 MG/DL (ref 5–40)
WBC # BLD AUTO: 9.1 10*3/MM3 (ref 3.4–10.8)

## 2020-11-20 ENCOUNTER — OFFICE VISIT (OUTPATIENT)
Dept: FAMILY MEDICINE CLINIC | Facility: CLINIC | Age: 75
End: 2020-11-20

## 2020-11-20 VITALS
BODY MASS INDEX: 30.48 KG/M2 | TEMPERATURE: 96.9 F | RESPIRATION RATE: 18 BRPM | WEIGHT: 230 LBS | OXYGEN SATURATION: 100 % | HEART RATE: 78 BPM | HEIGHT: 73 IN | DIASTOLIC BLOOD PRESSURE: 80 MMHG | SYSTOLIC BLOOD PRESSURE: 130 MMHG

## 2020-11-20 DIAGNOSIS — R91.8 PULMONARY NODULES: ICD-10-CM

## 2020-11-20 DIAGNOSIS — Z23 NEED FOR VACCINATION: ICD-10-CM

## 2020-11-20 DIAGNOSIS — E11.40 TYPE 2 DIABETES MELLITUS WITH DIABETIC NEUROPATHY, WITHOUT LONG-TERM CURRENT USE OF INSULIN (HCC): ICD-10-CM

## 2020-11-20 DIAGNOSIS — I10 BENIGN ESSENTIAL HYPERTENSION: Primary | ICD-10-CM

## 2020-11-20 DIAGNOSIS — E78.2 MIXED HYPERLIPIDEMIA: ICD-10-CM

## 2020-11-20 DIAGNOSIS — N28.9 KIDNEY LESION: ICD-10-CM

## 2020-11-20 PROCEDURE — G0009 ADMIN PNEUMOCOCCAL VACCINE: HCPCS | Performed by: INTERNAL MEDICINE

## 2020-11-20 PROCEDURE — 90732 PPSV23 VACC 2 YRS+ SUBQ/IM: CPT | Performed by: INTERNAL MEDICINE

## 2020-11-20 PROCEDURE — 99214 OFFICE O/P EST MOD 30 MIN: CPT | Performed by: INTERNAL MEDICINE

## 2020-11-20 RX ORDER — PIOGLITAZONEHYDROCHLORIDE 15 MG/1
15 TABLET ORAL DAILY
Qty: 90 TABLET | Refills: 3 | Status: SHIPPED | OUTPATIENT
Start: 2020-11-20 | End: 2021-09-29 | Stop reason: SINTOL

## 2020-11-20 NOTE — PROGRESS NOTES
Jan aPiz is a 75 y.o. male. Patient is here today for   Chief Complaint   Patient presents with   • Hypertension   • Hyperlipidemia   • Diabetes          Vitals:    11/20/20 0954   BP: 130/80   Pulse: 78   Resp: 18   Temp: 96.9 °F (36.1 °C)   SpO2: 100%     Body mass index is 30.34 kg/m².      The following portions of the patient's history were reviewed and updated as appropriate: allergies, current medications, past family history, past medical history, past social history, past surgical history and problem list.    Past Medical History:   Diagnosis Date   • Benign essential hypertension    • Cholelithiasis    • Colon polyp    • Diabetes mellitus (CMS/HCC)    • Fatigue    • H/O pulmonary emphysema    • History of kidney stones 2017    X2   • Hyperlipidemia    • Leukocytosis       Allergies   Allergen Reactions   • Sulfa Antibiotics Hives     Unknown type of reaction per pt      Social History     Socioeconomic History   • Marital status:      Spouse name: Alia   • Number of children: Not on file   • Years of education: College   • Highest education level: Not on file   Occupational History     Employer: RETIRED   Tobacco Use   • Smoking status: Current Every Day Smoker     Packs/day: 2.00     Types: Cigarettes   • Smokeless tobacco: Never Used   • Tobacco comment: 52+ years   Substance and Sexual Activity   • Alcohol use: Yes     Comment: occasional   • Drug use: No   • Sexual activity: Defer        Current Outpatient Medications:   •  Accu-Chek FastClix Lancets misc, USE TO TEST 2 TIMES A DAY, Disp: 306 each, Rfl: 0  •  Accu-Chek Guide test strip, USE TO TEST BLOOD SUGAR TWICE DAILY, Disp: 100 each, Rfl: 5  •  albuterol sulfate  (90 Base) MCG/ACT inhaler, Inhale 2 puffs Every 4 (Four) Hours As Needed for Wheezing or Shortness of Air (coughing episodes)., Disp: 1 inhaler, Rfl: 0  •  aspirin 81 MG chewable tablet, Chew 81 mg Daily., Disp: , Rfl:   •  Coenzyme Q10 (COQ-10) 400 MG  capsule, Take by mouth., Disp: , Rfl:   •  gabapentin (NEURONTIN) 300 MG capsule, TAKE 1 CAPSULE BY MOUTH THREE TIMES DAILY, Disp: 90 capsule, Rfl: 5  •  HIBICLENS 4 % external liquid, ASIF EXT AA BID, Disp: , Rfl:   •  irbesartan (AVAPRO) 300 MG tablet, TAKE 1 TABLET BY MOUTH EVERY NIGHT, Disp: 90 tablet, Rfl: 0  •  lovastatin (MEVACOR) 20 MG tablet, TAKE 1 TABLET BY MOUTH EVERY DAY BEFORE DINNER, Disp: 90 tablet, Rfl: 0  •  MULTIPLE VITAMINS PO, Take by mouth., Disp: , Rfl:   •  mupirocin (BACTROBAN) 2 % ointment, Apply to affected area 2 times daily., Disp: , Rfl:   •  naproxen sodium (ALEVE) 220 MG tablet, Take 220 mg by mouth 2 (Two) Times a Day As Needed., Disp: , Rfl:   •  SITagliptin-metFORMIN HCl ER (Janumet XR)  MG tablet, TAKE 2 TABLETS BY MOUTH DAILY, Disp: 180 tablet, Rfl: 0     Objective   History of Present Illness Sen is here for a blood pressure check and lab follow-up.  He has hypertension, hyperlipidemia, type 2 diabetes mellitus, and peripheral neuropathy.  He states that he eats healthy and exercises about 3 days a week.  He has gained weight last 6 months.  He quit smoking this past year.  He monitors his blood pressure reports stable readings.  He had normal vascular screening tests.  He is due for an eye exam.  He recently had lung cancer screening which was stable except for the presence of gallstone, coronary artery calcifications, and a cystic lesion of the kidney.  Radiology recommended CT of the abdomen and pelvis.    Review of Systems   Constitutional:        Weight gain 4 pounds   Eyes:        Due for eye exam   Respiratory: Negative for cough and shortness of breath.    Cardiovascular: Negative.    Gastrointestinal: Negative.    Genitourinary: Negative.    Neurological: Negative.    Psychiatric/Behavioral: Negative.        Physical Exam  Vitals signs reviewed.   Constitutional:       Appearance: Normal appearance.   Neck:      Vascular: No carotid bruit.   Cardiovascular:       Rate and Rhythm: Normal rate and regular rhythm.      Heart sounds: Normal heart sounds.      Comments: 105/70  Pulmonary:      Effort: Pulmonary effort is normal.      Breath sounds: Normal breath sounds.   Musculoskeletal:      Right lower leg: No edema.      Left lower leg: No edema.   Neurological:      Mental Status: He is alert.   Psychiatric:         Mood and Affect: Mood normal.         Behavior: Behavior normal.         Thought Content: Thought content normal.         Judgment: Judgment normal.         ASSESSMENT     Problems Addressed this Visit        Cardiovascular and Mediastinum    Benign essential hypertension - Primary    Mixed hyperlipidemia       Respiratory    Pulmonary nodules       Endocrine    Type 2 diabetes mellitus with diabetic neuropathy, without long-term current use of insulin (CMS/HCC)       Genitourinary    Kidney lesion      Other Visit Diagnoses     Need for vaccination        Relevant Orders    Pneumococcal Polysaccharide Vaccine 23-Valent (PPSV23) Greater Than or Equal To 1yo Subcutaneous / IM (Completed)      Diagnoses       Codes Comments    Benign essential hypertension    -  Primary ICD-10-CM: I10  ICD-9-CM: 401.1     Mixed hyperlipidemia     ICD-10-CM: E78.2  ICD-9-CM: 272.2     Type 2 diabetes mellitus with diabetic neuropathy, without long-term current use of insulin (CMS/HCC)     ICD-10-CM: E11.40  ICD-9-CM: 250.60, 357.2     Pulmonary nodules     ICD-10-CM: R91.8  ICD-9-CM: 793.19     Need for vaccination     ICD-10-CM: Z23  ICD-9-CM: V05.9     Kidney lesion     ICD-10-CM: N28.9  ICD-9-CM: 593.9           PLAN  Patient Instructions   Blood pressure is stable.  Triglycerides are high and HDL cholesterol remains low.   A1c has increased to 8.8.  Microalbumin creatinine ratio is elevated.  Complete blood count and thyroid-stimulating hormone level are normal.  Discussed diet, exercise, and weight loss.  Strongly suggest [facility] wellness HMR program.  Will add pioglitazone  15 mg to current medicine regimen and continue other medicines.  Reviewed and discussed recent chest CT.  Will obtain a CT of the abdomen and pelvis as suggested by radiology.      No follow-ups on file.

## 2020-11-20 NOTE — PATIENT INSTRUCTIONS
Blood pressure is stable.  Triglycerides are high and HDL cholesterol remains low.   A1c has increased to 8.8.  Microalbumin creatinine ratio is elevated.  Complete blood count and thyroid-stimulating hormone level are normal.  Discussed diet, exercise, and weight loss.  Strongly suggest [facility] [place] wellness HMR program.  Will add pioglitazone 15 mg to current medicine regimen and continue other medicines.  Reviewed and discussed recent chest CT.  Will obtain a CT of the abdomen and pelvis as suggested by radiology.

## 2020-11-21 LAB
BUN SERPL-MCNC: 25 MG/DL (ref 8–23)
BUN/CREAT SERPL: 25 (ref 7–25)
CALCIUM SERPL-MCNC: 9.8 MG/DL (ref 8.6–10.5)
CHLORIDE SERPL-SCNC: 96 MMOL/L (ref 98–107)
CO2 SERPL-SCNC: 25.2 MMOL/L (ref 22–29)
CREAT SERPL-MCNC: 1 MG/DL (ref 0.76–1.27)
GLUCOSE SERPL-MCNC: 211 MG/DL (ref 65–99)
Lab: NORMAL
POTASSIUM SERPL-SCNC: 5.2 MMOL/L (ref 3.5–5.2)
SODIUM SERPL-SCNC: 138 MMOL/L (ref 136–145)
WRITTEN AUTHORIZATION: NORMAL

## 2020-11-27 RX ORDER — DAPAGLIFLOZIN 10 MG/1
TABLET, FILM COATED ORAL
Qty: 90 TABLET | Refills: 3 | Status: CANCELLED | OUTPATIENT
Start: 2020-11-27

## 2020-11-30 RX ORDER — EMPAGLIFLOZIN 25 MG/1
25 TABLET, FILM COATED ORAL DAILY
Qty: 30 TABLET | Refills: 11 | Status: SHIPPED | OUTPATIENT
Start: 2020-11-30 | End: 2021-09-29 | Stop reason: SDUPTHER

## 2020-12-01 RX ORDER — DAPAGLIFLOZIN 10 MG/1
TABLET, FILM COATED ORAL
Qty: 90 TABLET | Refills: 3 | Status: SHIPPED | OUTPATIENT
Start: 2020-12-01 | End: 2021-09-29 | Stop reason: SDUPTHER

## 2020-12-14 RX ORDER — LANCETS
EACH MISCELLANEOUS
Qty: 306 EACH | Refills: 0 | Status: SHIPPED | OUTPATIENT
Start: 2020-12-14 | End: 2022-04-25 | Stop reason: SDUPTHER

## 2020-12-28 ENCOUNTER — TELEPHONE (OUTPATIENT)
Dept: FAMILY MEDICINE CLINIC | Facility: CLINIC | Age: 75
End: 2020-12-28

## 2020-12-28 NOTE — TELEPHONE ENCOUNTER
Caller: Sen Paiz    Relationship to patient: Self    Best call back number: [phone]    Patient is needing:patient stated he dropped ct scan with a note about 3 weeks ago, and would like a phone call about  What doctor thought about the results. Please call and advise.

## 2021-01-03 DIAGNOSIS — E11.42 DIABETIC PERIPHERAL NEUROPATHY (HCC): ICD-10-CM

## 2021-01-04 RX ORDER — GABAPENTIN 300 MG/1
CAPSULE ORAL
Qty: 90 CAPSULE | Refills: 5 | Status: SHIPPED | OUTPATIENT
Start: 2021-01-04 | End: 2021-07-06

## 2021-01-08 RX ORDER — SITAGLIPTIN AND METFORMIN HYDROCHLORIDE 1000; 50 MG/1; MG/1
TABLET, FILM COATED, EXTENDED RELEASE ORAL
Qty: 180 TABLET | Refills: 0 | Status: SHIPPED | OUTPATIENT
Start: 2021-01-08 | End: 2021-04-06

## 2021-01-21 RX ORDER — IRBESARTAN 300 MG/1
300 TABLET ORAL NIGHTLY
Qty: 90 TABLET | Refills: 3 | Status: SHIPPED | OUTPATIENT
Start: 2021-01-21 | End: 2021-09-29 | Stop reason: SDUPTHER

## 2021-01-21 RX ORDER — LOVASTATIN 20 MG/1
TABLET ORAL
Qty: 90 TABLET | Refills: 3 | Status: SHIPPED | OUTPATIENT
Start: 2021-01-21 | End: 2021-09-29 | Stop reason: ALTCHOICE

## 2021-03-15 ENCOUNTER — BULK ORDERING (OUTPATIENT)
Dept: CASE MANAGEMENT | Facility: OTHER | Age: 76
End: 2021-03-15

## 2021-03-15 DIAGNOSIS — Z23 IMMUNIZATION DUE: ICD-10-CM

## 2021-04-06 RX ORDER — SITAGLIPTIN AND METFORMIN HYDROCHLORIDE 1000; 50 MG/1; MG/1
TABLET, FILM COATED, EXTENDED RELEASE ORAL
Qty: 180 TABLET | Refills: 3 | Status: SHIPPED | OUTPATIENT
Start: 2021-04-06 | End: 2021-09-29 | Stop reason: ALTCHOICE

## 2021-07-06 DIAGNOSIS — E11.42 DIABETIC PERIPHERAL NEUROPATHY (HCC): ICD-10-CM

## 2021-07-06 RX ORDER — GABAPENTIN 300 MG/1
CAPSULE ORAL
Qty: 90 CAPSULE | Refills: 3 | Status: SHIPPED | OUTPATIENT
Start: 2021-07-06 | End: 2021-10-04 | Stop reason: SDUPTHER

## 2021-09-07 ENCOUNTER — TELEPHONE (OUTPATIENT)
Dept: FAMILY MEDICINE CLINIC | Facility: CLINIC | Age: 76
End: 2021-09-07

## 2021-09-15 ENCOUNTER — TELEPHONE (OUTPATIENT)
Dept: FAMILY MEDICINE CLINIC | Facility: CLINIC | Age: 76
End: 2021-09-15

## 2021-09-15 NOTE — TELEPHONE ENCOUNTER
Caller: Sen Paiz    Relationship: Self    Best call back number: [phone]     What is the best time to reach you: ANY TIME     Who are you requesting to speak with (clinical staff, provider,  specific staff member): ALBINO    What was the call regarding: PATIENT SAID HE SPOKE WITH ALBINO ABOUT SWITCHING PROVIDERS AND HASN'T HEARD ANYTHING BACK YET. PATIENT WOULD LIKE TO SPEAK WITH ALBINO ON THE STATUS OF WHAT'S GOING ON.

## 2021-09-29 ENCOUNTER — OFFICE VISIT (OUTPATIENT)
Dept: INTERNAL MEDICINE | Facility: CLINIC | Age: 76
End: 2021-09-29

## 2021-09-29 ENCOUNTER — LAB (OUTPATIENT)
Dept: LAB | Facility: HOSPITAL | Age: 76
End: 2021-09-29

## 2021-09-29 VITALS
HEIGHT: 73 IN | SYSTOLIC BLOOD PRESSURE: 134 MMHG | HEART RATE: 93 BPM | BODY MASS INDEX: 30.18 KG/M2 | WEIGHT: 227.7 LBS | DIASTOLIC BLOOD PRESSURE: 62 MMHG | OXYGEN SATURATION: 97 %

## 2021-09-29 DIAGNOSIS — E78.2 MIXED HYPERLIPIDEMIA: ICD-10-CM

## 2021-09-29 DIAGNOSIS — E11.40 TYPE 2 DIABETES MELLITUS WITH DIABETIC NEUROPATHY, WITHOUT LONG-TERM CURRENT USE OF INSULIN (HCC): ICD-10-CM

## 2021-09-29 DIAGNOSIS — I10 BENIGN ESSENTIAL HYPERTENSION: Primary | ICD-10-CM

## 2021-09-29 DIAGNOSIS — E11.42 DIABETIC PERIPHERAL NEUROPATHY (HCC): ICD-10-CM

## 2021-09-29 DIAGNOSIS — N28.1 RENAL CYST, ACQUIRED, LEFT: ICD-10-CM

## 2021-09-29 PROBLEM — Z00.00 MEDICARE ANNUAL WELLNESS VISIT, INITIAL: Status: ACTIVE | Noted: 2021-09-29

## 2021-09-29 PROCEDURE — 82570 ASSAY OF URINE CREATININE: CPT | Performed by: FAMILY MEDICINE

## 2021-09-29 PROCEDURE — 80061 LIPID PANEL: CPT | Performed by: FAMILY MEDICINE

## 2021-09-29 PROCEDURE — 80053 COMPREHEN METABOLIC PANEL: CPT | Performed by: FAMILY MEDICINE

## 2021-09-29 PROCEDURE — 99215 OFFICE O/P EST HI 40 MIN: CPT | Performed by: FAMILY MEDICINE

## 2021-09-29 PROCEDURE — 83036 HEMOGLOBIN GLYCOSYLATED A1C: CPT | Performed by: FAMILY MEDICINE

## 2021-09-29 PROCEDURE — 82043 UR ALBUMIN QUANTITATIVE: CPT | Performed by: FAMILY MEDICINE

## 2021-09-29 PROCEDURE — 36415 COLL VENOUS BLD VENIPUNCTURE: CPT | Performed by: FAMILY MEDICINE

## 2021-09-29 RX ORDER — NAPROXEN SOD/DIPHENHYDRAMINE 220MG-25MG
1 TABLET ORAL DAILY
COMMUNITY
End: 2023-02-23

## 2021-09-29 RX ORDER — PREGABALIN 50 MG/1
CAPSULE ORAL
Qty: 120 CAPSULE | Refills: 3 | Status: SHIPPED | OUTPATIENT
Start: 2021-09-29 | End: 2021-10-29 | Stop reason: SDUPTHER

## 2021-09-29 RX ORDER — ATORVASTATIN CALCIUM 40 MG/1
40 TABLET, FILM COATED ORAL DAILY
Qty: 90 TABLET | Refills: 3 | Status: SHIPPED | OUTPATIENT
Start: 2021-09-29 | End: 2022-09-14

## 2021-09-29 RX ORDER — IRBESARTAN 300 MG/1
300 TABLET ORAL NIGHTLY
Qty: 90 TABLET | Refills: 3 | Status: SHIPPED | OUTPATIENT
Start: 2021-09-29 | End: 2022-10-24

## 2021-09-29 RX ORDER — METFORMIN HYDROCHLORIDE 500 MG/1
1000 TABLET, EXTENDED RELEASE ORAL 2 TIMES DAILY
Qty: 360 TABLET | Refills: 3 | Status: SHIPPED | OUTPATIENT
Start: 2021-09-29 | End: 2022-04-25 | Stop reason: ALTCHOICE

## 2021-09-29 NOTE — PROGRESS NOTES
"Chief Complaint  transferring from Dr. Coronado, Diabetes, Hyperlipidemia, Hypertension, Peripheral Neuropathy, Medicare Wellness-Initial Visit, and Annual Exam    Subjective          Sen Paiz presents to [facility]  History of Present Illness  Patient new to this officet follows up for ongoing management of chronic problems :  Hypertension, hyperlipidemia, diabetes, renal cyst, neuropathy  His blood pressure is well controlled he has no chest pain shortness of breath or increased fatigue no unwanted side effects of statin therapy although his statin therapy is Mevacor  He has been not had many changes in his diabetic medications he has had some failures in the past and unwanted side effects with glimepiride and Actos  His most recent A1c was above 8 he has regular blood sugar readings greater than 200  Objective   Vital Signs:   /62 (BP Location: Left arm, Patient Position: Sitting, Cuff Size: Large Adult)   Pulse 93   Ht 185.4 cm (73\")   Wt 103 kg (227 lb 11.2 oz)   SpO2 97%   BMI 30.04 kg/m²     Physical Exam  Vitals and nursing note reviewed.   Constitutional:       Appearance: Normal appearance.   HENT:      Head: Normocephalic and atraumatic.   Eyes:      General: No scleral icterus.     Pupils: Pupils are equal, round, and reactive to light.   Cardiovascular:      Rate and Rhythm: Normal rate and regular rhythm.      Pulses: Normal pulses.      Heart sounds: Normal heart sounds.   Pulmonary:      Effort: Pulmonary effort is normal.      Breath sounds: Normal breath sounds.   Abdominal:      Tenderness: There is no right CVA tenderness or left CVA tenderness.   Musculoskeletal:      Right lower leg: No edema.      Left lower leg: No edema.   Neurological:      Mental Status: He is alert.   Psychiatric:         Mood and Affect: Mood normal.         Behavior: Behavior normal.         Thought Content: Thought content normal.         Judgment: Judgment normal.      "   Result Review :     Common labs    Common Labsle 11/16/20 11/16/20 11/16/20 11/16/20 11/16/20    0854 0854 0854 0854 0854   Glucose     211 (A)   BUN     25 (A)   Creatinine     1.00   eGFR Non  Am     73   eGFR African Am     89   Sodium     138   Potassium     5.2   Chloride     96 (A)   Calcium     9.8   WBC 9.10       Hemoglobin 17.3       Hematocrit 49.4       Platelets 188       Total Cholesterol  188      Triglycerides  504 (A)      HDL Cholesterol  36 (A)      LDL Cholesterol   73      Hemoglobin A1C   8.80 (A)     Microalbumin, Urine    91.1    (A) Abnormal value       Comments are available for some flowsheets but are not being displayed.                     Assessment and Plan    Diagnoses and all orders for this visit:    1. Benign essential hypertension (Primary)    2. Mixed hyperlipidemia    3. Type 2 diabetes mellitus with diabetic neuropathy, without long-term current use of insulin (CMS/[facility])  -     Comprehensive Metabolic Panel  -     Hemoglobin A1c  -     Microalbumin / Creatinine Urine Ratio - Urine, Clean Catch  -     Lipid Panel    4. Diabetic peripheral neuropathy (CMS/[facility])  -     pregabalin (Lyrica) 50 MG capsule; initially one every 12 hours 1 week then 1-2 every 12  hours  Dispense: 120 capsule; Refill: 3    5. Renal cyst, acquired, left  -     Ambulatory Referral to Urology    Other orders  -     atorvastatin (Lipitor) 40 MG tablet; Take 1 tablet by mouth Daily.  Dispense: 90 tablet; Refill: 3  -     Dulaglutide 0.75 MG/0.5ML solution pen-injector; Inject 0.75 mg under the skin into the appropriate area as directed 1 (One) Time Per Week.  Dispense: 4 pen; Refill: 0  -     Dulaglutide 1.5 MG/0.5ML solution pen-injector; Inject 1.5 mg under the skin into the appropriate area as directed 1 (One) Time Per Week.  Dispense: 4 pen; Refill: 9  -     metFORMIN ER (GLUCOPHAGE-XR) 500 MG 24 hr tablet; Take 2 tablets by mouth 2 (two) times a day.  Dispense: 360 tablet; Refill: 3  -      empagliflozin (Jardiance) 25 MG tablet tablet; Take 1 tablet by mouth Daily.  Dispense: 90 tablet; Refill: 3  -     irbesartan (AVAPRO) 300 MG tablet; Take 1 tablet by mouth Every Night.  Dispense: 90 tablet; Refill: 3    Tension continue irbesartan  Hyperlipidemia discontinue Mevacor start atorvastatin  Discontinue Farxiga since he is taking Jardiance  Continue Janumet  Initiate Trulicity  I spent 45 minutes caring for Sen on this date of service. This time includes time spent by me in the following activities:preparing for the visit, reviewing tests, performing a medically appropriate examination and/or evaluation , counseling and educating the patient/family/caregiver, ordering medications, tests, or procedures and documenting information in the medical record  Follow Up   Return in about 1 month (around 10/29/2021), or if symptoms worsen or fail to improve, for Recheck, Medicare Wellness, Annual physical.  Patient was given instructions and counseling regarding his condition or for health maintenance advice. Please see specific information pulled into the AVS if appropriate.

## 2021-09-30 LAB
ALBUMIN SERPL-MCNC: 4.8 G/DL (ref 3.5–5.2)
ALBUMIN UR-MCNC: 10.8 MG/DL
ALBUMIN/GLOB SERPL: 2.2 G/DL
ALP SERPL-CCNC: 67 U/L (ref 39–117)
ALT SERPL W P-5'-P-CCNC: 24 U/L (ref 1–41)
ANION GAP SERPL CALCULATED.3IONS-SCNC: 14.1 MMOL/L (ref 5–15)
AST SERPL-CCNC: 21 U/L (ref 1–40)
BILIRUB SERPL-MCNC: 0.9 MG/DL (ref 0–1.2)
BUN SERPL-MCNC: 19 MG/DL (ref 8–23)
BUN/CREAT SERPL: 28.8 (ref 7–25)
CALCIUM SPEC-SCNC: 9.9 MG/DL (ref 8.6–10.5)
CHLORIDE SERPL-SCNC: 98 MMOL/L (ref 98–107)
CHOLEST SERPL-MCNC: 170 MG/DL (ref 0–200)
CO2 SERPL-SCNC: 23.9 MMOL/L (ref 22–29)
CREAT SERPL-MCNC: 0.66 MG/DL (ref 0.76–1.27)
CREAT UR-MCNC: 37.8 MG/DL
GFR SERPL CREATININE-BSD FRML MDRD: 118 ML/MIN/1.73
GLOBULIN UR ELPH-MCNC: 2.2 GM/DL
GLUCOSE SERPL-MCNC: 229 MG/DL (ref 65–99)
HBA1C MFR BLD: 9.52 % (ref 4.8–5.6)
HDLC SERPL-MCNC: 33 MG/DL (ref 40–60)
LDLC SERPL CALC-MCNC: 80 MG/DL (ref 0–100)
LDLC/HDLC SERPL: 2.03 {RATIO}
MICROALBUMIN/CREAT UR: 285.7 MG/G
POTASSIUM SERPL-SCNC: 4.5 MMOL/L (ref 3.5–5.2)
PROT SERPL-MCNC: 7 G/DL (ref 6–8.5)
SODIUM SERPL-SCNC: 136 MMOL/L (ref 136–145)
TRIGL SERPL-MCNC: 350 MG/DL (ref 0–150)
VLDLC SERPL-MCNC: 57 MG/DL (ref 5–40)

## 2021-10-04 ENCOUNTER — TELEPHONE (OUTPATIENT)
Dept: INTERNAL MEDICINE | Facility: CLINIC | Age: 76
End: 2021-10-04

## 2021-10-04 DIAGNOSIS — E11.42 DIABETIC PERIPHERAL NEUROPATHY (HCC): ICD-10-CM

## 2021-10-04 RX ORDER — GABAPENTIN 300 MG/1
300 CAPSULE ORAL 3 TIMES DAILY
Qty: 90 CAPSULE | Refills: 0 | Status: SHIPPED | OUTPATIENT
Start: 2021-10-04 | End: 2021-12-16

## 2021-10-04 NOTE — TELEPHONE ENCOUNTER
Caller: Sen Paiz    Relationship: Self    Best call back number: [phone] CAN LEAVE VM     Which medication are you concerned about: Insulin Glargine (LANTUS SOLOSTAR) 100 UNIT/ML injection pen AND TRULICITY       What are your concerns: PATIENT THOUGHT HE WAS GOING TO BE GIVEN TRULICITY, BUT WAS TOLD THAT HE WAS ACTUALLY PRESCRIBED LANTUS. PATIENT WOULD LIKE TO KNOW WHY IT WAS CHANGED. PATIENT DOES NOT REALLY WANT TO BE ON A DAILY INJECTABLE. IF HE IS TO USE THE LANTUS, PATIENT IS NEEDING NEEDLES.    PATIENT ALSO STATED HE WAS STARTED ON  pregabalin (Lyrica) 50 MG capsule IN PLACE OF gabapentin (NEURONTIN) 300 MG capsule, BUT IS NEEDING A PA FOR THE LYRICA. PATIENT STATES IF IT CANNOT BE PROCESSED TODAY, HE WOULD LIKE A REFILL OF GABAPENTIN UNTIL IT IS STRAIGHTENED OUT

## 2021-10-05 NOTE — TELEPHONE ENCOUNTER
Spoke with patient directly he is going to take the Trulicity and Lantus gabapentin has been refilled Lyrica has been ordered but was unable to obtain it and follows up in the office in a month

## 2021-10-15 ENCOUNTER — TELEPHONE (OUTPATIENT)
Dept: INTERNAL MEDICINE | Facility: CLINIC | Age: 76
End: 2021-10-15

## 2021-10-15 NOTE — TELEPHONE ENCOUNTER
Patient didn't start taking the injection until the 5th -  Blood sugar 5th was 193, 6th 209, 7th 218, 9th 245, then 256, 10th 224, 11th 224, 13th 252, then 275, 15th 280.  He feels tired but no other symptoms. Please advise.   .

## 2021-10-15 NOTE — TELEPHONE ENCOUNTER
Caller: Sen Paiz    Relationship: Self    Best call back number: [phone]     What is the best time to reach you: ASAP    Who are you requesting to speak with (clinical staff, provider,  specific staff member): CLINICAL / [place]    Do you know the name of the person who called:     What was the call regarding: PATIENT CALLING WITH CONCERNS OF HIS INSULIN HE STATED HIS GLUCOSE NUMBERS ARE GOING UP INSTEAD OF GOING DOWN HE WOULD LIKE TO KNOW IF HE NEEDS TO INCREASE THE UNITS OF HIS INSULIN    Do you require a callback: YES

## 2021-10-15 NOTE — TELEPHONE ENCOUNTER
Spoke with patient Trulicity not covered with his insurance gradually increase Lantus 3 units every 3 days until morning blood sugar less than 150 has follow-up appointment October 29

## 2021-10-19 ENCOUNTER — PRIOR AUTHORIZATION (OUTPATIENT)
Dept: INTERNAL MEDICINE | Facility: CLINIC | Age: 76
End: 2021-10-19

## 2021-10-22 ENCOUNTER — TELEPHONE (OUTPATIENT)
Dept: INTERNAL MEDICINE | Facility: CLINIC | Age: 76
End: 2021-10-22

## 2021-10-22 NOTE — TELEPHONE ENCOUNTER
Spoke to [facility] and the PA has been sent for review.  Turn around time 72 hours.

## 2021-10-22 NOTE — TELEPHONE ENCOUNTER
[facility] IS REQUESTING A CALL BACK TO HELP FINISH A PA TRULICITY.     CALL BACK [phone]  REF [phone]

## 2021-10-29 ENCOUNTER — OFFICE VISIT (OUTPATIENT)
Dept: INTERNAL MEDICINE | Facility: CLINIC | Age: 76
End: 2021-10-29

## 2021-10-29 VITALS
OXYGEN SATURATION: 98 % | HEART RATE: 90 BPM | HEIGHT: 73 IN | SYSTOLIC BLOOD PRESSURE: 130 MMHG | BODY MASS INDEX: 30.43 KG/M2 | DIASTOLIC BLOOD PRESSURE: 66 MMHG | WEIGHT: 229.6 LBS

## 2021-10-29 DIAGNOSIS — E11.42 DIABETIC PERIPHERAL NEUROPATHY (HCC): ICD-10-CM

## 2021-10-29 DIAGNOSIS — E11.40 TYPE 2 DIABETES MELLITUS WITH DIABETIC NEUROPATHY, WITHOUT LONG-TERM CURRENT USE OF INSULIN (HCC): Primary | ICD-10-CM

## 2021-10-29 PROCEDURE — 99214 OFFICE O/P EST MOD 30 MIN: CPT | Performed by: FAMILY MEDICINE

## 2021-10-29 RX ORDER — PREGABALIN 50 MG/1
50 CAPSULE ORAL 2 TIMES DAILY
Qty: 60 CAPSULE | Refills: 3
Start: 2021-10-29 | End: 2021-12-16 | Stop reason: SDUPTHER

## 2021-10-29 RX ORDER — SEMAGLUTIDE 1.34 MG/ML
0.25 INJECTION, SOLUTION SUBCUTANEOUS WEEKLY
Qty: 1.5 ML | Refills: 2 | COMMUNITY
Start: 2021-10-29 | End: 2022-02-22

## 2021-10-29 RX ORDER — MECOBAL/LEVOMEFOLAT CA/B6 PHOS 2-3-35 MG
1 TABLET ORAL 2 TIMES DAILY
COMMUNITY
End: 2023-02-23

## 2021-10-29 NOTE — PROGRESS NOTES
"Chief Complaint  follow up to hypertension and follow up to hyperlipidemia    Subjective          Sen Paiz presents to [facility]  History of Present Illness  Patient follows up for ongoing management of diabetes he has had consultation with [facility] difficulty obtaining the Lyrica as well as Trulicity he has gradually increased his Lantus and is up to 20 units daily now on today's morning blood sugar was at 1 57  Which she says is the lowest has been in many years  He has no chest pain no shortness of breath no increased fatigue and is willing to start the Trulicity but he would rather have the Ozempic so they told him that would be cheaper  He is going to wean him stop the gabapentin and trial Lyrica for ongoing neuropathy  He brings in room request from his podiatrist for further foot evaluation to obtain better foot wear  Objective   Vital Signs:   /66 (BP Location: Left arm, Patient Position: Sitting, Cuff Size: Large Adult)   Pulse 90   Ht 185.4 cm (73\")   Wt 104 kg (229 lb 9.6 oz)   SpO2 98%   BMI 30.29 kg/m²     Physical Exam  Cardiovascular:      Pulses:           Dorsalis pedis pulses are 2+ on the right side and 2+ on the left side.        Posterior tibial pulses are 2+ on the right side and 2+ on the left side.   Musculoskeletal:      Right foot: Decreased range of motion. Deformity present.      Left foot: Decreased range of motion. Deformity present.   Feet:      Right foot:      Protective Sensation: 6 sites sensed.      Skin integrity: Callus and dry skin present.      Left foot:      Protective Sensation: 6 sites sensed.      Skin integrity: Callus and dry skin present.      Comments: Diabetic foot exam:   Left: Filament test absent   Pulses Dorsalis Pedis:  present   Reflexes 2+    Vibratory sensation diminished   Proprioception diminished   Sharp/dull discrimination diminished       Right: Filament test absent   Pulses Dorsalis Pedis:  " present   Reflexes 2+    Vibratory sensation diminished   Proprioception diminished   Sharp/dull discrimination diminished       Result Review :     Common labs    Common Labsle 11/16/20 11/16/20 11/16/20 11/16/20 11/16/20 9/29/21 9/29/21 9/29/21 9/29/21    0854 0854 0854 0854 0854 1049 1049 1049 1049   Glucose       229 (A)     Glucose     211 (A)       BUN     25 (A)  19     Creatinine     1.00  0.66 (A)     eGFR Non African Am     73  118     eGFR African Am     89       Sodium     138  136     Potassium     5.2  4.5     Chloride     96 (A)  98     Calcium     9.8  9.9     Albumin       4.80     Total Bilirubin       0.9     Alkaline Phosphatase       67     AST (SGOT)       21     ALT (SGPT)       24     WBC 9.10           Hemoglobin 17.3           Hematocrit 49.4           Platelets 188           Total Cholesterol        170    Total Cholesterol  188          Triglycerides  504 (A)      350 (A)    HDL Cholesterol  36 (A)      33 (A)    LDL Cholesterol   73      80    Hemoglobin A1C   8.80 (A)   9.52 (A)      Microalbumin, Urine    91.1     10.8   (A) Abnormal value       Comments are available for some flowsheets but are not being displayed.                     Assessment and Plan    Diagnoses and all orders for this visit:    1. Type 2 diabetes mellitus with diabetic neuropathy, without long-term current use of insulin (HCC) (Primary)  -     Semaglutide,0.25 or 0.5MG/DOS, (Ozempic, 0.25 or 0.5 MG/DOSE,) 2 MG/1.5ML solution pen-injector; Inject 0.25 mg under the skin into the appropriate area as directed 1 (One) Time Per Week. .25 mg weekly x 4 then .5mg weekly  Dispense: 1.5 mL; Refill: 2    2. Diabetic peripheral neuropathy (HCC)  -     pregabalin (Lyrica) 50 MG capsule; Take 1 capsule by mouth 2 (Two) Times a Day.  Dispense: 60 capsule; Refill: 3      I spent 33 minutes caring for Sen on this date of service. This time includes time spent by me in the following activities:reviewing tests, performing a  medically appropriate examination and/or evaluation , counseling and educating the patient/family/caregiver, ordering medications, tests, or procedures and documenting information in the medical record  Follow Up   No follow-ups on file.  Patient was given instructions and counseling regarding his condition or for health maintenance advice. Please see specific information pulled into the AVS if appropriate.

## 2021-11-08 ENCOUNTER — TELEPHONE (OUTPATIENT)
Dept: INTERNAL MEDICINE | Facility: CLINIC | Age: 76
End: 2021-11-08

## 2021-11-08 NOTE — TELEPHONE ENCOUNTER
PATIENT CALLED BACK IN TO CHECK ON MEDICATION. STATES HE IS COMPLETLEY OUT. PLEASE ADVISE. THANK YOU.

## 2021-11-08 NOTE — TELEPHONE ENCOUNTER
Caller: Sen Paiz    Relationship: Self      Medication requested (name and dosage):   LANTUS 20 UNITS    Requested Prescriptions:   Requested Prescriptions      No prescriptions requested or ordered in this encounter        Pharmacy where request should be sent: [id number] [place], [place] - [address] AT [facility] - [phone] [facility] [phone]   [phone]      Does the patient have less than a 3 day supply:  [x] Yes  [] No    Judi Miranda Rep   11/08/21 10:02 EST

## 2021-12-08 ENCOUNTER — LAB (OUTPATIENT)
Dept: LAB | Facility: HOSPITAL | Age: 76
End: 2021-12-08

## 2021-12-08 DIAGNOSIS — E11.40 TYPE 2 DIABETES MELLITUS WITH DIABETIC NEUROPATHY, WITHOUT LONG-TERM CURRENT USE OF INSULIN (HCC): ICD-10-CM

## 2021-12-08 LAB — HBA1C MFR BLD: 8.94 % (ref 4.8–5.6)

## 2021-12-08 PROCEDURE — 83036 HEMOGLOBIN GLYCOSYLATED A1C: CPT

## 2021-12-08 PROCEDURE — 36415 COLL VENOUS BLD VENIPUNCTURE: CPT

## 2021-12-16 ENCOUNTER — OFFICE VISIT (OUTPATIENT)
Dept: INTERNAL MEDICINE | Facility: CLINIC | Age: 76
End: 2021-12-16

## 2021-12-16 VITALS
SYSTOLIC BLOOD PRESSURE: 120 MMHG | DIASTOLIC BLOOD PRESSURE: 56 MMHG | WEIGHT: 226.5 LBS | BODY MASS INDEX: 30.02 KG/M2 | HEART RATE: 104 BPM | OXYGEN SATURATION: 98 % | HEIGHT: 73 IN

## 2021-12-16 DIAGNOSIS — E78.2 MIXED HYPERLIPIDEMIA: ICD-10-CM

## 2021-12-16 DIAGNOSIS — E11.42 DIABETIC PERIPHERAL NEUROPATHY (HCC): ICD-10-CM

## 2021-12-16 DIAGNOSIS — I10 BENIGN ESSENTIAL HYPERTENSION: Primary | ICD-10-CM

## 2021-12-16 PROCEDURE — 99214 OFFICE O/P EST MOD 30 MIN: CPT | Performed by: FAMILY MEDICINE

## 2021-12-16 RX ORDER — PREGABALIN 200 MG/1
200 CAPSULE ORAL 2 TIMES DAILY
Qty: 60 CAPSULE | Refills: 1 | Status: SHIPPED | OUTPATIENT
Start: 2021-12-16 | End: 2022-02-22 | Stop reason: SDUPTHER

## 2021-12-16 NOTE — PROGRESS NOTES
"Chief Complaint  follow up to labs, Allergic Reaction, Constipation, and discuss pulmonary visit    Subjective          Sen Paiz presents to [facility]  History of Present Illness  Patient follows up after changes for diabetes medication including increasing Lantus adding Ozempic as well as Jardiance his A1c was over 9 and now is less than 9  He has no unwanted side effects of medication other than some constipation interestingly he talks about fullness in his belly with a known renal cyst that is 15 cm as we discussed the picture of the CT scan that was done in December 2020.  Blood pressure is well controlled but he has persistent bilateral neuropathy of his feet that keep him from getting a good night sleep he is gradually increased his Lyrica to 200 mg at bedtime    Objective   Vital Signs:   /56 (BP Location: Left arm, Patient Position: Sitting, Cuff Size: Adult)   Pulse 104   Ht 185.4 cm (73\")   Wt 103 kg (226 lb 8 oz)   SpO2 98%   BMI 29.88 kg/m²     Physical Exam  Vitals and nursing note reviewed.   Constitutional:       Appearance: Normal appearance.   HENT:      Head: Normocephalic and atraumatic.   Abdominal:      General: Bowel sounds are normal. There is distension.      Tenderness: There is abdominal tenderness. There is no right CVA tenderness, left CVA tenderness, guarding or rebound.      Hernia: No hernia is present.   Skin:     General: Skin is warm and dry.   Neurological:      Mental Status: He is alert.   Psychiatric:         Mood and Affect: Mood normal.         Behavior: Behavior normal.         Thought Content: Thought content normal.         Judgment: Judgment normal.        Result Review :     Common labs    Common Labsle 9/29/21 9/29/21 9/29/21 9/29/21 12/8/21    1049 1049 1049 1049    Glucose  229 (A)      BUN  19      Creatinine  0.66 (A)      eGFR Non African Am  118      Sodium  136      Potassium  4.5      Chloride  98      Calcium  " 9.9      Albumin  4.80      Total Bilirubin  0.9      Alkaline Phosphatase  67      AST (SGOT)  21      ALT (SGPT)  24      Total Cholesterol   170     Triglycerides   350 (A)     HDL Cholesterol   33 (A)     LDL Cholesterol    80     Hemoglobin A1C 9.52 (A)    8.94 (A)   Microalbumin, Urine    10.8    (A) Abnormal value            Data reviewed: Radiologic studies CT scan December 2020 large renal cyst          Assessment and Plan    Diagnoses and all orders for this visit:    1. Benign essential hypertension (Primary)    2. Diabetic peripheral neuropathy (HCC)  -     pregabalin (LYRICA) 200 MG capsule; Take 1 capsule by mouth 2 (Two) Times a Day.  Dispense: 60 capsule; Refill: 1    3. Mixed hyperlipidemia    Other orders  -     Insulin Glargine (LANTUS SOLOSTAR) 100 UNIT/ML injection pen; Inject 23 Units under the skin into the appropriate area as directed Every Night.  Dispense: 3 pen; Refill: 11    Recommend follow-up with urology for renal cyst space-occupying lesion  Hypertension continue irbesartan  Hyperlipidemia continue atorvastatin  Diabetes increase Lantus to 23 units as well as Ozempic 2.5 mg weekly samples provided      Follow Up   Return in about 3 months (around 3/16/2022), or if symptoms worsen or fail to improve, for Recheck.  Patient was given instructions and counseling regarding his condition or for health maintenance advice. Please see specific information pulled into the AVS if appropriate.

## 2022-01-21 DIAGNOSIS — E11.40 TYPE 2 DIABETES MELLITUS WITH DIABETIC NEUROPATHY, WITHOUT LONG-TERM CURRENT USE OF INSULIN: Primary | ICD-10-CM

## 2022-01-25 ENCOUNTER — PATIENT OUTREACH (OUTPATIENT)
Dept: CASE MANAGEMENT | Facility: OTHER | Age: 77
End: 2022-01-25

## 2022-01-25 ENCOUNTER — LAB (OUTPATIENT)
Dept: LAB | Facility: HOSPITAL | Age: 77
End: 2022-01-25

## 2022-01-25 DIAGNOSIS — E11.40 TYPE 2 DIABETES MELLITUS WITH DIABETIC NEUROPATHY, WITHOUT LONG-TERM CURRENT USE OF INSULIN: ICD-10-CM

## 2022-01-25 LAB — HBA1C MFR BLD: 9.14 % (ref 4.8–5.6)

## 2022-01-25 PROCEDURE — 83036 HEMOGLOBIN GLYCOSYLATED A1C: CPT

## 2022-01-25 PROCEDURE — 36415 COLL VENOUS BLD VENIPUNCTURE: CPT

## 2022-01-25 NOTE — OUTREACH NOTE
Ambulatory Case Management Note    Patient Outreach    RN-ACM patient outreach regarding health and wellness. Talked with patient's wife as patient not available at this time. RN-ACM introduced role of RN-ACM and HRCM case management services. Wife states patient is compliant with medications; monitoring of blood sugars and blood sugars are improving. Wife verbalized understanding regarding RN-ACM; HRCM and denied needs or concerns for RN-ACM to address at this time.   General & Health Literacy Assessment    Questions/Answers      Most Recent Value   Assessment Completed With Patient   Living Arrangement Spouse  [Patient lives with spouse,  independent with ADL's,  meal preparation,  transportation and ambulates without assistive device .]   Type of Residence Private Residence   Home Care Services No   Equiptment Used at Home --  [Glucometer]   Bed or Wheelchair Confined No   Difficulty Keeping Appointments No        Care Evaluation    Questions/Answers      Most Recent Value   AWV Materials Send Materials   Care Gaps Addressed Other (See Comment),  Diabetic A1C,  Pneumonia Vaccine,  Flu Shot,  Diabetic Eye Exam  [COVID 19 vaccine completed. Has not completed booster at this  time. ]   HbA1c Status Up to Date-within defined limits   HbA1c Completion at [facility] or Other [facility]   Diabetic Eye Exam Status Up to Date   Flu Shot Status Up to Date   Pneumonia Vaccine Status Up to Date   Other Patient Education/Resources  [facility] Nurse Call Line,  Advanced Care Planning,  MyChart   [facility] Nurse Call Line Education Method Verbal   ACP Education Method Verbal   MyChart Education Method Verbal   Advanced Directives: --  [Wife has information and will complete]   Medication Adherence Medications understood  [Patient compliant with medications,  medical appointments and monitoring of blood sugars. ]   Healthy Lifestyle (Self-Efficacy) recognizes when to contact medical assistance,  self-reports important symptoms  to medical professional      SDOH updated and reviewed with the patient during this program:     Financial Resource Strain: Low Risk    • Difficulty of Paying Living Expenses: Not hard at all       Food Insecurity: No Food Insecurity   • Worried About Running Out of Food in the Last Year: Never true   • Ran Out of Food in the Last Year: Never true       Transportation Needs: No Transportation Needs   • Lack of Transportation (Medical): No   • Lack of Transportation (Non-Medical): No       Trupti Mondragon RN  Ambulatory Case Management    1/25/2022, 14:29 EST

## 2022-02-19 DIAGNOSIS — E11.42 DIABETIC PERIPHERAL NEUROPATHY: ICD-10-CM

## 2022-02-22 ENCOUNTER — OFFICE VISIT (OUTPATIENT)
Dept: INTERNAL MEDICINE | Facility: CLINIC | Age: 77
End: 2022-02-22

## 2022-02-22 VITALS
HEART RATE: 93 BPM | OXYGEN SATURATION: 96 % | BODY MASS INDEX: 30.64 KG/M2 | SYSTOLIC BLOOD PRESSURE: 120 MMHG | HEIGHT: 73 IN | DIASTOLIC BLOOD PRESSURE: 70 MMHG | WEIGHT: 231.2 LBS

## 2022-02-22 DIAGNOSIS — Z00.00 MEDICARE ANNUAL WELLNESS VISIT, SUBSEQUENT: Primary | ICD-10-CM

## 2022-02-22 DIAGNOSIS — I10 BENIGN ESSENTIAL HYPERTENSION: ICD-10-CM

## 2022-02-22 DIAGNOSIS — K59.00 CONSTIPATION, UNSPECIFIED CONSTIPATION TYPE: ICD-10-CM

## 2022-02-22 DIAGNOSIS — E78.2 MIXED HYPERLIPIDEMIA: ICD-10-CM

## 2022-02-22 DIAGNOSIS — E11.40 TYPE 2 DIABETES MELLITUS WITH DIABETIC NEUROPATHY, WITHOUT LONG-TERM CURRENT USE OF INSULIN: ICD-10-CM

## 2022-02-22 DIAGNOSIS — E11.42 DIABETIC PERIPHERAL NEUROPATHY: ICD-10-CM

## 2022-02-22 PROCEDURE — 1159F MED LIST DOCD IN RCRD: CPT | Performed by: FAMILY MEDICINE

## 2022-02-22 PROCEDURE — 96160 PT-FOCUSED HLTH RISK ASSMT: CPT | Performed by: FAMILY MEDICINE

## 2022-02-22 PROCEDURE — 1126F AMNT PAIN NOTED NONE PRSNT: CPT | Performed by: FAMILY MEDICINE

## 2022-02-22 PROCEDURE — 1170F FXNL STATUS ASSESSED: CPT | Performed by: FAMILY MEDICINE

## 2022-02-22 PROCEDURE — 99214 OFFICE O/P EST MOD 30 MIN: CPT | Performed by: FAMILY MEDICINE

## 2022-02-22 PROCEDURE — G0439 PPPS, SUBSEQ VISIT: HCPCS | Performed by: FAMILY MEDICINE

## 2022-02-22 RX ORDER — PREGABALIN 200 MG/1
200 CAPSULE ORAL 2 TIMES DAILY
Qty: 60 CAPSULE | Refills: 3 | Status: SHIPPED | OUTPATIENT
Start: 2022-02-22 | End: 2022-06-17

## 2022-02-22 RX ORDER — BLOOD SUGAR DIAGNOSTIC
1 STRIP MISCELLANEOUS 2 TIMES DAILY
Qty: 100 EACH | Refills: 6 | Status: SHIPPED | OUTPATIENT
Start: 2022-02-22 | End: 2023-03-27

## 2022-02-22 RX ORDER — PREGABALIN 200 MG/1
CAPSULE ORAL
Qty: 60 CAPSULE | Refills: 0 | OUTPATIENT
Start: 2022-02-22

## 2022-02-22 RX ORDER — SEMAGLUTIDE 1.34 MG/ML
1 INJECTION, SOLUTION SUBCUTANEOUS WEEKLY
Qty: 3 ML | Refills: 6 | Status: SHIPPED | OUTPATIENT
Start: 2022-02-22 | End: 2022-10-24

## 2022-02-22 NOTE — PROGRESS NOTES
The ABCs of the Annual Wellness Visit  Subsequent Medicare Wellness Visit    Chief Complaint   Patient presents with   • Medicare Wellness-subsequent   • Constipation   • Rash      Subjective    History of Present Illness:  The following portions of the patient's history were reviewed and   updated as appropriate: allergies, current medications, past family history, past medical history, past social history, past surgical history and problem list.     Compared to one year ago, the patient feels his physical   health is the same.    Compared to one year ago, the patient feels his mental   health is the same.    Recent Hospitalizations:  He was not admitted to the hospital during the last year.       Current Medical Providers:  Patient Care Team:  Jeyson Lozoya MD as PCP - General (Family Medicine)  Ernst Crowder MD as Consulting Physician (Hematology and Oncology)  Trupti Mondragon RN as Ambulatory  ([facility])    Outpatient Medications Prior to Visit   Medication Sig Dispense Refill   • Accu-Chek FastClix Lancets misc USE TO TEST 2 TIMES A  each 0   • aspirin 81 MG chewable tablet Chew 81 mg Daily.     • empagliflozin (Jardiance) 25 MG tablet tablet Take 1 tablet by mouth Daily. 90 tablet 3   • Insulin Glargine (LANTUS SOLOSTAR) 100 UNIT/ML injection pen Inject 23 Units under the skin into the appropriate area as directed Every Night. 3 pen 11   • Insulin Pen Needle 32G X 5 MM misc 1 Units Daily. 100 each 3   • irbesartan (AVAPRO) 300 MG tablet Take 1 tablet by mouth Every Night. 90 tablet 3   • L-Methylfolate-B6-B12 3-35-2 MG tablet Take 1 tablet by mouth 2 (Two) Times a Day.     • metFORMIN ER (GLUCOPHAGE-XR) 500 MG 24 hr tablet Take 2 tablets by mouth 2 (two) times a day. 360 tablet 3   • MULTIPLE VITAMINS PO Take by mouth.     • Naproxen Sod-diphenhydrAMINE (Aleve PM) 220-25 MG tablet Take 1 tablet by mouth Daily.     • naproxen sodium (ALEVE) 220 MG tablet Take 220 mg by  mouth 2 (Two) Times a Day As Needed.     • Accu-Chek Guide test strip USE TO TEST BLOOD SUGAR TWICE DAILY 100 each 5   • pregabalin (LYRICA) 200 MG capsule Take 1 capsule by mouth 2 (Two) Times a Day. 60 capsule 1   • Semaglutide,0.25 or 0.5MG/DOS, (Ozempic, 0.25 or 0.5 MG/DOSE,) 2 MG/1.5ML solution pen-injector Inject 0.25 mg under the skin into the appropriate area as directed 1 (One) Time Per Week. .25 mg weekly x 4 then .5mg weekly (Patient taking differently: Inject 0.5 mg under the skin into the appropriate area as directed 1 (One) Time Per Week. .25 mg weekly x 4 then .5mg weekly) 1.5 mL 2   • atorvastatin (Lipitor) 40 MG tablet Take 1 tablet by mouth Daily. 90 tablet 3   • Coenzyme Q10 (COQ-10) 400 MG capsule Take 200 mg by mouth Daily.     • HIBICLENS 4 % external liquid ASIF EXT AA BID       No facility-administered medications prior to visit.       No opioid medication identified on active medication list. I have reviewed chart for other potential  high risk medication/s and harmful drug interactions in the elderly.          Aspirin is on active medication list. Aspirin use is indicated based on review of current medical condition/s. Pros and cons of this therapy have been discussed today. Benefits of this medication outweigh potential harm.  Patient has been encouraged to continue taking this medication.  .      Patient Active Problem List   Diagnosis   • Benign essential hypertension   • Asymptomatic cholelithiasis   • Type 2 diabetes mellitus with diabetic neuropathy, without long-term current use of insulin (HCC)   • Knee pain   • Fatigue   • Diabetic peripheral neuropathy (HCC)   • Dupuytren's contracture of both hands   • Annual physical exam   • Mixed hyperlipidemia   • Granulocytosis   • Elevated hemoglobin (HCC)   • Erythrocytosis   • Tobacco dependency   • Acute respiratory failure with hypoxia ([facility])   • Community acquired pneumonia of left lower lobe of lung   • Influenza A   • Severe sepsis  "(HCC)   • Tobacco abuse   • Pulmonary nodules   • Shortness of breath   • Pulmonary emphysema (HCC)   • Kidney lesion   • Medicare annual wellness visit, initial   • Renal cyst, acquired, left   • Medicare annual wellness visit, subsequent     Advance Care Planning   Advance Directive is not on file.  ACP discussion was held with the patient during this visit. Patient has an advance directive (not in EMR), copy requested.          Objective       Vitals:    02/22/22 1517   BP: 120/70   BP Location: Right arm   Patient Position: Sitting   Cuff Size: Large Adult   Pulse: 93   SpO2: 96%   Weight: 105 kg (231 lb 3.2 oz)   Height: 185.4 cm (73\")   PainSc: 0-No pain     BMI Readings from Last 1 Encounters:   02/22/22 30.50 kg/m²   BMI is above normal parameters. Recommendations include: nutrition counseling    Does the patient have evidence of cognitive impairment? No    Physical Exam  Lab Results   Component Value Date    HGBA1C 9.14 (H) 01/25/2022            HEALTH RISK ASSESSMENT    Smoking Status:  Social History     Tobacco Use   Smoking Status Former Smoker   • Packs/day: 2.00   • Types: Cigarettes   Smokeless Tobacco Never Used   Tobacco Comment    52+ years     Alcohol Consumption:  Social History     Substance and Sexual Activity   Alcohol Use Yes    Comment: occasional     Fall Risk Screen:    STEADI Fall Risk Assessment was completed, and patient is at MODERATE risk for falls. Assessment completed on:2/22/2022    Depression Screening:  PHQ-2/PHQ-9 Depression Screening 2/22/2022   Little interest or pleasure in doing things 1   Feeling down, depressed, or hopeless 1   Trouble falling or staying asleep, or sleeping too much 3   Feeling tired or having little energy 3   Poor appetite or overeating 0   Feeling bad about yourself - or that you are a failure or have let yourself or your family down 0   Trouble concentrating on things, such as reading the newspaper or watching television 0   Moving or speaking so " slowly that other people could have noticed. Or the opposite - being so fidgety or restless that you have been moving around a lot more than usual 0   Thoughts that you would be better off dead, or of hurting yourself in some way 0   Total Score 8   If you checked off any problems, how difficult have these problems made it for you to do your work, take care of things at home, or get along with other people? Not difficult at all       Health Habits and Functional and Cognitive Screening:  Functional & Cognitive Status 2/22/2022   Do you have difficulty preparing food and eating? No   Do you have difficulty bathing yourself, getting dressed or grooming yourself? No   Do you have difficulty using the toilet? No   Do you have difficulty moving around from place to place? No   Do you have trouble with steps or getting out of a bed or a chair? No   Current Diet Well Balanced Diet   Dental Exam Not up to date   Eye Exam Not up to date   Exercise (times per week) 3 times per week   Current Exercises Include Home Fitness Gym   Do you need help using the phone?  No   Are you deaf or do you have serious difficulty hearing?  No   Do you need help with transportation? No   Do you need help shopping? No   Do you need help preparing meals?  No   Do you need help with housework?  No   Do you need help with laundry? No   Do you need help taking your medications? No   Do you need help managing money? No   Do you ever drive or ride in a car without wearing a seat belt? No   Have you felt unusual stress, anger or loneliness in the last month? -   Who do you live with? -   If you need help, do you have trouble finding someone available to you? -   Have you been bothered in the last four weeks by sexual problems? -   Do you have difficulty concentrating, remembering or making decisions? -       Age-appropriate Screening Schedule:  Refer to the list below for future screening recommendations based on patient's age, sex and/or medical  conditions. Orders for these recommended tests are listed in the plan section. The patient has been provided with a written plan.    Health Maintenance   Topic Date Due   • TDAP/TD VACCINES (1 - Tdap) Never done   • ZOSTER VACCINE (3 of 3) 03/08/2018   • DIABETIC EYE EXAM  01/21/2022   • HEMOGLOBIN A1C  07/25/2022   • LIPID PANEL  09/29/2022   • URINE MICROALBUMIN  09/29/2022   • INFLUENZA VACCINE  Completed   • DIABETIC FOOT EXAM  Discontinued              Assessment/Plan     CMS Preventative Services Quick Reference  Risk Factors Identified During Encounter  Obesity/Overweight   The above risks/problems have been discussed with the patient.  Follow up actions/plans if indicated are seen below in the Assessment/Plan Section.  Pertinent information has been shared with the patient in the After Visit Summary.    Diagnoses and all orders for this visit:    1. Medicare annual wellness visit, subsequent (Primary)    2. Type 2 diabetes mellitus with diabetic neuropathy, without long-term current use of insulin (HCC)    3. Benign essential hypertension    4. Mixed hyperlipidemia    5. Constipation, unspecified constipation type    6. Diabetic peripheral neuropathy (HCC)  -     pregabalin (LYRICA) 200 MG capsule; Take 1 capsule by mouth 2 (Two) Times a Day.  Dispense: 60 capsule; Refill: 3    Other orders  -     Semaglutide, 1 MG/DOSE, (Ozempic, 1 MG/DOSE,) 4 MG/3ML solution pen-injector; Inject 1 mg under the skin into the appropriate area as directed 1 (One) Time Per Week.  Dispense: 3 mL; Refill: 6  -     glucose blood (Accu-Chek Guide) test strip; 1 each by Other route 2 (Two) Times a Day. Use as instructed  Dispense: 100 each; Refill: 6      metamucil  Follow Up:   Return in about 2 months (around 4/22/2022), or if symptoms worsen or fail to improve, for Recheck.     An After Visit Summary and PPPS were given to the patient.  Ongoing  management of chronic medical problems.

## 2022-02-22 NOTE — PROGRESS NOTES
"Chief Complaint  Medicare Wellness-subsequent, Constipation, and Rash    Subjective          Sen Paiz presents to [facility]  History of Present Illness  Sen Paiz is a 76 y.o. male who presents for a Subsequent Medicare Wellness Visit.  Patient follows up to discuss diabetes management constipation hypertension hyperlipidemia as well as ongoing concern of renal cyst that has been addressed by urology  His blood pressure is well controlled he is monitoring his blood sugars and trying to correlate with a average A1c although he only checks his blood sugars in the morning  Most recent A1c is above is 9.  He was initiated on Ozempic recently and as well as titrating increase his Lantus  He has had longstanding constipation is not due for follow-up colonoscopy for another couple years    Objective   Vital Signs:   /70 (BP Location: Right arm, Patient Position: Sitting, Cuff Size: Large Adult)   Pulse 93   Ht 185.4 cm (73\")   Wt 105 kg (231 lb 3.2 oz)   SpO2 96%   BMI 30.50 kg/m²     Physical Exam  Vitals and nursing note reviewed.   Constitutional:       Appearance: Normal appearance. He is obese.   HENT:      Head: Normocephalic and atraumatic.   Eyes:      General: No scleral icterus.  Cardiovascular:      Rate and Rhythm: Normal rate.      Pulses: Normal pulses.   Pulmonary:      Effort: Pulmonary effort is normal.      Breath sounds: Normal breath sounds.   Musculoskeletal:      Right lower leg: No edema.      Left lower leg: No edema.   Neurological:      Mental Status: He is alert.   Psychiatric:         Mood and Affect: Mood normal.         Behavior: Behavior normal.         Thought Content: Thought content normal.         Judgment: Judgment normal.        Result Review :     Common labs    Common Labsle 9/29/21 9/29/21 9/29/21 9/29/21 12/8/21 1/25/22    1049 1049 1049 1049     Glucose  229 (A)       BUN  19       Creatinine  0.66 (A)       eGFR Non  " Am  118       Sodium  136       Potassium  4.5       Chloride  98       Calcium  9.9       Albumin  4.80       Total Bilirubin  0.9       Alkaline Phosphatase  67       AST (SGOT)  21       ALT (SGPT)  24       Total Cholesterol   170      Triglycerides   350 (A)      HDL Cholesterol   33 (A)      LDL Cholesterol    80      Hemoglobin A1C 9.52 (A)    8.94 (A) 9.14 (A)   Microalbumin, Urine    10.8     (A) Abnormal value                      Assessment and Plan    Diagnoses and all orders for this visit:    1. Medicare annual wellness visit, subsequent (Primary)    2. Type 2 diabetes mellitus with diabetic neuropathy, without long-term current use of insulin (HCC)    3. Benign essential hypertension    4. Mixed hyperlipidemia    5. Constipation, unspecified constipation type    6. Diabetic peripheral neuropathy (HCC)  -     pregabalin (LYRICA) 200 MG capsule; Take 1 capsule by mouth 2 (Two) Times a Day.  Dispense: 60 capsule; Refill: 3    Other orders  -     Semaglutide, 1 MG/DOSE, (Ozempic, 1 MG/DOSE,) 4 MG/3ML solution pen-injector; Inject 1 mg under the skin into the appropriate area as directed 1 (One) Time Per Week.  Dispense: 3 mL; Refill: 6  -     glucose blood (Accu-Chek Guide) test strip; 1 each by Other route 2 (Two) Times a Day. Use as instructed  Dispense: 100 each; Refill: 6    Constipation Metamucil  Hyperlipidemia statin therapy  Hypertension monitor blood pressure with goal less than 140/90  Needs fasting A1c CMP lipids in 2 months    Follow Up   Return in about 2 months (around 4/22/2022), or if symptoms worsen or fail to improve, for Recheck.  Patient was given instructions and counseling regarding his condition or for health maintenance advice. Please see specific information pulled into the AVS if appropriate.

## 2022-03-04 ENCOUNTER — TELEPHONE (OUTPATIENT)
Dept: INTERNAL MEDICINE | Facility: CLINIC | Age: 77
End: 2022-03-04

## 2022-03-04 NOTE — TELEPHONE ENCOUNTER
Caller: Sen Paiz    Relationship: Self    Best call back number: [phone]    What orders are you requesting (i.e. lab or imaging): LABS/BLOODWORK      Where will you receive your lab/imaging services: OFFICE     Additional notes: PATIENT WANTS LABS DONE BEFORE FOLLOW UP APPOINTMENT. PLEASE CALL PATIENT ONCE ORDERS ARE PLACED.

## 2022-03-07 DIAGNOSIS — E11.40 TYPE 2 DIABETES MELLITUS WITH DIABETIC NEUROPATHY, WITHOUT LONG-TERM CURRENT USE OF INSULIN: Primary | ICD-10-CM

## 2022-04-19 ENCOUNTER — LAB (OUTPATIENT)
Dept: LAB | Facility: HOSPITAL | Age: 77
End: 2022-04-19

## 2022-04-19 DIAGNOSIS — E11.40 TYPE 2 DIABETES MELLITUS WITH DIABETIC NEUROPATHY, WITHOUT LONG-TERM CURRENT USE OF INSULIN: ICD-10-CM

## 2022-04-19 LAB
ALBUMIN SERPL-MCNC: 4.6 G/DL (ref 3.5–5.2)
ALBUMIN/GLOB SERPL: 2.3 G/DL
ALP SERPL-CCNC: 70 U/L (ref 39–117)
ALT SERPL W P-5'-P-CCNC: 28 U/L (ref 1–41)
ANION GAP SERPL CALCULATED.3IONS-SCNC: 11 MMOL/L (ref 5–15)
AST SERPL-CCNC: 19 U/L (ref 1–40)
BILIRUB SERPL-MCNC: 0.6 MG/DL (ref 0–1.2)
BUN SERPL-MCNC: 19 MG/DL (ref 8–23)
BUN/CREAT SERPL: 24.4 (ref 7–25)
CALCIUM SPEC-SCNC: 9.3 MG/DL (ref 8.6–10.5)
CHLORIDE SERPL-SCNC: 101 MMOL/L (ref 98–107)
CO2 SERPL-SCNC: 27 MMOL/L (ref 22–29)
CREAT SERPL-MCNC: 0.78 MG/DL (ref 0.76–1.27)
EGFRCR SERPLBLD CKD-EPI 2021: 93.6 ML/MIN/1.73
GLOBULIN UR ELPH-MCNC: 2 GM/DL
GLUCOSE SERPL-MCNC: 147 MG/DL (ref 65–99)
HBA1C MFR BLD: 7.8 % (ref 4.8–5.6)
POTASSIUM SERPL-SCNC: 4.3 MMOL/L (ref 3.5–5.2)
PROT SERPL-MCNC: 6.6 G/DL (ref 6–8.5)
SODIUM SERPL-SCNC: 139 MMOL/L (ref 136–145)

## 2022-04-19 PROCEDURE — 83036 HEMOGLOBIN GLYCOSYLATED A1C: CPT

## 2022-04-19 PROCEDURE — 36415 COLL VENOUS BLD VENIPUNCTURE: CPT

## 2022-04-19 PROCEDURE — 80053 COMPREHEN METABOLIC PANEL: CPT

## 2022-04-25 ENCOUNTER — OFFICE VISIT (OUTPATIENT)
Dept: INTERNAL MEDICINE | Facility: CLINIC | Age: 77
End: 2022-04-25

## 2022-04-25 VITALS
DIASTOLIC BLOOD PRESSURE: 70 MMHG | SYSTOLIC BLOOD PRESSURE: 123 MMHG | OXYGEN SATURATION: 98 % | HEIGHT: 73 IN | WEIGHT: 233.3 LBS | HEART RATE: 108 BPM | BODY MASS INDEX: 30.92 KG/M2

## 2022-04-25 DIAGNOSIS — I10 BENIGN ESSENTIAL HYPERTENSION: Primary | ICD-10-CM

## 2022-04-25 DIAGNOSIS — E11.40 TYPE 2 DIABETES MELLITUS WITH DIABETIC NEUROPATHY, WITHOUT LONG-TERM CURRENT USE OF INSULIN: ICD-10-CM

## 2022-04-25 DIAGNOSIS — E11.42 DIABETIC PERIPHERAL NEUROPATHY: ICD-10-CM

## 2022-04-25 PROCEDURE — 99214 OFFICE O/P EST MOD 30 MIN: CPT | Performed by: FAMILY MEDICINE

## 2022-04-25 RX ORDER — LANCETS
EACH MISCELLANEOUS
Qty: 306 EACH | Refills: 3 | Status: SHIPPED | OUTPATIENT
Start: 2022-04-25

## 2022-04-25 NOTE — PROGRESS NOTES
"Chief Complaint  follow up to hypertnsion and follow up to diabetes    Subjective          Sen Paiz presents to [facility]  History of Present Illness  Patient follows up for ongoing management chronic problems of hypertension diabetes neuropathy  He has had adjustments in his medications and his A1c is improved with addition of Ozempic he also has some constipation that is improved a little bit with Metamucil  Blood pressure is well controlled.  He has bilateral feet pain with numbness and tingling presently on gabapentin with some improvement  Objective   Vital Signs:   /70 (BP Location: Left arm, Patient Position: Sitting, Cuff Size: Adult)   Pulse 108   Ht 185.4 cm (73\")   Wt 106 kg (233 lb 4.8 oz)   SpO2 98%   BMI 30.78 kg/m²            Physical Exam   Result Review :     Common labs    Common Labsle 12/8/21 1/25/22 4/19/22 4/19/22      0932 0932   Glucose    147 (A)   BUN    19   Creatinine    0.78   Sodium    139   Potassium    4.3   Chloride    101   Calcium    9.3   Albumin    4.60   Total Bilirubin    0.6   Alkaline Phosphatase    70   AST (SGOT)    19   ALT (SGPT)    28   Hemoglobin A1C 8.94 (A) 9.14 (A) 7.80 (A)    (A) Abnormal value                      Assessment and Plan    Diagnoses and all orders for this visit:    1. Benign essential hypertension (Primary)    2. Diabetic peripheral neuropathy (HCC)    3. Type 2 diabetes mellitus with diabetic neuropathy, without long-term current use of insulin (HCC)    Other orders  -     metFORMIN (Glucophage) 500 MG tablet; Take 2 tablets by mouth 2 (Two) Times a Day With Meals.  Dispense: 360 tablet; Refill: 3  -     Accu-Chek FastClix Lancets misc; FSBS BID  Dispense: 306 each; Refill: 3  -     Canagliflozin (INVOKANA) 100 MG tablet tablet; Take 1 tablet by mouth Daily.  Dispense: 90 tablet; Refill: 1    Hypertension continue irbesartan  Diabetes change in medication to help his constipation reduce metformin " extended release to metformin immediate release    Jardiance was expensive so we will see if his insurance will cover the hypocholic which is a preferred drug for him  Neuropathy foot pain continue gabapentin  I spent 32 minutes caring for Sen on this date of service. This time includes time spent by me in the following activities:reviewing tests, performing a medically appropriate examination and/or evaluation , counseling and educating the patient/family/caregiver, ordering medications, tests, or procedures and documenting information in the medical record  Follow Up   Return in about 3 months (around 7/25/2022), or if symptoms worsen or fail to improve, for Recheck.  Patient was given instructions and counseling regarding his condition or for health maintenance advice. Please see specific information pulled into the AVS if appropriate.

## 2022-06-16 DIAGNOSIS — E11.42 DIABETIC PERIPHERAL NEUROPATHY: ICD-10-CM

## 2022-06-20 RX ORDER — PREGABALIN 200 MG/1
CAPSULE ORAL
Qty: 60 CAPSULE | Refills: 0 | Status: SHIPPED | OUTPATIENT
Start: 2022-06-20 | End: 2022-07-18

## 2022-07-18 DIAGNOSIS — E11.42 DIABETIC PERIPHERAL NEUROPATHY: ICD-10-CM

## 2022-07-18 RX ORDER — PREGABALIN 200 MG/1
CAPSULE ORAL
Qty: 60 CAPSULE | Refills: 1 | Status: SHIPPED | OUTPATIENT
Start: 2022-07-18 | End: 2022-09-14

## 2022-09-14 DIAGNOSIS — E11.42 DIABETIC PERIPHERAL NEUROPATHY: ICD-10-CM

## 2022-09-15 RX ORDER — ATORVASTATIN CALCIUM 40 MG/1
40 TABLET, FILM COATED ORAL DAILY
Qty: 90 TABLET | Refills: 1 | Status: SHIPPED | OUTPATIENT
Start: 2022-09-15 | End: 2023-02-13

## 2022-09-15 RX ORDER — PREGABALIN 200 MG/1
CAPSULE ORAL
Qty: 60 CAPSULE | Refills: 2 | Status: SHIPPED | OUTPATIENT
Start: 2022-09-15 | End: 2022-12-15

## 2022-10-20 DIAGNOSIS — E11.40 TYPE 2 DIABETES MELLITUS WITH DIABETIC NEUROPATHY, WITHOUT LONG-TERM CURRENT USE OF INSULIN: ICD-10-CM

## 2022-10-20 DIAGNOSIS — E11.40 TYPE 2 DIABETES MELLITUS WITH DIABETIC NEUROPATHY, WITHOUT LONG-TERM CURRENT USE OF INSULIN: Primary | ICD-10-CM

## 2022-10-21 LAB
ALBUMIN SERPL-MCNC: 4.5 G/DL (ref 3.5–5.2)
ALBUMIN/CREAT UR: 292 MG/G CREAT (ref 0–29)
ALBUMIN/GLOB SERPL: 2.6 G/DL
ALP SERPL-CCNC: 91 U/L (ref 39–117)
ALT SERPL-CCNC: 26 U/L (ref 1–41)
AST SERPL-CCNC: 20 U/L (ref 1–40)
BILIRUB SERPL-MCNC: 0.5 MG/DL (ref 0–1.2)
BUN SERPL-MCNC: 16 MG/DL (ref 8–23)
BUN/CREAT SERPL: 22.2 (ref 7–25)
CALCIUM SERPL-MCNC: 9.7 MG/DL (ref 8.6–10.5)
CHLORIDE SERPL-SCNC: 101 MMOL/L (ref 98–107)
CHOLEST SERPL-MCNC: 107 MG/DL (ref 0–200)
CO2 SERPL-SCNC: 30.8 MMOL/L (ref 22–29)
CREAT SERPL-MCNC: 0.72 MG/DL (ref 0.76–1.27)
CREAT UR-MCNC: 68.6 MG/DL
EGFRCR SERPLBLD CKD-EPI 2021: 95.3 ML/MIN/1.73
GLOBULIN SER CALC-MCNC: 1.7 GM/DL
GLUCOSE SERPL-MCNC: 159 MG/DL (ref 65–99)
HBA1C MFR BLD: 9.3 % (ref 4.8–5.6)
HDLC SERPL-MCNC: 33 MG/DL (ref 40–60)
LDLC SERPL CALC-MCNC: 50 MG/DL (ref 0–100)
MICROALBUMIN UR-MCNC: 200.2 UG/ML
POTASSIUM SERPL-SCNC: 5.2 MMOL/L (ref 3.5–5.2)
PROT SERPL-MCNC: 6.2 G/DL (ref 6–8.5)
SODIUM SERPL-SCNC: 143 MMOL/L (ref 136–145)
TRIGL SERPL-MCNC: 139 MG/DL (ref 0–150)
VLDLC SERPL CALC-MCNC: 24 MG/DL (ref 5–40)

## 2022-10-24 RX ORDER — SEMAGLUTIDE 1.34 MG/ML
INJECTION, SOLUTION SUBCUTANEOUS
Qty: 3 ML | Refills: 6 | Status: SHIPPED | OUTPATIENT
Start: 2022-10-24 | End: 2022-10-28 | Stop reason: DRUGHIGH

## 2022-10-24 RX ORDER — IRBESARTAN 300 MG/1
300 TABLET ORAL NIGHTLY
Qty: 90 TABLET | Refills: 0 | Status: SHIPPED | OUTPATIENT
Start: 2022-10-24 | End: 2023-01-16

## 2022-10-28 ENCOUNTER — OFFICE VISIT (OUTPATIENT)
Dept: INTERNAL MEDICINE | Facility: CLINIC | Age: 77
End: 2022-10-28

## 2022-10-28 VITALS
HEIGHT: 73 IN | HEART RATE: 97 BPM | WEIGHT: 222 LBS | SYSTOLIC BLOOD PRESSURE: 118 MMHG | DIASTOLIC BLOOD PRESSURE: 64 MMHG | BODY MASS INDEX: 29.42 KG/M2 | OXYGEN SATURATION: 95 % | TEMPERATURE: 96.9 F

## 2022-10-28 DIAGNOSIS — E78.2 MIXED HYPERLIPIDEMIA: ICD-10-CM

## 2022-10-28 DIAGNOSIS — E11.42 DIABETIC PERIPHERAL NEUROPATHY: ICD-10-CM

## 2022-10-28 DIAGNOSIS — R41.3 MEMORY LOSS: ICD-10-CM

## 2022-10-28 DIAGNOSIS — E11.40 TYPE 2 DIABETES MELLITUS WITH DIABETIC NEUROPATHY, WITHOUT LONG-TERM CURRENT USE OF INSULIN: Primary | ICD-10-CM

## 2022-10-28 DIAGNOSIS — I10 BENIGN ESSENTIAL HYPERTENSION: ICD-10-CM

## 2022-10-28 PROCEDURE — 99214 OFFICE O/P EST MOD 30 MIN: CPT | Performed by: FAMILY MEDICINE

## 2022-10-28 RX ORDER — INFLUENZA A VIRUS A/MICHIGAN/45/2015 X-275 (H1N1) ANTIGEN (FORMALDEHYDE INACTIVATED), INFLUENZA A VIRUS A/SINGAPORE/INFIMH-16-0019/2016 IVR-186 (H3N2) ANTIGEN (FORMALDEHYDE INACTIVATED), INFLUENZA B VIRUS B/PHUKET/3073/2013 ANTIGEN (FORMALDEHYDE INACTIVATED), AND INFLUENZA B VIRUS B/MARYLAND/15/2016 BX-69A ANTIGEN (FORMALDEHYDE INACTIVATED) 60; 60; 60; 60 UG/.7ML; UG/.7ML; UG/.7ML; UG/.7ML
INJECTION, SUSPENSION INTRAMUSCULAR
COMMUNITY
Start: 2022-09-18

## 2022-10-28 RX ORDER — SEMAGLUTIDE 1.34 MG/ML
INJECTION, SOLUTION SUBCUTANEOUS
COMMUNITY
Start: 2022-08-14 | End: 2022-10-28

## 2022-10-28 RX ORDER — SEMAGLUTIDE 2.68 MG/ML
2 INJECTION, SOLUTION SUBCUTANEOUS WEEKLY
Qty: 12 ML | Refills: 1 | Status: SHIPPED | OUTPATIENT
Start: 2022-10-28

## 2022-10-28 RX ORDER — CANAGLIFLOZIN 100 MG/1
TABLET, FILM COATED ORAL
COMMUNITY
Start: 2022-07-29 | End: 2022-10-28

## 2022-10-28 NOTE — PROGRESS NOTES
"Chief Complaint  Hypertension (6 mo f/u), Diabetes, and Fatigue (Pt states ongoing weakness in legs starting a few yrs ago but, has progressively gotten worse the last couple months,  he is starting to lose his balance more frequent.)    Subjective        Sen Paiz presents to [facility]  History of Present Illness  Patient follows up for ongoing management of chronic problems of hypertension diabetes neuropathy he has poorly controlled diabetes as his A1c is gone back up to 9 he has difficulty controlling his diet  Objective   Vital Signs:  /64 (BP Location: Left arm, Patient Position: Sitting, Cuff Size: Adult)   Pulse 97   Temp 96.9 °F (36.1 °C) (Temporal)   Ht 185.4 cm (73\")   Wt 101 kg (222 lb)   SpO2 95%   BMI 29.29 kg/m²   Estimated body mass index is 29.29 kg/m² as calculated from the following:    Height as of this encounter: 185.4 cm (73\").    Weight as of this encounter: 101 kg (222 lb).          Physical Exam  Vitals and nursing note reviewed.   Constitutional:       Appearance: He is obese.   HENT:      Head: Normocephalic and atraumatic.   Eyes:      General: No scleral icterus.  Cardiovascular:      Rate and Rhythm: Normal rate and regular rhythm.      Pulses: Normal pulses.   Pulmonary:      Effort: Pulmonary effort is normal.      Breath sounds: Normal breath sounds.   Abdominal:      Tenderness: There is no right CVA tenderness or left CVA tenderness.   Musculoskeletal:      Right lower leg: No edema.      Left lower leg: No edema.   Neurological:      Mental Status: He is alert.   Psychiatric:         Mood and Affect: Mood normal.         Behavior: Behavior normal.         Thought Content: Thought content normal.         Judgment: Judgment normal.        Result Review :    Common labs    Common Labs 1/25/22 4/19/22 4/19/22 10/20/22 10/20/22 10/20/22 10/20/22     0932 0932 0942 0942 0942 0942   Glucose   147 (A) 159 (A)      BUN   19 16    "   Creatinine   0.78 0.72 (A)      Sodium   139 143      Potassium   4.3 5.2      Chloride   101 101      Calcium   9.3 9.7      Total Protein    6.2      Albumin   4.60 4.50      Total Bilirubin   0.6 0.5      Alkaline Phosphatase   70 91      AST (SGOT)   19 20      ALT (SGPT)   28 26      Total Cholesterol     107     Triglycerides     139     HDL Cholesterol     33 (A)     LDL Cholesterol      50     Hemoglobin A1C 9.14 (A) 7.80 (A)     9.30 (A)   Microalbumin, Urine      200.2    (A) Abnormal value       Comments are available for some flowsheets but are not being displayed.                     Assessment and Plan   Diagnoses and all orders for this visit:    1. Type 2 diabetes mellitus with diabetic neuropathy, without long-term current use of insulin (HCC) (Primary)  -     Semaglutide, 2 MG/DOSE, (Ozempic, 2 MG/DOSE,) 8 MG/3ML solution pen-injector; Inject 2 mg under the skin into the appropriate area as directed 1 (One) Time Per Week.  Dispense: 12 mL; Refill: 1  -     empagliflozin (JARDIANCE) 25 MG tablet tablet; Take 1 tablet by mouth Daily.  Dispense: 90 tablet; Refill: 2    2. Memory loss  -     Ambulatory Referral to Neurology    3. Diabetic peripheral neuropathy (HCC)  -     Ambulatory Referral to Neurology    4. Benign essential hypertension    5. Mixed hyperlipidemia    Hypertension continue irbesartan  Adjust insulin after ozempic increase for 1 month  Hyperlipidemia continue statin  DR. ROSEN pulmonology in november       Follow Up   Return in about 3 months (around 1/28/2023), or if symptoms worsen or fail to improve, for Recheck.  Patient was given instructions and counseling regarding his condition or for health maintenance advice. Please see specific information pulled into the AVS if appropriate.

## 2022-11-01 RX ORDER — FLURBIPROFEN SODIUM 0.3 MG/ML
SOLUTION/ DROPS OPHTHALMIC
Qty: 100 EACH | Refills: 0 | Status: SHIPPED | OUTPATIENT
Start: 2022-11-01 | End: 2023-02-09

## 2022-11-10 ENCOUNTER — TRANSCRIBE ORDERS (OUTPATIENT)
Dept: ADMINISTRATIVE | Facility: HOSPITAL | Age: 77
End: 2022-11-10

## 2022-11-10 DIAGNOSIS — Z12.2 SCREENING FOR LUNG CANCER: Primary | ICD-10-CM

## 2022-11-15 ENCOUNTER — OFFICE VISIT (OUTPATIENT)
Dept: NEUROLOGY | Facility: CLINIC | Age: 77
End: 2022-11-15

## 2022-11-15 VITALS
OXYGEN SATURATION: 95 % | BODY MASS INDEX: 31.01 KG/M2 | SYSTOLIC BLOOD PRESSURE: 124 MMHG | WEIGHT: 234 LBS | HEIGHT: 73 IN | DIASTOLIC BLOOD PRESSURE: 78 MMHG | HEART RATE: 94 BPM

## 2022-11-15 DIAGNOSIS — R41.3 MEMORY LOSS: ICD-10-CM

## 2022-11-15 DIAGNOSIS — E11.40 TYPE 2 DIABETES MELLITUS WITH DIABETIC NEUROPATHY, WITHOUT LONG-TERM CURRENT USE OF INSULIN: Primary | ICD-10-CM

## 2022-11-15 PROCEDURE — 99205 OFFICE O/P NEW HI 60 MIN: CPT | Performed by: PSYCHIATRY & NEUROLOGY

## 2022-11-15 RX ORDER — MULTIPLE VITAMINS W/ MINERALS TAB 9MG-400MCG
TAB ORAL
COMMUNITY
Start: 2018-01-01

## 2022-11-15 RX ORDER — DULOXETIN HYDROCHLORIDE 60 MG/1
60 CAPSULE, DELAYED RELEASE ORAL DAILY
Qty: 30 CAPSULE | Refills: 2 | Status: SHIPPED | OUTPATIENT
Start: 2022-11-15 | End: 2023-02-23

## 2022-11-15 RX ORDER — DULOXETIN HYDROCHLORIDE 30 MG/1
30 CAPSULE, DELAYED RELEASE ORAL DAILY
Qty: 7 CAPSULE | Refills: 0 | Status: SHIPPED | OUTPATIENT
Start: 2022-11-15 | End: 2023-02-23

## 2022-11-15 NOTE — PROGRESS NOTES
Notes by MA:  Patient referred for memory loss and diabetic neuropathy. Pt sts he hasn't noticed a big difference in memory, but would like evaluated as mother had alzheimer's. Pt c/o of neuropathy of feet. Currently taking Lyrica 200 mg BID. Sts it helps the pain, but doesn't take the pain completely away.     Subjective:     Dear Dr. Lozoya, thank you for referring Mr. Chisholm for neurological consultation.  As you know, he is a very pleasant 77-year-old gentleman sent for evaluation of complaints of memory loss as well as imbalance in the setting of peripheral polyneuropathy.  He is concerned about his memory because his mother  with Alzheimer's disease.  He has noticed that he sometimes forgets where he placed things.  He has to set more reminders for things throughout the day but he has not debilitated in any way by his memory issues and remains completely functionally independent.  His wife has not noticed any issue with cognition or memory.  With regard to his neuropathy, he was diagnosed with a diabetic neuropathy years ago and had electrodiagnostic testing.  He has numbness tingling and burning in both lower extremities that worsens throughout the day and keeps him up at night.  He was initially on gabapentin and titrated to 900 mg total daily dose but found this fairly ineffective and was switched to Lyrica.  He currently takes 2 capsules of Lyrica, 200 mg each, 1 in the evening and 1 later at night and finds this partially effective but still has a great deal of discomfort.  He complains of imbalance and has to watch his footing, especially on stairs.  Patient ID: Sen Paiz is a 77 y.o. male.    History of Present Illness  The following portions of the patient's history were reviewed and updated as appropriate: allergies, current medications, past family history, past medical history, past social history, past surgical history and problem list.    Review of Systems   Constitutional: Negative for  activity change, appetite change and fatigue.   HENT: Positive for voice change (since stopped smoking). Negative for facial swelling and trouble swallowing.    Eyes: Negative for photophobia, pain and visual disturbance.   Respiratory: Negative for chest tightness, shortness of breath and wheezing.    Cardiovascular: Negative for chest pain, palpitations and leg swelling.   Gastrointestinal: Negative for abdominal pain, nausea and vomiting.   Endocrine: Negative for cold intolerance and heat intolerance.   Musculoskeletal: Positive for gait problem. Negative for arthralgias, back pain, joint swelling, myalgias, neck pain and neck stiffness.   Neurological: Positive for numbness (feet). Negative for dizziness, tremors, seizures, syncope, facial asymmetry, speech difficulty, weakness, light-headedness and headaches.   Hematological: Does not bruise/bleed easily.   Psychiatric/Behavioral: Positive for decreased concentration. Negative for agitation, behavioral problems, confusion, dysphoric mood, hallucinations, self-injury, sleep disturbance and suicidal ideas. The patient is not nervous/anxious and is not hyperactive.         Objective:    Neurologic Exam  Awake alert pleasant cooperative, completely oriented with fluent speech and normal speech comprehension, normal language function.  He scored 30 out of 30 on the Folstein, fairly effortlessly.  He scored an excellent 25 on animal fluency and a normal 4 out of 4 on clock drawing.  Cranial nerves II through XII normal and symmetric.  Motor exam reveals reasonable and symmetric tone bulk and power without lateralizing spasticity or drift.  The sensory exam reveals a length dependent decrease in temperature sensation to above mid calf bilaterally and reduced vibration sensation to above the level of the knees bilaterally.  Proprioception is reduced at the level of the toes bilaterally.  Coordination testing reveals smooth and accurate finger-nose-finger and  reasonable toe taps and heel-to-fuller exams.  Romberg testing is borderline.  He walks in a mildly widened base but without any foot slap.  He has to turn slowly and has to readjust his balance.  Tendon reflexes are absent throughout.  Toes are mute bilaterally.  Physical Exam  Neck is supple.  No cervical bruits.  Pulses regular.  Abdomen is soft.  No extremity edema.  Chest is clear bilaterally.  Assessment/Plan:     Diagnoses and all orders for this visit:    1. Type 2 diabetes mellitus with diabetic neuropathy, without long-term current use of insulin ([facility]) (Primary)    2. Memory loss    Other orders  -     DULoxetine (Cymbalta) 30 MG capsule; Take 1 capsule by mouth Daily for 7 days.  Dispense: 7 capsule; Refill: 0  -     DULoxetine (Cymbalta) 60 MG capsule; Take 1 capsule by mouth Daily.  Dispense: 30 capsule; Refill: 2     77-year-old gentleman with diabetic peripheral polyneuropathy.  We talked about this diagnosis and what it entails for the future.  We talked about the danger of falls as his imbalance from sensory ataxia worsens.  We talked about the need for balance therapy which he declines at the current time.  From a symptomatic standpoint he is getting partial relief with his current dose of Lyrica 200 mg twice daily.  I discussed this with him and have recommended that we add Cymbalta with a titration from 30 mg every morning to 60 mg every morning over the next week.  We had a discussion about potential adverse events and potentially mitigating factors.    Normal age-related memory loss.  I reassured him from that standpoint.  He easily passed all of our cognitive screens today with completely normal results.  I am arranging for a B12 level to exclude any other modifiable causes for his complaints and stressed the importance of regular physical and mental exercise as well as a heart healthy diet and reducing his likelihood for dementia..  The rest of his laboratory work-up for modifiable causes has  been thorough.  I will review the results and take any further measures that may be necessary.    Thank you so much for the opportunity to participate in the care of this delightful gentleman.    80 minutes patient care time today.

## 2022-12-05 ENCOUNTER — HOSPITAL ENCOUNTER (OUTPATIENT)
Dept: CT IMAGING | Facility: HOSPITAL | Age: 77
Discharge: HOME | End: 2022-12-05
Admitting: INTERNAL MEDICINE

## 2022-12-05 DIAGNOSIS — Z12.2 SCREENING FOR LUNG CANCER: ICD-10-CM

## 2022-12-05 PROCEDURE — 71271 CT THORAX LUNG CANCER SCR C-: CPT

## 2022-12-15 DIAGNOSIS — E11.42 DIABETIC PERIPHERAL NEUROPATHY: ICD-10-CM

## 2022-12-15 RX ORDER — PREGABALIN 200 MG/1
CAPSULE ORAL
Qty: 60 CAPSULE | Refills: 2 | Status: SHIPPED | OUTPATIENT
Start: 2022-12-15 | End: 2023-03-17

## 2022-12-21 NOTE — TELEPHONE ENCOUNTER
Patient notified and expressed understanding.  Appointment scheduled for end of February.  Patient would like to have a hemoglobin A1C ordered before the appointment.  Please advise.

## 2022-12-22 DIAGNOSIS — E11.40 TYPE 2 DIABETES MELLITUS WITH DIABETIC NEUROPATHY, WITHOUT LONG-TERM CURRENT USE OF INSULIN: Primary | ICD-10-CM

## 2023-01-16 RX ORDER — INSULIN GLARGINE 100 [IU]/ML
INJECTION, SOLUTION SUBCUTANEOUS
Qty: 9 ML | Refills: 0 | Status: SHIPPED | OUTPATIENT
Start: 2023-01-16 | End: 2023-02-09

## 2023-01-16 RX ORDER — IRBESARTAN 300 MG/1
300 TABLET ORAL NIGHTLY
Qty: 90 TABLET | Refills: 0 | Status: SHIPPED | OUTPATIENT
Start: 2023-01-16

## 2023-02-09 RX ORDER — INSULIN GLARGINE 100 [IU]/ML
INJECTION, SOLUTION SUBCUTANEOUS
Qty: 9 ML | Refills: 0 | Status: SHIPPED | OUTPATIENT
Start: 2023-02-09 | End: 2023-02-15 | Stop reason: SDUPTHER

## 2023-02-09 RX ORDER — FLURBIPROFEN SODIUM 0.3 MG/ML
SOLUTION/ DROPS OPHTHALMIC
Qty: 100 EACH | Refills: 0 | Status: SHIPPED | OUTPATIENT
Start: 2023-02-09

## 2023-02-13 RX ORDER — ATORVASTATIN CALCIUM 40 MG/1
40 TABLET, FILM COATED ORAL DAILY
Qty: 90 TABLET | Refills: 1 | Status: SHIPPED | OUTPATIENT
Start: 2023-02-13

## 2023-02-13 RX ORDER — CANAGLIFLOZIN 100 MG/1
TABLET, FILM COATED ORAL
Qty: 90 TABLET | Refills: 1 | OUTPATIENT
Start: 2023-02-13

## 2023-02-13 NOTE — TELEPHONE ENCOUNTER
Patient called stating that he has been trying to get a 90 day supply filled for his insulin.  This authorization was called to Rip Lozoya.

## 2023-02-15 DIAGNOSIS — E11.40 TYPE 2 DIABETES MELLITUS WITH DIABETIC NEUROPATHY, WITHOUT LONG-TERM CURRENT USE OF INSULIN: Primary | ICD-10-CM

## 2023-02-15 RX ORDER — INSULIN GLARGINE 100 [IU]/ML
23 INJECTION, SOLUTION SUBCUTANEOUS NIGHTLY
Qty: 21 ML | Refills: 0 | Status: SHIPPED | OUTPATIENT
Start: 2023-02-15

## 2023-02-23 ENCOUNTER — OFFICE VISIT (OUTPATIENT)
Dept: INTERNAL MEDICINE | Facility: CLINIC | Age: 78
End: 2023-02-23
Payer: MEDICARE

## 2023-02-23 VITALS
HEART RATE: 95 BPM | OXYGEN SATURATION: 95 % | DIASTOLIC BLOOD PRESSURE: 60 MMHG | SYSTOLIC BLOOD PRESSURE: 124 MMHG | WEIGHT: 232.5 LBS | BODY MASS INDEX: 30.81 KG/M2 | HEIGHT: 73 IN

## 2023-02-23 DIAGNOSIS — J34.89 RHINORRHEA: ICD-10-CM

## 2023-02-23 DIAGNOSIS — E11.40 TYPE 2 DIABETES MELLITUS WITH DIABETIC NEUROPATHY, WITHOUT LONG-TERM CURRENT USE OF INSULIN: ICD-10-CM

## 2023-02-23 DIAGNOSIS — E78.2 MIXED HYPERLIPIDEMIA: ICD-10-CM

## 2023-02-23 DIAGNOSIS — F51.01 PRIMARY INSOMNIA: ICD-10-CM

## 2023-02-23 DIAGNOSIS — Z00.00 MEDICARE ANNUAL WELLNESS VISIT, SUBSEQUENT: Primary | ICD-10-CM

## 2023-02-23 DIAGNOSIS — Z00.00 ANNUAL PHYSICAL EXAM: ICD-10-CM

## 2023-02-23 DIAGNOSIS — I10 BENIGN ESSENTIAL HYPERTENSION: ICD-10-CM

## 2023-02-23 PROCEDURE — 99397 PER PM REEVAL EST PAT 65+ YR: CPT | Performed by: FAMILY MEDICINE

## 2023-02-23 PROCEDURE — 99214 OFFICE O/P EST MOD 30 MIN: CPT | Performed by: FAMILY MEDICINE

## 2023-02-23 PROCEDURE — 1125F AMNT PAIN NOTED PAIN PRSNT: CPT | Performed by: FAMILY MEDICINE

## 2023-02-23 PROCEDURE — 96160 PT-FOCUSED HLTH RISK ASSMT: CPT | Performed by: FAMILY MEDICINE

## 2023-02-23 PROCEDURE — 1170F FXNL STATUS ASSESSED: CPT | Performed by: FAMILY MEDICINE

## 2023-02-23 PROCEDURE — 1159F MED LIST DOCD IN RCRD: CPT | Performed by: FAMILY MEDICINE

## 2023-02-23 PROCEDURE — G0439 PPPS, SUBSEQ VISIT: HCPCS | Performed by: FAMILY MEDICINE

## 2023-02-23 RX ORDER — METFORMIN HYDROCHLORIDE 500 MG/1
1000 TABLET, EXTENDED RELEASE ORAL 2 TIMES DAILY
Qty: 360 TABLET | Refills: 2 | Status: SHIPPED | OUTPATIENT
Start: 2023-02-23

## 2023-02-23 RX ORDER — ZOLPIDEM TARTRATE 5 MG/1
5 TABLET ORAL NIGHTLY PRN
Qty: 30 TABLET | Refills: 0 | Status: SHIPPED | OUTPATIENT
Start: 2023-02-23 | End: 2023-03-17

## 2023-02-23 NOTE — PROGRESS NOTES
The ABCs of the Annual Wellness Visit  Subsequent Medicare Wellness Visit    Chief Complaint   Patient presents with   • follow up to diabetes   • Medicare Wellness-subsequent   • Annual Exam      Subjective    History of Present Illness:  Sen Paiz is a 77 y.o. male who presents for a Subsequent Medicare Wellness Visit.    The following portions of the patient's history were reviewed and   updated as appropriate: allergies, current medications, past family history, past medical history, past social history, past surgical history and problem list.     Compared to one year ago, the patient feels his physical   health is the same.    Compared to one year ago, the patient feels his mental   health is the same.    Recent Hospitalizations:  He was not admitted to the hospital during the last year.       Current Medical Providers:  Patient Care Team:  Jeyson Lozoya MD as PCP - General (Family Medicine)  Ernst Crowder MD as Consulting Physician (Hematology and Oncology)    Outpatient Medications Prior to Visit   Medication Sig Dispense Refill   • Accu-Chek FastClix Lancets misc FSBS  each 3   • aspirin 81 MG chewable tablet Chew 81 mg Daily.     • atorvastatin (LIPITOR) 40 MG tablet TAKE 1 TABLET BY MOUTH DAILY 90 tablet 1   • B-D UF III MINI PEN NEEDLES 31G X 5 MM misc USE EVERY DAY AS DIRECTED 100 each 0   • Coenzyme Q10 (COQ-10) 400 MG capsule Take 200 mg by mouth Daily.     • empagliflozin (JARDIANCE) 25 MG tablet tablet Take 1 tablet by mouth Daily. 90 tablet 2   • Fluzone High-Dose Quadrivalent 0.7 ML suspension prefilled syringe injection      • glucose blood (Accu-Chek Guide) test strip 1 each by Other route 2 (Two) Times a Day. Use as instructed 100 each 6   • Insulin Glargine (Lantus SoloStar) 100 UNIT/ML injection pen Inject 23 Units under the skin into the appropriate area as directed Every Night. 21 mL 0   • irbesartan (AVAPRO) 300 MG tablet TAKE 1 TABLET BY MOUTH EVERY NIGHT 90 tablet 0    • METAMUCIL FIBER PO Take  by mouth.     • metFORMIN (GLUCOPHAGE) 500 MG tablet TAKE 2 TABLETS BY MOUTH TWICE DAILY WITH MEALS 360 tablet 3   • multivitamin with minerals tablet tablet      • NON FORMULARY Magnilite DB cream     • NON FORMULARY Magnitlite Leg cream     • pregabalin (LYRICA) 200 MG capsule TAKE 1 CAPSULE BY MOUTH TWICE DAILY 60 capsule 2   • Semaglutide, 2 MG/DOSE, (Ozempic, 2 MG/DOSE,) 8 MG/3ML solution pen-injector Inject 2 mg under the skin into the appropriate area as directed 1 (One) Time Per Week. 12 mL 1   • HIBICLENS 4 % external liquid ASIF EXT AA BID     • doxycycline (MONODOX) 100 MG capsule Take 1 capsule by mouth 2 (Two) Times a Day. 20 capsule 0   • DULoxetine (Cymbalta) 30 MG capsule Take 1 capsule by mouth Daily for 7 days. 7 capsule 0   • DULoxetine (Cymbalta) 60 MG capsule Take 1 capsule by mouth Daily. 30 capsule 2   • guaiFENesin (MUCINEX) 600 MG 12 hr tablet Take 2 tablets by mouth 2 (Two) Times a Day. 30 tablet 0   • L-Methylfolate-B6-B12 3-35-2 MG tablet Take 1 tablet by mouth 2 (Two) Times a Day.     • MULTIPLE VITAMINS PO Take by mouth.     • Naproxen Sod-diphenhydrAMINE (Aleve PM) 220-25 MG tablet Take 1 tablet by mouth Daily.     • naproxen sodium (ALEVE) 220 MG tablet Take 220 mg by mouth 2 (Two) Times a Day As Needed.       No facility-administered medications prior to visit.       No opioid medication identified on active medication list. I have reviewed chart for other potential  high risk medication/s and harmful drug interactions in the elderly.          Aspirin is on active medication list. Aspirin use is not indicated based on review of current medical condition/s. Risk of harm outweighs potential benefits. Patient instructed to discontinue this medication.  .      Patient Active Problem List   Diagnosis   • Benign essential hypertension   • Asymptomatic cholelithiasis   • Type 2 diabetes mellitus with diabetic neuropathy, without long-term current use of insulin  "(HCC)   • Knee pain   • Fatigue   • Diabetic peripheral neuropathy (HCC)   • Dupuytren's contracture of both hands   • Annual physical exam   • Mixed hyperlipidemia   • Granulocytosis   • Elevated hemoglobin (HCC)   • Erythrocytosis   • Tobacco dependency   • Acute respiratory failure with hypoxia (HCC)   • Community acquired pneumonia of left lower lobe of lung   • Influenza A   • Severe sepsis (HCC)   • Tobacco abuse   • Pulmonary nodules   • Shortness of breath   • Pulmonary emphysema (HCC)   • Kidney lesion   • Medicare annual wellness visit, initial   • Renal cyst, acquired, left   • Medicare annual wellness visit, subsequent   • Memory loss     Advance Care Planning   Advance Directive is not on file.  ACP discussion was held with the patient during this visit. Patient has an advance directive (not in EMR), copy requested.          Objective       Vitals:    02/23/23 1233   BP: 124/60   BP Location: Right arm   Patient Position: Sitting   Cuff Size: Adult   Pulse: 95   SpO2: 95%   Weight: 105 kg (232 lb 8 oz)   Height: 185.4 cm (73\")   PainSc:   4     BMI Readings from Last 1 Encounters:   02/23/23 30.67 kg/m²   BMI is above normal parameters. Recommendations include: nutrition counseling    Does the patient have evidence of cognitive impairment? No    Physical Exam            HEALTH RISK ASSESSMENT    Smoking Status:  Social History     Tobacco Use   Smoking Status Former   • Packs/day: 2.00   • Types: Cigarettes   Smokeless Tobacco Never   Tobacco Comments    52+ years     Alcohol Consumption:  Social History     Substance and Sexual Activity   Alcohol Use Yes    Comment: occasional     Fall Risk Screen:    KANDICEADI Fall Risk Assessment was completed, and patient is at HIGH risk for falls. Assessment completed on:2/23/2023    Depression Screening:  PHQ-2/PHQ-9 Depression Screening 2/23/2023   Retired PHQ-9 Total Score -   Retired Total Score -   Little Interest or Pleasure in Doing Things 0-->not at all "   Feeling Down, Depressed or Hopeless 0-->not at all   PHQ-9: Brief Depression Severity Measure Score 0       Health Habits and Functional and Cognitive Screening:  Functional & Cognitive Status 2/23/2023   Do you have difficulty preparing food and eating? No   Do you have difficulty bathing yourself, getting dressed or grooming yourself? No   Do you have difficulty using the toilet? No   Do you have difficulty moving around from place to place? Yes   Do you have trouble with steps or getting out of a bed or a chair? Yes   Current Diet Well Balanced Diet   Dental Exam Up to date   Eye Exam Up to date   Exercise (times per week) 2 times per week   Current Exercises Include Walking;Yard Work   Do you need help using the phone?  No   Are you deaf or do you have serious difficulty hearing?  No   Do you need help with transportation? No   Do you need help shopping? No   Do you need help preparing meals?  No   Do you need help with housework?  No   Do you need help with laundry? No   Do you need help taking your medications? No   Do you need help managing money? No   Do you ever drive or ride in a car without wearing a seat belt? No   Have you felt unusual stress, anger or loneliness in the last month? No   Who do you live with? Spouse   If you need help, do you have trouble finding someone available to you? No   Have you been bothered in the last four weeks by sexual problems? No   Do you have difficulty concentrating, remembering or making decisions? Yes       Age-appropriate Screening Schedule:  Refer to the list below for future screening recommendations based on patient's age, sex and/or medical conditions. Orders for these recommended tests are listed in the plan section. The patient has been provided with a written plan.    Health Maintenance   Topic Date Due   • TDAP/TD VACCINES (1 - Tdap) Never done   • ZOSTER VACCINE (3 of 3) 03/08/2018   • COVID-19 Vaccine (3 - Booster for Pfizer series) 10/17/2021   • ANNUAL  WELLNESS VISIT  02/22/2023   • HEMOGLOBIN A1C  04/20/2023   • DIABETIC EYE EXAM  07/14/2023   • LIPID PANEL  10/20/2023   • URINE MICROALBUMIN  10/20/2023   • COLORECTAL CANCER SCREENING  07/30/2024   • HEPATITIS C SCREENING  Completed   • INFLUENZA VACCINE  Completed   • Pneumococcal Vaccine 65+  Completed   • AAA SCREEN (ONE-TIME)  Completed   • DIABETIC FOOT EXAM  Discontinued              Assessment & Plan     CMS Preventative Services Quick Reference  Risk Factors Identified During Encounter  diabetes  The above risks/problems have been discussed with the patient.  Follow up actions/plans if indicated are seen below in the Assessment/Plan Section.  Pertinent information has been shared with the patient in the After Visit Summary.    Diagnoses and all orders for this visit:    1. Medicare annual wellness visit, subsequent (Primary)        Follow Up:   Return in about 6 months (around 8/23/2023), or if symptoms worsen or fail to improve, for Recheck.     An After Visit Summary and PPPS were given to the patient.  Ongoing  management of chronic medical problems.

## 2023-02-23 NOTE — PROGRESS NOTES
Subjective   Sen Paiz is a 77 y.o. male.     Chief Complaint   Patient presents with   • follow up to diabetes   • Medicare Wellness-subsequent   • Annual Exam     Health maintenance    History of Present Illness   Sen Paiz 77 y.o. male who presents for an Annual Wellness Visit.  he has a history of   Patient Active Problem List   Diagnosis   • Benign essential hypertension   • Asymptomatic cholelithiasis   • Type 2 diabetes mellitus with diabetic neuropathy, without long-term current use of insulin (HCC)   • Knee pain   • Fatigue   • Diabetic peripheral neuropathy (HCC)   • Dupuytren's contracture of both hands   • Annual physical exam   • Mixed hyperlipidemia   • Granulocytosis   • Elevated hemoglobin (HCC)   • Erythrocytosis   • Tobacco dependency   • Acute respiratory failure with hypoxia (HCC)   • Community acquired pneumonia of left lower lobe of lung   • Influenza A   • Severe sepsis (HCC)   • Tobacco abuse   • Pulmonary nodules   • Shortness of breath   • Pulmonary emphysema (HCC)   • Kidney lesion   • Medicare annual wellness visit, initial   • Renal cyst, acquired, left   • Medicare annual wellness visit, subsequent   • Memory loss   • Primary insomnia   • Rhinorrhea   .  he has been feeling fairly well.   I  reviewed health maintenance with him as part of my preventative care plan.    The following portions of the patient's history were reviewed and updated as appropriate: allergies, current medications, past family history, past medical history, past social history, past surgical history and problem list.    Review of Systems   Constitutional: Positive for fatigue.   HENT: Negative.    Eyes: Negative.    Respiratory: Positive for shortness of breath.    Cardiovascular: Negative for chest pain.   Endocrine: Positive for polyuria. Negative for polydipsia and polyphagia.   Musculoskeletal: Positive for arthralgias and gait problem.   Skin: Negative for pallor.   Neurological: Positive for  weakness. Negative for dizziness, tremors, seizures, speech difficulty and headaches.        Neuropathy feet and legs   Psychiatric/Behavioral: Positive for sleep disturbance. Negative for confusion. The patient is not nervous/anxious.        Objective   Physical Exam  Vitals and nursing note reviewed.   Constitutional:       Appearance: Normal appearance. He is well-developed. He is not diaphoretic.   HENT:      Head: Normocephalic and atraumatic.      Right Ear: Tympanic membrane, ear canal and external ear normal.      Left Ear: Tympanic membrane, ear canal and external ear normal.   Eyes:      General: Lids are normal. No scleral icterus.     Extraocular Movements: Extraocular movements intact.      Conjunctiva/sclera: Conjunctivae normal.   Neck:      Thyroid: No thyroid mass or thyromegaly.      Vascular: No carotid bruit or JVD.   Cardiovascular:      Rate and Rhythm: Normal rate and regular rhythm.      Pulses: Normal pulses.           Radial pulses are 2+ on the right side and 2+ on the left side.      Heart sounds: Normal heart sounds. No murmur heard.  Pulmonary:      Effort: Pulmonary effort is normal. No respiratory distress.      Breath sounds: Normal breath sounds.   Abdominal:      Palpations: Abdomen is soft.   Musculoskeletal:      Cervical back: Normal range of motion.      Right lower leg: No edema.      Left lower leg: No edema.   Skin:     General: Skin is warm and dry.      Coloration: Skin is not pale.      Findings: No erythema or rash.   Neurological:      General: No focal deficit present.      Mental Status: He is alert and oriented to person, place, and time.      Sensory: No sensory deficit.      Deep Tendon Reflexes: Reflexes are normal and symmetric.   Psychiatric:         Mood and Affect: Mood normal.         Behavior: Behavior normal. Behavior is cooperative.         Thought Content: Thought content normal.         Judgment: Judgment normal.         Assessment & Plan   Diagnoses and  all orders for this visit:        . Annual physical exam  l    Continue attempts at healthy lifestyle calorie appropriate diet and regular physical activity  Patient educated regarding prevention of serious illness with immunizations recommended Shingrix  Patient educated regarding prevention of colon cancer with screening through colonoscopy   prostate health managed through urology  Follow-up ongoing management of chronic problems otherwise preventatively annually

## 2023-02-23 NOTE — PROGRESS NOTES
"Chief Complaint  follow up to diabetes, Medicare Wellness-subsequent, and Annual Exam    Subjective        Sen Paiz presents to [facility]  History of Present Illness  Follow-up for ongoing management diabetes with most recent testing showing out-of-control with elevated A1c to 9.3  Ozempic 2 mg weekly increased last couple months his morning blood sugars have been in the 1 10-1 40 range    He also complains of nasal dripping and hoarsness mostly clear no fever sweats or chills no specific precipitating events other than change in weather.  He has poor sleep and is never taking medication for he will get approximately 2 to 3 hours of sleep at night usually goes to bed around midnight wakes up at 5 in the morning to go to work he has trouble falling asleep and staying asleep  He had consultation with neurology was given some duloxetine for peripheral neuropathy did not seem to give him much benefit so he stopped taking it  Objective   Vital Signs:  /60 (BP Location: Right arm, Patient Position: Sitting, Cuff Size: Adult)   Pulse 95   Ht 185.4 cm (73\")   Wt 105 kg (232 lb 8 oz)   SpO2 95%   BMI 30.67 kg/m²   Estimated body mass index is 30.67 kg/m² as calculated from the following:    Height as of this encounter: 185.4 cm (73\").    Weight as of this encounter: 105 kg (232 lb 8 oz).             Physical Exam  Vitals and nursing note reviewed.   Constitutional:       Appearance: Normal appearance.   HENT:      Right Ear: Tympanic membrane, ear canal and external ear normal.      Left Ear: Tympanic membrane, ear canal and external ear normal.      Nose: Congestion and rhinorrhea present.      Comments: Clear drainage  Eyes:      General: No scleral icterus.  Cardiovascular:      Rate and Rhythm: Normal rate and regular rhythm.      Pulses: Normal pulses.      Heart sounds: Normal heart sounds.   Pulmonary:      Effort: Pulmonary effort is normal.      Breath sounds: Normal " breath sounds.   Musculoskeletal:      Right lower leg: No edema.      Left lower leg: No edema.   Neurological:      Mental Status: He is alert.   Psychiatric:         Mood and Affect: Mood normal.         Behavior: Behavior normal.         Thought Content: Thought content normal.         Judgment: Judgment normal.        Result Review :    Common labs    Common Labs 4/19/22 4/19/22 10/20/22 10/20/22 10/20/22 10/20/22    0932 0932 0942 0942 0942 0942   Glucose  147 (A) 159 (A)      BUN  19 16      Creatinine  0.78 0.72 (A)      Sodium  139 143      Potassium  4.3 5.2      Chloride  101 101      Calcium  9.3 9.7      Total Protein   6.2      Albumin  4.60 4.50      Total Bilirubin  0.6 0.5      Alkaline Phosphatase  70 91      AST (SGOT)  19 20      ALT (SGPT)  28 26      Total Cholesterol    107     Triglycerides    139     HDL Cholesterol    33 (A)     LDL Cholesterol     50     Hemoglobin A1C 7.80 (A)     9.30 (A)   Microalbumin, Urine     200.2    (A) Abnormal value       Comments are available for some flowsheets but are not being displayed.                        Assessment and Plan   Diagnoses and all orders for this visit:        1. Mixed hyperlipidemia    2. Benign essential hypertension  -     Comprehensive Metabolic Panel    3. Type 2 diabetes mellitus with diabetic neuropathy, without long-term current use of insulin (HCC)  -     Hemoglobin A1c  -     Comprehensive Metabolic Panel    4. Primary insomnia  -     zolpidem (AMBIEN) 5 MG tablet; Take 1 tablet by mouth At Night As Needed for Sleep.  Dispense: 30 tablet; Refill: 0    5. Rhinorrhea    Other orders  -     metFORMIN ER (GLUCOPHAGE-XR) 500 MG 24 hr tablet; Take 2 tablets by mouth 2 (Two) Times a Day.  Dispense: 360 tablet; Refill: 2    Rhinorrhea initiate trial of Flonase  Hyperlipidemia continue statin therapy  Primary insomnia initiate zolpidem         Follow Up   Return in about 6 months (around 8/23/2023), or if symptoms worsen or fail to  improve, for Recheck.  Patient was given instructions and counseling regarding his condition or for health maintenance advice. Please see specific information pulled into the AVS if appropriate.

## 2023-02-24 LAB
ALBUMIN SERPL-MCNC: 5.1 G/DL (ref 3.7–4.7)
ALBUMIN/GLOB SERPL: 2.6 {RATIO} (ref 1.2–2.2)
ALP SERPL-CCNC: 87 IU/L (ref 44–121)
ALT SERPL-CCNC: 29 IU/L (ref 0–44)
AST SERPL-CCNC: 20 IU/L (ref 0–40)
BILIRUB SERPL-MCNC: 0.8 MG/DL (ref 0–1.2)
BUN SERPL-MCNC: 18 MG/DL (ref 8–27)
BUN/CREAT SERPL: 24 (ref 10–24)
CALCIUM SERPL-MCNC: 9.9 MG/DL (ref 8.6–10.2)
CHLORIDE SERPL-SCNC: 97 MMOL/L (ref 96–106)
CO2 SERPL-SCNC: 27 MMOL/L (ref 20–29)
CREAT SERPL-MCNC: 0.76 MG/DL (ref 0.76–1.27)
EGFRCR SERPLBLD CKD-EPI 2021: 94 ML/MIN/1.73
GLOBULIN SER CALC-MCNC: 2 G/DL (ref 1.5–4.5)
GLUCOSE SERPL-MCNC: 154 MG/DL (ref 70–99)
HBA1C MFR BLD: 8.1 % (ref 4.8–5.6)
POTASSIUM SERPL-SCNC: 5.1 MMOL/L (ref 3.5–5.2)
PROT SERPL-MCNC: 7.1 G/DL (ref 6–8.5)
SODIUM SERPL-SCNC: 140 MMOL/L (ref 134–144)

## 2023-03-16 DIAGNOSIS — F51.01 PRIMARY INSOMNIA: ICD-10-CM

## 2023-03-16 DIAGNOSIS — E11.42 DIABETIC PERIPHERAL NEUROPATHY: ICD-10-CM

## 2023-03-17 RX ORDER — ZOLPIDEM TARTRATE 5 MG/1
5 TABLET ORAL NIGHTLY PRN
Qty: 30 TABLET | Refills: 1 | Status: SHIPPED | OUTPATIENT
Start: 2023-03-17

## 2023-03-17 RX ORDER — PREGABALIN 200 MG/1
CAPSULE ORAL
Qty: 60 CAPSULE | Refills: 1 | Status: SHIPPED | OUTPATIENT
Start: 2023-03-17

## 2023-03-27 RX ORDER — BLOOD SUGAR DIAGNOSTIC
STRIP MISCELLANEOUS
Qty: 100 EACH | Refills: 1 | Status: SHIPPED | OUTPATIENT
Start: 2023-03-27

## 2023-04-13 RX ORDER — IRBESARTAN 300 MG/1
300 TABLET ORAL NIGHTLY
Qty: 90 TABLET | Refills: 0 | Status: SHIPPED | OUTPATIENT
Start: 2023-04-13

## 2023-05-10 RX ORDER — INSULIN GLARGINE 100 [IU]/ML
INJECTION, SOLUTION SUBCUTANEOUS
Qty: 21 ML | Refills: 0 | Status: SHIPPED | OUTPATIENT
Start: 2023-05-10

## 2023-05-16 DIAGNOSIS — E11.42 DIABETIC PERIPHERAL NEUROPATHY: ICD-10-CM

## 2023-05-16 RX ORDER — PREGABALIN 200 MG/1
CAPSULE ORAL
Qty: 60 CAPSULE | Refills: 2 | Status: SHIPPED | OUTPATIENT
Start: 2023-05-16

## 2023-05-16 RX ORDER — FLURBIPROFEN SODIUM 0.3 MG/ML
SOLUTION/ DROPS OPHTHALMIC
Qty: 100 EACH | Refills: 3 | Status: SHIPPED | OUTPATIENT
Start: 2023-05-16

## 2023-05-16 RX ORDER — LANCETS
EACH MISCELLANEOUS
Qty: 306 EACH | Refills: 3 | Status: SHIPPED | OUTPATIENT
Start: 2023-05-16

## 2023-05-16 NOTE — TELEPHONE ENCOUNTER
Rx Refill Note  Requested Prescriptions     Pending Prescriptions Disp Refills   • pregabalin (LYRICA) 200 MG capsule [Pharmacy Med Name: PREGABALIN 200MG CAPSULES] 60 capsule      Sig: TAKE 1 CAPSULE BY MOUTH TWICE DAILY   • Accu-Chek FastClix Lancets misc [Pharmacy Med Name: ACCU-CHEK FASTCLIX LANCETS 102'S] 306 each 3     Sig: CHECK FSBS TWICE DAILY   • B-D UF III MINI PEN NEEDLES 31G X 5 MM misc [Pharmacy Med Name: B-D PEN NDL MINI [id number](3/16)PRPL] 100 each 0     Sig: AS DIRECTED EVERY DAY      Last office visit with prescribing clinician: 2/23/2023   Last telemedicine visit with prescribing clinician: 5/10/2023   Next office visit with prescribing clinician: Visit date not found   Ivy Richards MA  05/16/23, 13:39 EDT

## 2023-05-26 DIAGNOSIS — F51.01 PRIMARY INSOMNIA: ICD-10-CM

## 2023-05-26 RX ORDER — ZOLPIDEM TARTRATE 5 MG/1
5 TABLET ORAL NIGHTLY PRN
Qty: 30 TABLET | Refills: 1 | Status: SHIPPED | OUTPATIENT
Start: 2023-05-26

## 2023-07-23 DIAGNOSIS — F51.01 PRIMARY INSOMNIA: ICD-10-CM

## 2023-07-26 RX ORDER — ZOLPIDEM TARTRATE 5 MG/1
5 TABLET ORAL NIGHTLY PRN
Qty: 30 TABLET | Refills: 1 | Status: SHIPPED | OUTPATIENT
Start: 2023-07-26 | End: 2023-07-27 | Stop reason: SDUPTHER

## 2023-07-27 DIAGNOSIS — F51.01 PRIMARY INSOMNIA: ICD-10-CM

## 2023-07-27 RX ORDER — ZOLPIDEM TARTRATE 5 MG/1
5 TABLET ORAL NIGHTLY PRN
Qty: 30 TABLET | Refills: 1 | Status: SHIPPED | OUTPATIENT
Start: 2023-07-27

## 2023-08-10 RX ORDER — INSULIN GLARGINE 100 [IU]/ML
INJECTION, SOLUTION SUBCUTANEOUS
Qty: 21 ML | Refills: 0 | Status: SHIPPED | OUTPATIENT
Start: 2023-08-10

## 2023-08-11 RX ORDER — ATORVASTATIN CALCIUM 40 MG/1
40 TABLET, FILM COATED ORAL DAILY
Qty: 90 TABLET | Refills: 1 | Status: SHIPPED | OUTPATIENT
Start: 2023-08-11

## 2023-08-14 DIAGNOSIS — E11.42 DIABETIC PERIPHERAL NEUROPATHY: ICD-10-CM

## 2023-08-15 RX ORDER — PREGABALIN 200 MG/1
CAPSULE ORAL
Qty: 60 CAPSULE | Refills: 1 | Status: SHIPPED | OUTPATIENT
Start: 2023-08-15

## 2023-09-29 RX ORDER — IRBESARTAN 300 MG/1
300 TABLET ORAL NIGHTLY
Qty: 90 TABLET | Refills: 0 | Status: SHIPPED | OUTPATIENT
Start: 2023-09-29

## 2023-10-08 DIAGNOSIS — E11.40 TYPE 2 DIABETES MELLITUS WITH DIABETIC NEUROPATHY, WITHOUT LONG-TERM CURRENT USE OF INSULIN: ICD-10-CM

## 2023-10-10 DIAGNOSIS — E11.40 TYPE 2 DIABETES MELLITUS WITH DIABETIC NEUROPATHY, WITHOUT LONG-TERM CURRENT USE OF INSULIN: ICD-10-CM

## 2023-10-10 RX ORDER — IRBESARTAN 300 MG/1
300 TABLET ORAL NIGHTLY
Qty: 90 TABLET | Refills: 0 | Status: SHIPPED | OUTPATIENT
Start: 2023-10-10

## 2023-10-10 RX ORDER — EMPAGLIFLOZIN 25 MG/1
TABLET, FILM COATED ORAL
Qty: 90 TABLET | Refills: 0 | Status: SHIPPED | OUTPATIENT
Start: 2023-10-10

## 2023-10-10 RX ORDER — SEMAGLUTIDE 2.68 MG/ML
INJECTION, SOLUTION SUBCUTANEOUS
Qty: 12 ML | Refills: 0 | OUTPATIENT
Start: 2023-10-10

## 2023-10-13 ENCOUNTER — TELEPHONE (OUTPATIENT)
Dept: INTERNAL MEDICINE | Facility: CLINIC | Age: 78
End: 2023-10-13

## 2023-10-13 DIAGNOSIS — E11.42 DIABETIC PERIPHERAL NEUROPATHY: ICD-10-CM

## 2023-10-13 NOTE — TELEPHONE ENCOUNTER
Caller: Sen Paiz    Relationship to patient: Self    Best call back number: [phone] (Home)     Patient is needing: PLEASE CONTACT PATIENT TO ADVISE.          THANKS

## 2023-10-16 ENCOUNTER — TELEPHONE (OUTPATIENT)
Dept: INTERNAL MEDICINE | Facility: CLINIC | Age: 78
End: 2023-10-16
Payer: MEDICARE

## 2023-10-16 DIAGNOSIS — E11.42 DIABETIC PERIPHERAL NEUROPATHY: ICD-10-CM

## 2023-10-16 RX ORDER — PREGABALIN 200 MG/1
CAPSULE ORAL
Qty: 60 CAPSULE | OUTPATIENT
Start: 2023-10-16

## 2023-10-16 RX ORDER — PREGABALIN 200 MG/1
200 CAPSULE ORAL 2 TIMES DAILY
Qty: 60 CAPSULE | Refills: 1 | Status: SHIPPED | OUTPATIENT
Start: 2023-10-16

## 2023-10-16 NOTE — TELEPHONE ENCOUNTER
Patient walked in requesting a refill on Lyrica 200 MG. He has an appointment coming up be stated he was in pain and he was out. Pharmacy was confirmed [facility] - [address] if approved.     Thank you.

## 2023-10-17 ENCOUNTER — LAB (OUTPATIENT)
Dept: LAB | Facility: HOSPITAL | Age: 78
End: 2023-10-17
Payer: MEDICARE

## 2023-10-17 PROCEDURE — 83036 HEMOGLOBIN GLYCOSYLATED A1C: CPT | Performed by: FAMILY MEDICINE

## 2023-10-19 ENCOUNTER — OFFICE VISIT (OUTPATIENT)
Dept: INTERNAL MEDICINE | Facility: CLINIC | Age: 78
End: 2023-10-19
Payer: MEDICARE

## 2023-10-19 VITALS
HEIGHT: 73 IN | BODY MASS INDEX: 29.55 KG/M2 | TEMPERATURE: 97.2 F | OXYGEN SATURATION: 95 % | WEIGHT: 223 LBS | HEART RATE: 77 BPM | DIASTOLIC BLOOD PRESSURE: 62 MMHG | SYSTOLIC BLOOD PRESSURE: 112 MMHG

## 2023-10-19 DIAGNOSIS — I10 BENIGN ESSENTIAL HYPERTENSION: ICD-10-CM

## 2023-10-19 DIAGNOSIS — E11.40 TYPE 2 DIABETES MELLITUS WITH DIABETIC NEUROPATHY, WITHOUT LONG-TERM CURRENT USE OF INSULIN: Primary | ICD-10-CM

## 2023-10-19 DIAGNOSIS — E78.2 MIXED HYPERLIPIDEMIA: ICD-10-CM

## 2023-10-19 DIAGNOSIS — F51.01 PRIMARY INSOMNIA: ICD-10-CM

## 2023-10-19 PROBLEM — A41.9 SEVERE SEPSIS: Status: RESOLVED | Noted: 2019-12-12 | Resolved: 2023-10-19

## 2023-10-19 PROBLEM — R65.20 SEVERE SEPSIS: Status: RESOLVED | Noted: 2019-12-12 | Resolved: 2023-10-19

## 2023-10-19 PROBLEM — J10.1 INFLUENZA A: Status: RESOLVED | Noted: 2019-12-12 | Resolved: 2023-10-19

## 2023-10-19 RX ORDER — OMEGA-3 FATTY ACIDS/FISH OIL 300-1000MG
CAPSULE ORAL
COMMUNITY

## 2023-10-19 RX ORDER — INSULIN GLARGINE 100 [IU]/ML
INJECTION, SOLUTION SUBCUTANEOUS
Qty: 15 ML | Refills: 1 | Status: SHIPPED | OUTPATIENT
Start: 2023-10-19

## 2023-10-19 NOTE — PROGRESS NOTES
"Chief Complaint  Diabetes    Subjective        Sen Paiz presents to [facility]  History of Present Illness  Patient follows up for ongoing management of diabetes with elevated A1c he has been taking Ozempic for months without any weight loss or hepatic effect on A1c he says a fairly expensive it does not really seem to be doing metformin like to stop taking it underlying hyperlipidemia lipidemia is treated with atorvastatin sleep is much improved with zolpidem  Patient quite a few questions regarding his diabetes and ongoing treatment of neuropathy as well as fatigue and results of A1c regarding his blood sugars testing that he has at home  Objective   Vital Signs:  /62   Pulse 77   Temp 97.2 °F (36.2 °C)   Ht 185.4 cm (72.99\")   Wt 101 kg (223 lb)   SpO2 95%   BMI 29.43 kg/m²   Estimated body mass index is 29.43 kg/m² as calculated from the following:    Height as of this encounter: 185.4 cm (72.99\").    Weight as of this encounter: 101 kg (223 lb).               Physical Exam  Constitutional:       Appearance: Normal appearance.   HENT:      Head: Normocephalic and atraumatic.   Cardiovascular:      Rate and Rhythm: Normal rate and regular rhythm.      Pulses: Normal pulses.      Heart sounds: Normal heart sounds.   Musculoskeletal:      Right lower leg: No edema.      Left lower leg: No edema.   Skin:     General: Skin is warm and dry.   Neurological:      Mental Status: He is alert.   Psychiatric:         Mood and Affect: Mood normal.         Behavior: Behavior normal.         Thought Content: Thought content normal.         Judgment: Judgment normal.        Result Review :    Common labs          2/23/2023    13:33 10/17/2023    10:34   Common Labs   Glucose 154     BUN 18     Creatinine 0.76     Sodium 140     Potassium 5.1     Chloride 97     Calcium 9.9     Total Protein 7.1     Albumin 5.1     Total Bilirubin 0.8     Alkaline Phosphatase 87     AST (SGOT) 20 "     ALT (SGPT) 29     Hemoglobin A1C 8.1  8.80        A1C Last 3 Results          2/23/2023    13:33 10/17/2023    10:34   HGBA1C Last 3 Results   Hemoglobin A1C 8.1  8.80                   Assessment and Plan   Diagnoses and all orders for this visit:    1. Type 2 diabetes mellitus with diabetic neuropathy, without long-term current use of insulin (Primary)    2. Benign essential hypertension    3. Mixed hyperlipidemia    4. Primary insomnia    Other orders  -     Insulin Glargine (Lantus SoloStar) 100 UNIT/ML injection pen; 26 UNITS HS INCREASE 3 UNITS EVERY 3 DAYS UNTIL MORNING BLOOD SUGAR IS LES THAN 120  Dispense: 15 mL; Refill: 1    Hyperlipidemia continue atorvastatin  Insomnia continue zolpidem  Hypertension continue irbesartan  Diabetes discontinue Ozempic  Patient will call in 1 month update dosing of insulin and morning blood sugars     I spent 45 minutes caring for Sen on this date of service. This time includes time spent by me in the following activities:reviewing tests, performing a medically appropriate examination and/or evaluation , counseling and educating the patient/family/caregiver, ordering medications, tests, or procedures, documenting information in the medical record, and independently interpreting results and communicating that information with the patient/family/caregiver  Follow Up   Return in about 3 months (around 1/19/2024), or if symptoms worsen or fail to improve, for Recheck.  Patient was given instructions and counseling regarding his condition or for health maintenance advice. Please see specific information pulled into the AVS if appropriate.

## 2023-10-25 DIAGNOSIS — F51.01 PRIMARY INSOMNIA: ICD-10-CM

## 2023-10-25 RX ORDER — ZOLPIDEM TARTRATE 5 MG/1
5 TABLET ORAL NIGHTLY PRN
Qty: 30 TABLET | Refills: 2 | Status: SHIPPED | OUTPATIENT
Start: 2023-10-25

## 2023-11-27 RX ORDER — BLOOD SUGAR DIAGNOSTIC
STRIP MISCELLANEOUS SEE ADMIN INSTRUCTIONS
Qty: 100 EACH | Refills: 11 | Status: SHIPPED | OUTPATIENT
Start: 2023-11-27

## 2023-12-04 ENCOUNTER — TELEPHONE (OUTPATIENT)
Dept: INTERNAL MEDICINE | Facility: CLINIC | Age: 78
End: 2023-12-04

## 2023-12-04 NOTE — TELEPHONE ENCOUNTER
Caller: Sen Paiz    Relationship: Self    Best call back number: [phone]     Which medication are you concerned about: INSULIN    HE WAS TOLD TO INCREASE 3 UNITS EVERY 3 DAYS UNTIL BLOOD SUGAR IS LESS THAN 120.    HE IS NOW AT 49 UNITS, WITHOUT HAVING REACHED GOAL OF LESS THAN 120.    HE WOULD LIKE A RETURN CALL TO ADVISE HOW HE SHOULD PROCEED.    HE WOULD VERY MUCH APPRECIATE A CALL TODAY 12-4-23 IF POSSIBLE.

## 2023-12-05 RX ORDER — INSULIN GLARGINE 100 [IU]/ML
50 INJECTION, SOLUTION SUBCUTANEOUS NIGHTLY
Start: 2023-12-05

## 2023-12-14 DIAGNOSIS — E11.42 DIABETIC PERIPHERAL NEUROPATHY: ICD-10-CM

## 2023-12-15 RX ORDER — PREGABALIN 200 MG/1
200 CAPSULE ORAL 2 TIMES DAILY
Qty: 60 CAPSULE | Refills: 0 | Status: SHIPPED | OUTPATIENT
Start: 2023-12-15

## 2023-12-15 NOTE — TELEPHONE ENCOUNTER
Rx Refill Note  Requested Prescriptions     Pending Prescriptions Disp Refills    pregabalin (LYRICA) 200 MG capsule [Pharmacy Med Name: PREGABALIN 200MG CAPSULES] 60 capsule 0     Sig: TAKE 1 CAPSULE BY MOUTH TWICE DAILY      Last office visit with prescribing clinician: 10/19/2023   Last telemedicine visit with prescribing clinician: Visit date not found   Next office visit with prescribing clinician: 12/19/2023                         Would you like a call back once the refill request has been completed: [] Yes [] No    If the office needs to give you a call back, can they leave a voicemail: [] Yes [] No    Jacinda Dias MA  12/15/23, 14:25 EST

## 2023-12-19 ENCOUNTER — OFFICE VISIT (OUTPATIENT)
Dept: INTERNAL MEDICINE | Facility: CLINIC | Age: 78
End: 2023-12-19
Payer: MEDICARE

## 2023-12-19 VITALS
HEART RATE: 88 BPM | TEMPERATURE: 97.4 F | BODY MASS INDEX: 30.89 KG/M2 | HEIGHT: 73 IN | DIASTOLIC BLOOD PRESSURE: 56 MMHG | OXYGEN SATURATION: 95 % | SYSTOLIC BLOOD PRESSURE: 108 MMHG | WEIGHT: 233.1 LBS

## 2023-12-19 DIAGNOSIS — F51.01 PRIMARY INSOMNIA: Primary | ICD-10-CM

## 2023-12-19 DIAGNOSIS — E78.2 MIXED HYPERLIPIDEMIA: ICD-10-CM

## 2023-12-19 DIAGNOSIS — E11.40 TYPE 2 DIABETES MELLITUS WITH DIABETIC NEUROPATHY, WITHOUT LONG-TERM CURRENT USE OF INSULIN: ICD-10-CM

## 2023-12-19 DIAGNOSIS — I10 BENIGN ESSENTIAL HYPERTENSION: ICD-10-CM

## 2023-12-19 PROCEDURE — 3078F DIAST BP <80 MM HG: CPT | Performed by: FAMILY MEDICINE

## 2023-12-19 PROCEDURE — 3074F SYST BP LT 130 MM HG: CPT | Performed by: FAMILY MEDICINE

## 2023-12-19 PROCEDURE — 99214 OFFICE O/P EST MOD 30 MIN: CPT | Performed by: FAMILY MEDICINE

## 2023-12-19 RX ORDER — INSULIN GLARGINE 100 [IU]/ML
50 INJECTION, SOLUTION SUBCUTANEOUS NIGHTLY
Qty: 45 ML | Refills: 2 | Status: SHIPPED | OUTPATIENT
Start: 2023-12-19

## 2023-12-19 NOTE — PROGRESS NOTES
"Chief Complaint  Diabetes    Subjective        Sen Paiz presents to [facility] PRIMARY CARE  History of Present Illness  Follows up for ongoing management diabetes hypertension hyperlipidemia he has had some trouble but is weaned up on his Lantus to 50 units daily still having some morning blood sugars occasionally greater than 120.  Sometimes the insulin injects easily sometimes it does not has not been moving the injection site around  He is still taking metformin as well  Pregabalin does not seem to be helping his feet very much as he suffers from significant neuropathy and has had follow-up with podiatry  Objective   Vital Signs:  /56   Pulse 88   Temp 97.4 °F (36.3 °C)   Ht 185.4 cm (72.99\")   Wt 106 kg (233 lb 1.6 oz)   SpO2 95%   BMI 30.76 kg/m²   Estimated body mass index is 30.76 kg/m² as calculated from the following:    Height as of this encounter: 185.4 cm (72.99\").    Weight as of this encounter: 106 kg (233 lb 1.6 oz).               Physical Exam  Vitals and nursing note reviewed.   Constitutional:       Appearance: Normal appearance.   Cardiovascular:      Rate and Rhythm: Normal rate and regular rhythm.      Pulses: Normal pulses.      Heart sounds: Normal heart sounds.   Pulmonary:      Effort: Pulmonary effort is normal.   Neurological:      Mental Status: He is alert.   Psychiatric:         Mood and Affect: Mood normal.         Behavior: Behavior normal.         Thought Content: Thought content normal.         Judgment: Judgment normal.        Result Review :                   Assessment and Plan   Diagnoses and all orders for this visit:    1. Primary insomnia (Primary)    2. Benign essential hypertension    3. Mixed hyperlipidemia    4. Type 2 diabetes mellitus with diabetic neuropathy, without long-term current use of insulin  -     Microalbumin / Creatinine Urine Ratio - Urine, Clean Catch  -     Hemoglobin A1c  -     Comprehensive Metabolic Panel    Other " orders  -     Insulin Glargine (Lantus SoloStar) 100 UNIT/ML injection pen; Inject 50 Units under the skin into the appropriate area as directed Every Night.  Dispense: 45 mL; Refill: 2  -     Dulaglutide 0.75 MG/0.5ML solution pen-injector; Inject 0.75 mg under the skin into the appropriate area as directed 1 (One) Time Per Week.  Dispense: 2 mL; Refill: 1    Hypertension monitor blood pressure  Hyperlipidemia continue atorvastatin as well as beneficial medication for diabetes  Diabetes and Trulicity for better A1c control and sugar controls  He will adjust his Lantus if necessary presently at 50 units daily if reported readings in the morning less than 80         Follow Up   Return in about 3 months (around 3/19/2024), or if symptoms worsen or fail to improve, for Recheck.  Patient was given instructions and counseling regarding his condition or for health maintenance advice. Please see specific information pulled into the AVS if appropriate.         Answers submitted by the patient for this visit:  Primary Reason for Visit (Submitted on 12/12/2023)  What is the primary reason for your visit?: Diabetes

## 2023-12-20 LAB
ALBUMIN SERPL-MCNC: 4.9 G/DL (ref 3.8–4.8)
ALBUMIN/CREAT UR: 305 MG/G CREAT (ref 0–29)
ALBUMIN/GLOB SERPL: 2.5 {RATIO} (ref 1.2–2.2)
ALP SERPL-CCNC: 97 IU/L (ref 44–121)
ALT SERPL-CCNC: 31 IU/L (ref 0–44)
AST SERPL-CCNC: 21 IU/L (ref 0–40)
BILIRUB SERPL-MCNC: 0.6 MG/DL (ref 0–1.2)
BUN SERPL-MCNC: 15 MG/DL (ref 8–27)
BUN/CREAT SERPL: 18 (ref 10–24)
CALCIUM SERPL-MCNC: 10.1 MG/DL (ref 8.6–10.2)
CHLORIDE SERPL-SCNC: 99 MMOL/L (ref 96–106)
CO2 SERPL-SCNC: 28 MMOL/L (ref 20–29)
CREAT SERPL-MCNC: 0.82 MG/DL (ref 0.76–1.27)
CREAT UR-MCNC: 50.9 MG/DL
EGFRCR SERPLBLD CKD-EPI 2021: 91 ML/MIN/1.73
GLOBULIN SER CALC-MCNC: 2 G/DL (ref 1.5–4.5)
GLUCOSE SERPL-MCNC: 106 MG/DL (ref 70–99)
HBA1C MFR BLD: 9.4 % (ref 4.8–5.6)
MICROALBUMIN UR-MCNC: 155.4 UG/ML
POTASSIUM SERPL-SCNC: 4.4 MMOL/L (ref 3.5–5.2)
PROT SERPL-MCNC: 6.9 G/DL (ref 6–8.5)
SODIUM SERPL-SCNC: 141 MMOL/L (ref 134–144)

## 2023-12-22 DIAGNOSIS — E11.40 TYPE 2 DIABETES MELLITUS WITH DIABETIC NEUROPATHY, WITHOUT LONG-TERM CURRENT USE OF INSULIN: Primary | ICD-10-CM

## 2023-12-22 RX ORDER — METFORMIN HYDROCHLORIDE 500 MG/1
1000 TABLET, EXTENDED RELEASE ORAL 2 TIMES DAILY
Qty: 360 TABLET | Refills: 2 | Status: SHIPPED | OUTPATIENT
Start: 2023-12-22

## 2024-01-03 ENCOUNTER — TELEPHONE (OUTPATIENT)
Dept: ENDOCRINOLOGY | Age: 79
End: 2024-01-03

## 2024-01-03 NOTE — TELEPHONE ENCOUNTER
Caller: Sen Paiz    Relationship to patient: Self    Best call back number: [phone]    Chief complaint:     Type of visit: NEW PT    Requested date: 1/3/24     If rescheduling, when is the original appointment:     Additional notes:PT IS SCHEDULED FOR 3/11 WITH AFSHAN NUÑEZ BUT WOULD LIKE AN EARLIER APPT. I SEE THAT WE HAVE AN APPT FOR 1/3 AT 4:30 PT WOUL;D LIKE TO HAVE THAT TIME IF AT ALL POSSIBLE. PLEASE CALL PT TO ADVISE .

## 2024-01-03 NOTE — TELEPHONE ENCOUNTER
Hub staff attempted to follow warm transfer process and was unsuccessful     Caller: Sen Paiz    Relationship to patient: Self    Best call back number: [phone] (home)      Patient is needing: PATIENT MISSED CALL AND CALLED BACK, UNABLE TO TRANSFER CALL. PLEASE TRY TO CALL HIM BACK AGAIN, HE DEFINITELY CAN DO THE FRIDAY (1/5/24) AT 1PM APPOINTMENT TIME-SLOT. THE HUB ATTEMPTED TO SCHEDULE, BUT THE APPOINTMENT TIME WASN'T THERE. THANKS!

## 2024-01-05 ENCOUNTER — OFFICE VISIT (OUTPATIENT)
Dept: ENDOCRINOLOGY | Age: 79
End: 2024-01-05
Payer: MEDICARE

## 2024-01-05 VITALS
DIASTOLIC BLOOD PRESSURE: 80 MMHG | TEMPERATURE: 97.1 F | WEIGHT: 234.2 LBS | HEIGHT: 73 IN | OXYGEN SATURATION: 97 % | BODY MASS INDEX: 31.04 KG/M2 | SYSTOLIC BLOOD PRESSURE: 130 MMHG | HEART RATE: 94 BPM

## 2024-01-05 DIAGNOSIS — Z79.4 TYPE 2 DIABETES MELLITUS WITH HYPERGLYCEMIA, WITH LONG-TERM CURRENT USE OF INSULIN: Primary | ICD-10-CM

## 2024-01-05 DIAGNOSIS — E11.42 DIABETIC PERIPHERAL NEUROPATHY: ICD-10-CM

## 2024-01-05 DIAGNOSIS — E11.65 TYPE 2 DIABETES MELLITUS WITH HYPERGLYCEMIA, WITH LONG-TERM CURRENT USE OF INSULIN: Primary | ICD-10-CM

## 2024-01-05 PROCEDURE — 1160F RVW MEDS BY RX/DR IN RCRD: CPT | Performed by: NURSE PRACTITIONER

## 2024-01-05 PROCEDURE — 1159F MED LIST DOCD IN RCRD: CPT | Performed by: NURSE PRACTITIONER

## 2024-01-05 PROCEDURE — 99203 OFFICE O/P NEW LOW 30 MIN: CPT | Performed by: NURSE PRACTITIONER

## 2024-01-05 PROCEDURE — 3075F SYST BP GE 130 - 139MM HG: CPT | Performed by: NURSE PRACTITIONER

## 2024-01-05 PROCEDURE — 3079F DIAST BP 80-89 MM HG: CPT | Performed by: NURSE PRACTITIONER

## 2024-01-05 NOTE — PROGRESS NOTES
"Chief Complaint  Diabetes (Type 2: Pt doesn't have meter today, checks bs once a day, is up to date on eye exam, no hx of retinopathy, mild neuropathy. )    Subjective        Sen Paiz presents to [facility] ENDOCRINOLOGY  History of Present Illness    Patient is here for a new patient visit     I have reviewed PMH, allergies and medications UTD at this visit      Smoking cessation 4 years ago   Insulin started around the end of 2021/ early 2022    At one point he was on farxiga and jardiance at the same time      Ozempic was used in the past but lead to nausea, weight gain, muscle cramps and stomach aches- never helped with A1c so it was stopped    PCP did not tell him about this referral and he is somewhat surprised he was sent to us for evaluation. Trulicity was just added 2 weeks ago     12/2019- most likely had covid at this time and was in the ICU, never needed ventilation. After this to improve the resulting weakness, he started going to the gym. Has not been to the gym in over a year     Personal Goal A1c around 7%    He enjoys eating with family and friends and does not want to make significant dietary changes     Type 2 dm    Diagnosed about 20+ years ago, started on metformin at that time    Today in clinic pt reports being on metformin 1000mg BID, Jardiance 25 mg, lantus 45u daily- dose was decreased when started on trulicity   Trulicity was started 12/20/2023- tomorrow will be his 3rd injection   FBG - 140-170  Pre meals - does not check more than once in the morning   + muscle weakness and fatigue  Dm neuropathy - yes, podiatry manages toenail care   Episodes of hypoglycemia - no  \" I don't exercise like I use to\"  Verbalizes his diet contributes to his blood sugar control   On Ace inb.    Objective   Vital Signs:  /80   Pulse 94   Temp 97.1 °F (36.2 °C) (Temporal)   Ht 185.4 cm (72.99\")   Wt 106 kg (234 lb 3.2 oz)   SpO2 97%   BMI 30.91 kg/m²   Estimated body mass " "index is 30.91 kg/m² as calculated from the following:    Height as of this encounter: 185.4 cm (72.99\").    Weight as of this encounter: 106 kg (234 lb 3.2 oz).           Physical Exam  Vitals reviewed.   Constitutional:       General: He is not in acute distress.  HENT:      Head: Normocephalic and atraumatic.   Cardiovascular:      Rate and Rhythm: Normal rate.   Pulmonary:      Effort: Pulmonary effort is normal. No respiratory distress.   Musculoskeletal:         General: No signs of injury. Normal range of motion.      Cervical back: Normal range of motion and neck supple.   Skin:     General: Skin is warm and dry.   Neurological:      Mental Status: He is alert and oriented to person, place, and time. Mental status is at baseline.   Psychiatric:         Mood and Affect: Mood normal.         Behavior: Behavior normal.         Thought Content: Thought content normal.         Judgment: Judgment normal.        Result Review :  The following data was reviewed by: CHRISTINA Forrester on 01/05/2024:  Common labs          2/23/2023    13:33 10/17/2023    10:34 12/19/2023    14:11   Common Labs   Glucose 154   106    BUN 18   15    Creatinine 0.76   0.82    Sodium 140   141    Potassium 5.1   4.4    Chloride 97   99    Calcium 9.9   10.1    Total Protein 7.1   6.9    Albumin 5.1   4.9    Total Bilirubin 0.8   0.6    Alkaline Phosphatase 87   97    AST (SGOT) 20   21    ALT (SGPT) 29   31    Hemoglobin A1C 8.1  8.80  9.4    Microalbumin, Urine   155.4                   Assessment and Plan   Diagnoses and all orders for this visit:    1. Type 2 diabetes mellitus with hyperglycemia, with long-term current use of insulin (Primary)    2. Diabetic peripheral neuropathy             Follow Up   No follow-ups on file.    Not interested in cgm, recommended to check during the day and at night at times also to know where BS are during the day, not just fasting in the morning  Would recommend goal fasting BS to be < 140, " discussed adjusting lantus dosing based on these results  He is not sure if he will continue to follow with us for DM regimen so making medication changes without follow up would not be safe  Would recommend getting to highest tolerated glp-1 dose and reassessing A1c, if not at goal would recommend basal/bolus insulin regimen which was briefly discussed with patient     Patient was given instructions and counseling regarding his condition or for health maintenance advice. Please see specific information pulled into the AVS if appropriate.       Susie Villela APRN

## 2024-01-08 DIAGNOSIS — E11.40 TYPE 2 DIABETES MELLITUS WITH DIABETIC NEUROPATHY, WITHOUT LONG-TERM CURRENT USE OF INSULIN: ICD-10-CM

## 2024-01-08 RX ORDER — EMPAGLIFLOZIN 25 MG/1
TABLET, FILM COATED ORAL
Qty: 90 TABLET | Refills: 0 | Status: SHIPPED | OUTPATIENT
Start: 2024-01-08

## 2024-01-08 NOTE — TELEPHONE ENCOUNTER
Rx Refill Note  Requested Prescriptions     Pending Prescriptions Disp Refills    Jardiance 25 MG tablet tablet [Pharmacy Med Name: JARDIANCE 25MG TABLETS] 90 tablet 0     Sig: TAKE 1 TABLET BY MOUTH DAILY      Last office visit with prescribing clinician: 12/19/2023   Last telemedicine visit with prescribing clinician: Visit date not found   Next office visit with prescribing clinician: Visit date not found                         Would you like a call back once the refill request has been completed: [] Yes [] No    If the office needs to give you a call back, can they leave a voicemail: [] Yes [] No    Jacinda Dias MA  01/08/24, 08:59 EST

## 2024-01-11 DIAGNOSIS — E11.42 DIABETIC PERIPHERAL NEUROPATHY: ICD-10-CM

## 2024-01-15 RX ORDER — PREGABALIN 200 MG/1
200 CAPSULE ORAL 2 TIMES DAILY
Qty: 60 CAPSULE | Refills: 2 | Status: SHIPPED | OUTPATIENT
Start: 2024-01-15

## 2024-01-15 NOTE — TELEPHONE ENCOUNTER
Caller: Sen Paiz    Relationship: Self    Best call back number: [phone]    Requested Prescriptions:   Requested Prescriptions     Pending Prescriptions Disp Refills    pregabalin (LYRICA) 200 MG capsule [Pharmacy Med Name: PREGABALIN 200MG CAPSULES] 60 capsule      Sig: TAKE 1 CAPSULE BY MOUTH TWICE DAILY    Dulaglutide 0.75 MG/0.5ML solution pen-injector 2 mL 1     Sig: Inject 0.75 mg under the skin into the appropriate area as directed 1 (One) Time Per Week.        Pharmacy where request should be sent: [id number] [id number] AT [facility] & [facility] - [phone] [facility] [phone]      Last office visit with prescribing clinician: Visit date not found   Last telemedicine visit with prescribing clinician: Visit date not found   Next office visit with prescribing clinician: Visit date not found     Additional details provided by patient: PATIENT STATED HE IS COMPLETELY OUT OF PREGABALIN    PATIENT STATED [employer] IS WORKING AND HIS AIC WAS 5.2 THIS MORNING.    PATIENT STATED THAT NEW INSURANCE MAY BE REQUESTING A PRIOR AUTH PATIENT UNSURE AS TO WHY HE IS NOT ABLE TO GET THIS FILLED.   PHARMACY STATED IT WAS ON HOLD AND THEY ARE OUT OF STOCK.    [facility] TOLD PATIENT TO REACH OUT TO DR GALAVIZ.    PATIENT DOES NOT HAVE HIS NEXT DOSE FOR THIS SATURDAY.    Does the patient have less than a 3 day supply:  [x] Yes  [] No    Would you like a call back once the refill request has been completed: [] Yes [x] No    If the office needs to give you a call back, can they leave a voicemail: [] Yes [x] No    Judi Cantu Rep   01/15/24 13:41 EST

## 2024-01-17 ENCOUNTER — TRANSCRIBE ORDERS (OUTPATIENT)
Dept: ADMINISTRATIVE | Facility: HOSPITAL | Age: 79
End: 2024-01-17
Payer: MEDICARE

## 2024-01-17 ENCOUNTER — PRIOR AUTHORIZATION (OUTPATIENT)
Dept: INTERNAL MEDICINE | Facility: CLINIC | Age: 79
End: 2024-01-17
Payer: MEDICARE

## 2024-01-17 DIAGNOSIS — Z12.2 SCREENING FOR LUNG CANCER: Primary | ICD-10-CM

## 2024-01-17 NOTE — TELEPHONE ENCOUNTER
Currently working on PA. Will call patient once we have a response back from insurance. OKAY FOR HUB TO RELAY.

## 2024-01-18 DIAGNOSIS — F51.01 PRIMARY INSOMNIA: ICD-10-CM

## 2024-01-19 RX ORDER — ZOLPIDEM TARTRATE 5 MG/1
5 TABLET ORAL NIGHTLY PRN
Qty: 30 TABLET | Refills: 2 | Status: SHIPPED | OUTPATIENT
Start: 2024-01-19

## 2024-01-19 NOTE — TELEPHONE ENCOUNTER
Name: Sen Paiz    Relationship: Self    Best Callback Number: [phone]    HUB PROVIDED THE RELAY MESSAGE FROM THE OFFICE   PATIENT HAS FURTHER QUESTIONS AND WOULD LIKE A CALL BACK AT THE FOLLOWING PHONE NUMBER [phone]    ADDITIONAL INFORMATION:  HE IS DUE FOR HIS SHOT OF TRULICITY TOMORROW. BUT DOES NOT HAVE ANY MEDICATION.     HE WOULD LIKE A CALL WITH A STATUS UPDATE.

## 2024-02-07 RX ORDER — ATORVASTATIN CALCIUM 40 MG/1
40 TABLET, FILM COATED ORAL DAILY
Qty: 90 TABLET | Refills: 1 | Status: SHIPPED | OUTPATIENT
Start: 2024-02-07

## 2024-03-11 ENCOUNTER — TELEPHONE (OUTPATIENT)
Dept: INTERNAL MEDICINE | Facility: CLINIC | Age: 79
End: 2024-03-11
Payer: MEDICARE

## 2024-03-11 RX ORDER — DULAGLUTIDE 0.75 MG/.5ML
INJECTION, SOLUTION SUBCUTANEOUS
Qty: 2 ML | Refills: 1 | OUTPATIENT
Start: 2024-03-11

## 2024-03-11 NOTE — TELEPHONE ENCOUNTER
Caller: Sen Paiz    Relationship: Self    Best call back number: [phone]    What medication are you requesting: Trulicity 0.75 MG/0.5ML solution pen-injector [Pharmacy Med Name: TRULICITY 0.75MG/0.5ML SDP 0.5ML]     COULD NOT LOCATE ON CHART. PLEASE CALL PATIENT WITH STATUS. THIS MEDICATION HAS HAPPENED THE PAST THREE MONTHS AND WOULD LIKE A CALL TO GET ISSUE FIXED    If a prescription is needed, what is your preferred pharmacy and phone number: [id number] [address] AT [facility] [address] [facility] - [phone]  - [phone] FX     Additional notes:

## 2024-03-11 NOTE — TELEPHONE ENCOUNTER
That is because it is under the generic name DURAGLUTIDE.  You may call him back and let him know there is no mistake being made, whomever is giving him the information doesn't know the difference between between generic and brand.

## 2024-03-13 ENCOUNTER — HOSPITAL ENCOUNTER (OUTPATIENT)
Dept: CT IMAGING | Facility: HOSPITAL | Age: 79
Discharge: HOME | End: 2024-03-13
Admitting: INTERNAL MEDICINE
Payer: MEDICARE

## 2024-03-13 DIAGNOSIS — Z12.2 SCREENING FOR LUNG CANCER: ICD-10-CM

## 2024-03-13 PROCEDURE — 71271 CT THORAX LUNG CANCER SCR C-: CPT

## 2024-04-07 DIAGNOSIS — E11.40 TYPE 2 DIABETES MELLITUS WITH DIABETIC NEUROPATHY, WITHOUT LONG-TERM CURRENT USE OF INSULIN: ICD-10-CM

## 2024-04-08 RX ORDER — EMPAGLIFLOZIN 25 MG/1
TABLET, FILM COATED ORAL
Qty: 90 TABLET | Refills: 0 | Status: SHIPPED | OUTPATIENT
Start: 2024-04-08

## 2024-04-10 DIAGNOSIS — E11.42 DIABETIC PERIPHERAL NEUROPATHY: ICD-10-CM

## 2024-04-11 RX ORDER — IRBESARTAN 300 MG/1
300 TABLET ORAL NIGHTLY
Qty: 90 TABLET | Refills: 1 | Status: SHIPPED | OUTPATIENT
Start: 2024-04-11

## 2024-04-11 RX ORDER — PREGABALIN 200 MG/1
200 CAPSULE ORAL 2 TIMES DAILY
Qty: 60 CAPSULE | Refills: 2 | Status: SHIPPED | OUTPATIENT
Start: 2024-04-11

## 2024-05-05 DIAGNOSIS — F51.01 PRIMARY INSOMNIA: ICD-10-CM

## 2024-05-07 RX ORDER — ZOLPIDEM TARTRATE 5 MG/1
5 TABLET ORAL NIGHTLY PRN
Qty: 90 TABLET | Refills: 0 | Status: SHIPPED | OUTPATIENT
Start: 2024-05-07

## 2024-05-07 RX ORDER — DULAGLUTIDE 0.75 MG/.5ML
INJECTION, SOLUTION SUBCUTANEOUS
Qty: 6 ML | Refills: 1 | OUTPATIENT
Start: 2024-05-07

## 2024-05-07 RX ORDER — DULAGLUTIDE 1.5 MG/.5ML
1.5 INJECTION, SOLUTION SUBCUTANEOUS WEEKLY
Qty: 2 ML | Refills: 1 | Status: SHIPPED | OUTPATIENT
Start: 2024-05-07

## 2024-06-04 RX ORDER — FLURBIPROFEN SODIUM 0.3 MG/ML
SOLUTION/ DROPS OPHTHALMIC
Qty: 100 EACH | Refills: 3 | Status: SHIPPED | OUTPATIENT
Start: 2024-06-04

## 2024-07-01 DIAGNOSIS — E11.40 TYPE 2 DIABETES MELLITUS WITH DIABETIC NEUROPATHY, WITHOUT LONG-TERM CURRENT USE OF INSULIN: ICD-10-CM

## 2024-07-01 RX ORDER — EMPAGLIFLOZIN 25 MG/1
TABLET, FILM COATED ORAL
Qty: 90 TABLET | Refills: 0 | Status: SHIPPED | OUTPATIENT
Start: 2024-07-01

## 2024-07-01 NOTE — TELEPHONE ENCOUNTER
Rx Refill Note  Requested Prescriptions     Pending Prescriptions Disp Refills    Jardiance 25 MG tablet tablet [Pharmacy Med Name: JARDIANCE 25MG TABLETS] 90 tablet 0     Sig: TAKE 1 TABLET BY MOUTH DAILY      Last office visit with prescribing clinician: 12/19/2023   Last telemedicine visit with prescribing clinician: Visit date not found   Next office visit with prescribing clinician: Visit date not found                         Would you like a call back once the refill request has been completed: [] Yes [] No    If the office needs to give you a call back, can they leave a voicemail: [] Yes [] No    Jacinda Dias MA  07/01/24, 11:15 EDT

## 2024-07-08 RX ORDER — DULAGLUTIDE 1.5 MG/.5ML
INJECTION, SOLUTION SUBCUTANEOUS
Qty: 2 ML | Refills: 1 | Status: SHIPPED | OUTPATIENT
Start: 2024-07-08

## 2024-07-09 DIAGNOSIS — E11.42 DIABETIC PERIPHERAL NEUROPATHY: ICD-10-CM

## 2024-07-09 RX ORDER — PREGABALIN 200 MG/1
200 CAPSULE ORAL 2 TIMES DAILY
Qty: 60 CAPSULE | Refills: 1 | Status: SHIPPED | OUTPATIENT
Start: 2024-07-09

## 2024-08-05 RX ORDER — ATORVASTATIN CALCIUM 40 MG/1
40 TABLET, FILM COATED ORAL DAILY
Qty: 90 TABLET | Refills: 0 | Status: SHIPPED | OUTPATIENT
Start: 2024-08-05

## 2024-08-08 DIAGNOSIS — F51.01 PRIMARY INSOMNIA: ICD-10-CM

## 2024-08-09 RX ORDER — ZOLPIDEM TARTRATE 5 MG/1
5 TABLET ORAL NIGHTLY PRN
Qty: 90 TABLET | Refills: 0 | Status: SHIPPED | OUTPATIENT
Start: 2024-08-09

## 2024-08-09 NOTE — TELEPHONE ENCOUNTER
Rx Refill Note  Requested Prescriptions     Pending Prescriptions Disp Refills    zolpidem (AMBIEN) 5 MG tablet [Pharmacy Med Name: ZOLPIDEM 5MG TABLETS] 90 tablet 0     Sig: TAKE 1 TABLET BY MOUTH AT NIGHT AS NEEDED FOR SLEEP      Last office visit with prescribing clinician: 12/19/2023   Last telemedicine visit with prescribing clinician: Visit date not found   Next office visit with prescribing clinician: Visit date not found                         Would you like a call back once the refill request has been completed: [] Yes [] No    If the office needs to give you a call back, can they leave a voicemail: [] Yes [] No    Jacinda Dias MA  08/09/24, 07:55 EDT

## 2024-09-03 RX ORDER — DULAGLUTIDE 1.5 MG/.5ML
INJECTION, SOLUTION SUBCUTANEOUS
Qty: 2 ML | Refills: 0 | Status: SHIPPED | OUTPATIENT
Start: 2024-09-03

## 2024-09-05 RX ORDER — METFORMIN HCL 500 MG
1000 TABLET, EXTENDED RELEASE 24 HR ORAL 2 TIMES DAILY
Qty: 360 TABLET | Refills: 0 | Status: SHIPPED | OUTPATIENT
Start: 2024-09-05

## 2024-09-08 DIAGNOSIS — E11.42 DIABETIC PERIPHERAL NEUROPATHY: ICD-10-CM

## 2024-09-09 RX ORDER — PREGABALIN 200 MG/1
200 CAPSULE ORAL 2 TIMES DAILY
Qty: 60 CAPSULE | Refills: 1 | Status: SHIPPED | OUTPATIENT
Start: 2024-09-09

## 2024-09-19 RX ORDER — LANCETS
EACH MISCELLANEOUS
Qty: 306 EACH | Refills: 3 | Status: SHIPPED | OUTPATIENT
Start: 2024-09-19

## 2024-09-25 RX ORDER — DULAGLUTIDE 1.5 MG/.5ML
INJECTION, SOLUTION SUBCUTANEOUS
Qty: 2 ML | Refills: 1 | Status: SHIPPED | OUTPATIENT
Start: 2024-09-25

## 2024-10-04 DIAGNOSIS — E11.40 TYPE 2 DIABETES MELLITUS WITH DIABETIC NEUROPATHY, WITHOUT LONG-TERM CURRENT USE OF INSULIN: ICD-10-CM

## 2024-10-04 RX ORDER — EMPAGLIFLOZIN 25 MG/1
TABLET, FILM COATED ORAL
Qty: 90 TABLET | Refills: 1 | Status: SHIPPED | OUTPATIENT
Start: 2024-10-04

## 2024-10-04 RX ORDER — IRBESARTAN 300 MG/1
300 TABLET ORAL NIGHTLY
Qty: 90 TABLET | Refills: 1 | Status: SHIPPED | OUTPATIENT
Start: 2024-10-04

## 2024-10-09 RX ORDER — INSULIN GLARGINE 100 [IU]/ML
50 INJECTION, SOLUTION SUBCUTANEOUS NIGHTLY
Qty: 45 ML | Refills: 2 | Status: SHIPPED | OUTPATIENT
Start: 2024-10-09

## 2024-10-18 ENCOUNTER — LAB (OUTPATIENT)
Dept: LAB | Facility: HOSPITAL | Age: 79
End: 2024-10-18
Payer: MEDICARE

## 2024-10-18 ENCOUNTER — OFFICE VISIT (OUTPATIENT)
Dept: INTERNAL MEDICINE | Facility: CLINIC | Age: 79
End: 2024-10-18
Payer: MEDICARE

## 2024-10-18 VITALS
DIASTOLIC BLOOD PRESSURE: 78 MMHG | TEMPERATURE: 96.6 F | SYSTOLIC BLOOD PRESSURE: 128 MMHG | WEIGHT: 233.8 LBS | HEART RATE: 92 BPM | HEIGHT: 73 IN | BODY MASS INDEX: 30.99 KG/M2 | OXYGEN SATURATION: 98 %

## 2024-10-18 DIAGNOSIS — I10 BENIGN ESSENTIAL HYPERTENSION: ICD-10-CM

## 2024-10-18 DIAGNOSIS — Z00.00 ANNUAL PHYSICAL EXAM: ICD-10-CM

## 2024-10-18 DIAGNOSIS — R06.09 DOE (DYSPNEA ON EXERTION): ICD-10-CM

## 2024-10-18 DIAGNOSIS — Z12.11 COLON CANCER SCREENING: ICD-10-CM

## 2024-10-18 DIAGNOSIS — E11.40 TYPE 2 DIABETES MELLITUS WITH DIABETIC NEUROPATHY, WITHOUT LONG-TERM CURRENT USE OF INSULIN: ICD-10-CM

## 2024-10-18 DIAGNOSIS — E78.2 MIXED HYPERLIPIDEMIA: ICD-10-CM

## 2024-10-18 DIAGNOSIS — Z00.00 MEDICARE ANNUAL WELLNESS VISIT, SUBSEQUENT: Primary | ICD-10-CM

## 2024-10-18 LAB
ALBUMIN SERPL-MCNC: 4.4 G/DL (ref 3.5–5.2)
ALBUMIN UR-MCNC: 29.1 MG/DL
ALBUMIN/GLOB SERPL: 1.8 G/DL
ALP SERPL-CCNC: 94 U/L (ref 39–117)
ALT SERPL W P-5'-P-CCNC: 28 U/L (ref 1–41)
ANION GAP SERPL CALCULATED.3IONS-SCNC: 8.7 MMOL/L (ref 5–15)
AST SERPL-CCNC: 21 U/L (ref 1–40)
BILIRUB SERPL-MCNC: 0.7 MG/DL (ref 0–1.2)
BUN SERPL-MCNC: 19 MG/DL (ref 8–23)
BUN/CREAT SERPL: 21.3 (ref 7–25)
CALCIUM SPEC-SCNC: 9.8 MG/DL (ref 8.6–10.5)
CHLORIDE SERPL-SCNC: 103 MMOL/L (ref 98–107)
CHOLEST SERPL-MCNC: 113 MG/DL (ref 0–200)
CO2 SERPL-SCNC: 28.3 MMOL/L (ref 22–29)
CREAT SERPL-MCNC: 0.89 MG/DL (ref 0.76–1.27)
CREAT UR-MCNC: 48.3 MG/DL
EGFRCR SERPLBLD CKD-EPI 2021: 88.3 ML/MIN/1.73
GLOBULIN UR ELPH-MCNC: 2.4 GM/DL
GLUCOSE SERPL-MCNC: 166 MG/DL (ref 65–99)
HBA1C MFR BLD: 8.4 % (ref 4.8–5.6)
HDLC SERPL-MCNC: 28 MG/DL (ref 40–60)
LDLC SERPL CALC-MCNC: 45 MG/DL (ref 0–100)
LDLC/HDLC SERPL: 1.19 {RATIO}
MICROALBUMIN/CREAT UR: 602.5 MG/G (ref 0–29)
POTASSIUM SERPL-SCNC: 4.8 MMOL/L (ref 3.5–5.2)
PROT SERPL-MCNC: 6.8 G/DL (ref 6–8.5)
SODIUM SERPL-SCNC: 140 MMOL/L (ref 136–145)
TRIGL SERPL-MCNC: 259 MG/DL (ref 0–150)
VLDLC SERPL-MCNC: 40 MG/DL (ref 5–40)

## 2024-10-18 PROCEDURE — 80061 LIPID PANEL: CPT | Performed by: FAMILY MEDICINE

## 2024-10-18 PROCEDURE — 82043 UR ALBUMIN QUANTITATIVE: CPT | Performed by: FAMILY MEDICINE

## 2024-10-18 PROCEDURE — 82570 ASSAY OF URINE CREATININE: CPT | Performed by: FAMILY MEDICINE

## 2024-10-18 PROCEDURE — 36415 COLL VENOUS BLD VENIPUNCTURE: CPT | Performed by: FAMILY MEDICINE

## 2024-10-18 PROCEDURE — 80053 COMPREHEN METABOLIC PANEL: CPT | Performed by: FAMILY MEDICINE

## 2024-10-18 PROCEDURE — 83036 HEMOGLOBIN GLYCOSYLATED A1C: CPT | Performed by: FAMILY MEDICINE

## 2024-10-18 RX ORDER — AMITRIPTYLINE HYDROCHLORIDE 10 MG/1
10 TABLET ORAL NIGHTLY
Qty: 30 TABLET | Refills: 1 | Status: SHIPPED | OUTPATIENT
Start: 2024-10-18

## 2024-10-18 RX ORDER — DULAGLUTIDE 3 MG/.5ML
3 INJECTION, SOLUTION SUBCUTANEOUS WEEKLY
Qty: 2 ML | Refills: 5 | Status: SHIPPED | OUTPATIENT
Start: 2024-10-18

## 2024-10-18 NOTE — PROGRESS NOTES
"Chief Complaint  Shortness of Breath, Pain, and Diabetes    Subjective        Sen Paiz presents to [facility] PRIMARY CARE  Shortness of Breath    Pain    Diabetes      Patient appointment to discuss diabetes bilateral foot pain shortness of breath results of CT scan performed through pulmonology for difficulty breathing  Patient has decreased exercise tolerance does not mowed the lawn much a more work outside he fatigues easily.  CT scan reveals coronary artery calcifications patient is known diabetic poorly controlled as he does not check his blood sugars after he eats relies on A1c correlations to morning fasting blood sugars only and does not believe that the A1c of 9 in the past is reliable since he is getting an A1c of 6-7  Discussion regarding check his blood sugars as he is hesitant to use continuous glucose monitor because of the length the noise associated with it on his phone  He has bilateral foot pain some improvement with Lyrica as opposed to gabapentin neuropathic due to poorly controlled diabetes    Objective   Vital Signs:  /78   Pulse 92   Temp 96.6 °F (35.9 °C) (Temporal)   Ht 185.4 cm (72.99\")   Wt 106 kg (233 lb 12.8 oz)   SpO2 98%   BMI 30.85 kg/m²   Estimated body mass index is 30.85 kg/m² as calculated from the following:    Height as of this encounter: 185.4 cm (72.99\").    Weight as of this encounter: 106 kg (233 lb 12.8 oz).          Physical Exam  Vitals and nursing note reviewed.   Constitutional:       Appearance: Normal appearance. He is well-developed. He is not diaphoretic.   HENT:      Head: Normocephalic and atraumatic.      Right Ear: Tympanic membrane, ear canal and external ear normal.      Left Ear: Tympanic membrane, ear canal and external ear normal.   Eyes:      General: Lids are normal. No scleral icterus.     Extraocular Movements: Extraocular movements intact.      Conjunctiva/sclera: Conjunctivae normal.   Neck:      Thyroid: No " thyroid mass or thyromegaly.      Vascular: No carotid bruit or JVD.   Cardiovascular:      Rate and Rhythm: Normal rate and regular rhythm.      Pulses: Normal pulses.           Radial pulses are 2+ on the right side and 2+ on the left side.      Heart sounds: Normal heart sounds. No murmur heard.  Pulmonary:      Effort: Pulmonary effort is normal. No respiratory distress.      Breath sounds: Normal breath sounds.   Abdominal:      Palpations: Abdomen is soft.      Tenderness: There is no right CVA tenderness or left CVA tenderness.   Musculoskeletal:      Cervical back: Normal range of motion.      Right lower leg: No edema.      Left lower leg: No edema.   Skin:     General: Skin is warm and dry.      Coloration: Skin is not pale.      Findings: No erythema or rash.   Neurological:      General: No focal deficit present.      Mental Status: He is alert and oriented to person, place, and time.      Sensory: No sensory deficit.      Deep Tendon Reflexes: Reflexes are normal and symmetric.   Psychiatric:         Mood and Affect: Mood normal.         Behavior: Behavior normal. Behavior is cooperative.         Thought Content: Thought content normal.         Judgment: Judgment normal.        Result Review :    Common labs          12/19/2023    14:11   Common Labs   Glucose 106    BUN 15    Creatinine 0.82    Sodium 141    Potassium 4.4    Chloride 99    Calcium 10.1    Total Protein 6.9    Albumin 4.9    Total Bilirubin 0.6    Alkaline Phosphatase 97    AST (SGOT) 21    ALT (SGPT) 31    Hemoglobin A1C 9.4    Microalbumin, Urine 155.4                Assessment and Plan   Diagnoses and all orders for this visit:        1. Type 2 diabetes mellitus with diabetic neuropathy, without long-term current use of insulin  -     Hemoglobin A1c  -     Microalbumin / Creatinine Urine Ratio - Urine, Clean Catch  -     amitriptyline (ELAVIL) 10 MG tablet; Take 1 tablet by mouth Every Night.  Dispense: 30 tablet; Refill: 1  -      Dulaglutide (Trulicity) 3 MG/0.5ML solution pen-injector; Inject 0.5 mL under the skin into the appropriate area as directed 1 (One) Time Per Week.  Dispense: 2 mL; Refill: 5    2. Mixed hyperlipidemia  Assessment & Plan:       Orders:  -     Lipid Panel    3. Benign essential hypertension  -     Comprehensive Metabolic Panel    4. Colon cancer screening  -     Ambulatory Referral For Screening Colonoscopy    5. VERDE (dyspnea on exertion)  -     Stress Test With Myocardial Perfusion One Day; Future    Increase Trulicity  Add amitriptyline  Schedule stress test  If worsening shortness of breath to [facility] emergency department  Follow-up results of testing         Follow Up   Return in about 1 month (around 11/18/2024), or if symptoms worsen or fail to improve, for Recheck, Medicare Wellness.  Patient was given instructions and counseling regarding his condition or for health maintenance advice. Please see specific information pulled into the AVS if appropriate.

## 2024-10-18 NOTE — PROGRESS NOTES
Subjective   The ABCs of the Annual Wellness Visit  Medicare Wellness Visit      Sen Paiz is a 79 y.o. patient who presents for a Medicare Wellness Visit.    The following portions of the patient's history were reviewed and   updated as appropriate: allergies, current medications, past family history, past medical history, past social history, past surgical history, and problem list.    Compared to one year ago, the patient's physical   health is the same.  Compared to one year ago, the patient's mental   health is the same.    Recent Hospitalizations:  He was not admitted to the hospital during the last year.     Current Medical Providers:  Patient Care Team:  Jeyson Lozoya MD as PCP - General (Family Medicine)  Ernst Crowder MD as Consulting Physician (Hematology and Oncology)    Outpatient Medications Prior to Visit   Medication Sig Dispense Refill    Accu-Chek FastClix Lancets misc CHECK FASTING BLOOD SUGAR TWICE DAILY 306 each 3    atorvastatin (LIPITOR) 40 MG tablet TAKE 1 TABLET BY MOUTH DAILY 90 tablet 0    B-D UF III MINI PEN NEEDLES 31G X 5 MM misc USE AS DIRECTED EVERY  each 3    Coenzyme Q10 (COQ-10) 400 MG capsule Take 200 mg by mouth Daily.      empagliflozin (Jardiance) 25 MG tablet tablet TAKE 1 TABLET BY MOUTH DAILY 90 tablet 1    glucose blood (Accu-Chek Guide) test strip USE AS DIRECTED 100 each 11    Ibuprofen (Advil) 200 MG capsule Take  by mouth.      Insulin Glargine (Lantus SoloStar) 100 UNIT/ML injection pen Inject 50 Units under the skin into the appropriate area as directed Every Night. 45 mL 2    irbesartan (AVAPRO) 300 MG tablet TAKE 1 TABLET BY MOUTH EVERY NIGHT 90 tablet 1    METAMUCIL FIBER PO Take  by mouth.      metFORMIN ER (GLUCOPHAGE-XR) 500 MG 24 hr tablet TAKE 2 TABLETS BY MOUTH TWICE DAILY 360 tablet 0    multivitamin with minerals tablet tablet       NON FORMULARY Magnilite DB cream      NON FORMULARY Magnitlite Leg cream      pregabalin (LYRICA) 200 MG  capsule TAKE 1 CAPSULE BY MOUTH TWICE DAILY 60 capsule 1    zolpidem (AMBIEN) 5 MG tablet TAKE 1 TABLET BY MOUTH AT NIGHT AS NEEDED FOR SLEEP 90 tablet 0    Dulaglutide (Trulicity) 1.5 MG/0.5ML solution pen-injector ADMINISTER 1.5 MG UNDER THE SKIN 1 TIME EVERY WEEK AS DIRECTED 2 mL 1    NON FORMULARY Nervive nerve relief      aspirin 81 MG chewable tablet Chew 1 tablet Daily.      Fluzone High-Dose Quadrivalent 0.7 ML suspension prefilled syringe injection       pregabalin (Lyrica) 200 MG capsule Take 1 capsule by mouth 2 (Two) Times a Day for 2 days. 4 capsule 0     No facility-administered medications prior to visit.     No opioid medication identified on active medication list. I have reviewed chart for other potential  high risk medication/s and harmful drug interactions in the elderly.      Aspirin is on active medication list. Aspirin use is not indicated based on review of current medical condition/s. Risk of harm outweighs potential benefits. Patient instructed to discontinue this medication.  .      Patient Active Problem List   Diagnosis    Benign essential hypertension    Asymptomatic cholelithiasis    Type 2 diabetes mellitus with diabetic neuropathy, without long-term current use of insulin    Knee pain    Fatigue    Diabetic peripheral neuropathy    Dupuytren's contracture of both hands    Annual physical exam    Mixed hyperlipidemia    Granulocytosis    Elevated hemoglobin    Erythrocytosis    Tobacco dependency    Acute respiratory failure with hypoxia    Community acquired pneumonia of left lower lobe of lung    Tobacco abuse    Pulmonary nodules    Shortness of breath    Pulmonary emphysema    Kidney lesion    Medicare annual wellness visit, initial    Renal cyst, acquired, left    Medicare annual wellness visit, subsequent    Memory loss    Primary insomnia    Rhinorrhea    Colon cancer screening     Advance Care Planning Advance Directive is not on file.  ACP discussion was held with the patient  "during this visit. Patient does not have an advance directive, information provided.            Objective   Vitals:    10/18/24 1527   BP: 128/78   Pulse: 92   Temp: 96.6 °F (35.9 °C)   TempSrc: Temporal   SpO2: 98%   Weight: 106 kg (233 lb 12.8 oz)   Height: 185.4 cm (72.99\")   PainSc:   6   PainLoc: Foot       Estimated body mass index is 30.85 kg/m² as calculated from the following:    Height as of this encounter: 185.4 cm (72.99\").    Weight as of this encounter: 106 kg (233 lb 12.8 oz).    BMI is >= 30 and <35. (Class 1 Obesity). The following options were offered after discussion;: nutrition counseling/recommendations       Does the patient have evidence of cognitive impairment? No                                                                                               Health  Risk Assessment    Smoking Status:  Social History     Tobacco Use   Smoking Status Former    Current packs/day: 0.00    Average packs/day: 2.0 packs/day for 55.0 years (110.0 ttl pk-yrs)    Types: Cigarettes    Start date: 12/10/1964    Quit date: 12/10/2019    Years since quittin.8   Smokeless Tobacco Never   Tobacco Comments    52+ years     Alcohol Consumption:  Social History     Substance and Sexual Activity   Alcohol Use Yes    Alcohol/week: 2.0 standard drinks of alcohol    Types: 2 Cans of beer per week    Comment: occasional       Fall Risk Screen  STEADI Fall Risk Assessment was completed, and patient is at MODERATE risk for falls. Assessment completed on:10/18/2024    Depression Screening:      10/18/2024     3:29 PM   PHQ-2/PHQ-9 Depression Screening   Little interest or pleasure in doing things Not at all   Feeling down, depressed, or hopeless Not at all     Health Habits and Functional and Cognitive Screening:      10/11/2024     9:39 AM   Functional & Cognitive Status   Do you have difficulty preparing food and eating? No    Do you have difficulty bathing yourself, getting dressed or grooming yourself? No    Do " you have difficulty using the toilet? No    Do you have difficulty moving around from place to place? Yes    Do you have trouble with steps or getting out of a bed or a chair? Yes    Current Diet Well Balanced Diet    Dental Exam Up to date    Eye Exam Up to date    Exercise (times per week) 0 times per week    Current Exercises Include No Regular Exercise    Do you need help using the phone?  No    Are you deaf or do you have serious difficulty hearing?  No    Do you need help to go to places out of walking distance? No    Do you need help shopping? No    Do you need help preparing meals?  No    Do you need help with housework?  No    Do you need help with laundry? No    Do you need help taking your medications? No    Do you need help managing money? No    Do you ever drive or ride in a car without wearing a seat belt? No    Have you felt unusual stress, anger or loneliness in the last month? No    Who do you live with? Spouse    If you need help, do you have trouble finding someone available to you? No    Have you been bothered in the last four weeks by sexual problems? No    Do you have difficulty concentrating, remembering or making decisions? No        Patient-reported           Age-appropriate Screening Schedule:  Refer to the list below for future screening recommendations based on patient's age, sex and/or medical conditions. Orders for these recommended tests are listed in the plan section. The patient has been provided with a written plan.    Health Maintenance List  Health Maintenance   Topic Date Due    TDAP/TD VACCINES (1 - Tdap) Never done    LIPID PANEL  10/20/2023    ANNUAL WELLNESS VISIT  02/23/2024    BMI FOLLOWUP  02/23/2024    HEMOGLOBIN A1C  06/19/2024    COLORECTAL CANCER SCREENING  07/30/2024    COVID-19 Vaccine (3 - 2023-24 season) 09/01/2024    URINE MICROALBUMIN  12/19/2024    LUNG CANCER SCREENING  03/13/2025    DIABETIC EYE EXAM  07/23/2025    HEPATITIS C SCREENING  Completed    RSV  Vaccine - Adults  Completed    INFLUENZA VACCINE  Completed    Pneumococcal Vaccine 65+  Completed    ZOSTER VACCINE  Completed                                                                                                                                                CMS Preventative Services Quick Reference  Risk Factors Identified During Encounter  Chronic Pain: Natural history and expected course discussed. Questions answered.  Inactivity/Sedentary:  exercise pending stress test    The above risks/problems have been discussed with the patient.  Pertinent information has been shared with the patient in the After Visit Summary.  An After Visit Summary and PPPS were made available to the patient.    Follow Up:   Next Medicare Wellness visit to be scheduled in 1 year.     Assessment & Plan  Medicare annual wellness visit, subsequent    Annual physical exam    Type 2 diabetes mellitus with diabetic neuropathy, without long-term current use of insulin    Mixed hyperlipidemia     Benign essential hypertension    Colon cancer screening    VERDE (dyspnea on exertion)      Orders Placed This Encounter   Procedures    Comprehensive Metabolic Panel    Hemoglobin A1c    Microalbumin / Creatinine Urine Ratio - Urine, Clean Catch    Lipid Panel    Ambulatory Referral For Screening Colonoscopy    Stress Test With Myocardial Perfusion One Day     New Medications Ordered This Visit   Medications    amitriptyline (ELAVIL) 10 MG tablet     Sig: Take 1 tablet by mouth Every Night.     Dispense:  30 tablet     Refill:  1    Dulaglutide (Trulicity) 3 MG/0.5ML solution pen-injector     Sig: Inject 0.5 mL under the skin into the appropriate area as directed 1 (One) Time Per Week.     Dispense:  2 mL     Refill:  5     Increase dose          Follow Up:   No follow-ups on file.

## 2024-10-18 NOTE — PROGRESS NOTES
Subjective   Sen Paiz is a 79 y.o. male.     Chief Complaint   Patient presents with    Annual Exam         History of Present Illness   Sen Paiz 79 y.o. male who presents for an Annual Wellness Visit.  he has a history of   Patient Active Problem List   Diagnosis    Benign essential hypertension    Asymptomatic cholelithiasis    Type 2 diabetes mellitus with diabetic neuropathy, without long-term current use of insulin    Knee pain    Fatigue    Diabetic peripheral neuropathy    Dupuytren's contracture of both hands    Annual physical exam    Mixed hyperlipidemia    Granulocytosis    Elevated hemoglobin    Erythrocytosis    Tobacco dependency    Acute respiratory failure with hypoxia    Community acquired pneumonia of left lower lobe of lung    Tobacco abuse    Pulmonary nodules    Shortness of breath    Pulmonary emphysema    Kidney lesion    Medicare annual wellness visit, initial    Renal cyst, acquired, left    Medicare annual wellness visit, subsequent    Memory loss    Primary insomnia    Rhinorrhea    Colon cancer screening   .  he has been feeling fairly well.   I  reviewed health maintenance with him as part of my preventative care plan.  Recommend regular eye and dental exams  The following portions of the patient's history were reviewed and updated as appropriate: allergies, current medications, past family history, past medical history, past social history, past surgical history, and problem list.    Review of Systems   Constitutional:  Negative for appetite change, fever and unexpected weight change.   HENT:  Negative for ear pain, facial swelling and sore throat.    Eyes:  Negative for pain and visual disturbance.   Respiratory:  Positive for shortness of breath. Negative for chest tightness and wheezing.    Cardiovascular:  Negative for chest pain and palpitations.   Gastrointestinal:  Negative for abdominal pain and blood in stool.   Endocrine: Negative.    Genitourinary:  Negative for  difficulty urinating and hematuria.   Musculoskeletal:  Positive for arthralgias and gait problem. Negative for joint swelling.   Neurological:  Negative for tremors, seizures and syncope.   Hematological:  Negative for adenopathy.   Psychiatric/Behavioral: Negative.     All other systems reviewed and are negative.      Objective   Physical Exam  Vitals and nursing note reviewed.   Constitutional:       Appearance: Normal appearance. He is well-developed. He is not diaphoretic.   HENT:      Head: Normocephalic and atraumatic.      Right Ear: External ear normal.      Left Ear: External ear normal.   Eyes:      General: Lids are normal. No scleral icterus.     Extraocular Movements: Extraocular movements intact.      Conjunctiva/sclera: Conjunctivae normal.   Neck:      Thyroid: No thyroid mass or thyromegaly.      Vascular: No carotid bruit or JVD.   Cardiovascular:      Rate and Rhythm: Normal rate and regular rhythm.      Pulses: Normal pulses.           Radial pulses are 2+ on the right side and 2+ on the left side.      Heart sounds: Normal heart sounds. No murmur heard.  Pulmonary:      Effort: Pulmonary effort is normal. No respiratory distress.      Breath sounds: Normal breath sounds.   Abdominal:      Palpations: Abdomen is soft.      Tenderness: There is no right CVA tenderness or left CVA tenderness.   Musculoskeletal:      Cervical back: Normal range of motion.      Right lower leg: No edema.      Left lower leg: No edema.   Skin:     General: Skin is warm and dry.      Coloration: Skin is not pale.      Findings: No erythema or rash.   Neurological:      General: No focal deficit present.      Mental Status: He is alert and oriented to person, place, and time.      Sensory: No sensory deficit.      Deep Tendon Reflexes: Reflexes are normal and symmetric.   Psychiatric:         Mood and Affect: Mood normal.         Behavior: Behavior normal. Behavior is cooperative.         Thought Content: Thought  content normal.         Judgment: Judgment normal.         Assessment & Plan   Diagnoses and all orders for this visit:    1. Medicare annual wellness visit, subsequent (Primary)    2. Annual physical exam    3. Type 2 diabetes mellitus with diabetic neuropathy, without long-term current use of insulin  -     Hemoglobin A1c  -     Microalbumin / Creatinine Urine Ratio - Urine, Clean Catch  -     amitriptyline (ELAVIL) 10 MG tablet; Take 1 tablet by mouth Every Night.  Dispense: 30 tablet; Refill: 1  -     Dulaglutide (Trulicity) 3 MG/0.5ML solution pen-injector; Inject 0.5 mL under the skin into the appropriate area as directed 1 (One) Time Per Week.  Dispense: 2 mL; Refill: 5    4. Mixed hyperlipidemia  Assessment & Plan:       Orders:  -     Lipid Panel    5. Benign essential hypertension  -     Comprehensive Metabolic Panel    6. Colon cancer screening  -     Ambulatory Referral For Screening Colonoscopy    7. VERDE (dyspnea on exertion)  -     Stress Test With Myocardial Perfusion One Day; Future      Emphasized healthy lifestyle calorie appropriate diet regular physical activity decrease concentrated sweets and decrease snacks  Education provided regarding prevention of serious illness with immunizations patient's current  Education provided regarding early detection screening colonoscopy follow-up will be scheduled  Follow-up ongoing management of chronic problems otherwise preventatively annually

## 2024-11-04 DIAGNOSIS — E11.42 DIABETIC PERIPHERAL NEUROPATHY: ICD-10-CM

## 2024-11-04 RX ORDER — ATORVASTATIN CALCIUM 40 MG/1
40 TABLET, FILM COATED ORAL DAILY
Qty: 90 TABLET | Refills: 0 | Status: SHIPPED | OUTPATIENT
Start: 2024-11-04

## 2024-11-05 RX ORDER — PREGABALIN 200 MG/1
200 CAPSULE ORAL 2 TIMES DAILY
Qty: 60 CAPSULE | Refills: 1 | Status: SHIPPED | OUTPATIENT
Start: 2024-11-05

## 2024-11-14 ENCOUNTER — TELEPHONE (OUTPATIENT)
Dept: CARDIOLOGY | Facility: CLINIC | Age: 79
End: 2024-11-14
Payer: MEDICARE

## 2024-11-15 ENCOUNTER — HOSPITAL ENCOUNTER (OUTPATIENT)
Dept: CARDIOLOGY | Facility: HOSPITAL | Age: 79
Discharge: HOME | End: 2024-11-15
Payer: MEDICARE

## 2024-11-15 VITALS — WEIGHT: 233.69 LBS | HEIGHT: 73 IN | BODY MASS INDEX: 30.97 KG/M2

## 2024-11-15 DIAGNOSIS — R06.09 DOE (DYSPNEA ON EXERTION): ICD-10-CM

## 2024-11-15 LAB
BH CV NUCLEAR PRIOR STUDY: 2
BH CV REST NUCLEAR ISOTOPE DOSE: 11.4 MCI
BH CV STRESS BP STAGE 1: NORMAL
BH CV STRESS COMMENTS STAGE 1: NORMAL
BH CV STRESS DOSE REGADENOSON STAGE 1: 0.4
BH CV STRESS DURATION MIN STAGE 1: 0
BH CV STRESS DURATION SEC STAGE 1: 10
BH CV STRESS HR STAGE 1: 114
BH CV STRESS NUCLEAR ISOTOPE DOSE: 35.7 MCI
BH CV STRESS PROTOCOL 1: NORMAL
BH CV STRESS RECOVERY BP: NORMAL MMHG
BH CV STRESS RECOVERY HR: 102 BPM
BH CV STRESS STAGE 1: 1
LV EF NUC BP: 64 %
MAXIMAL PREDICTED HEART RATE: 143 BPM
PERCENT MAX PREDICTED HR: 79.72 %
STRESS BASELINE BP: NORMAL MMHG
STRESS BASELINE HR: 90 BPM
STRESS PERCENT HR: 94 %
STRESS POST EXERCISE DUR SEC: 10 SEC
STRESS POST PEAK BP: NORMAL MMHG
STRESS POST PEAK HR: 114 BPM
STRESS TARGET HR: 122 BPM

## 2024-11-15 PROCEDURE — A9500 TC99M SESTAMIBI: HCPCS | Performed by: FAMILY MEDICINE

## 2024-11-15 PROCEDURE — 78452 HT MUSCLE IMAGE SPECT MULT: CPT

## 2024-11-15 PROCEDURE — 25010000002 REGADENOSON 0.4 MG/5ML SOLUTION: Performed by: FAMILY MEDICINE

## 2024-11-15 PROCEDURE — 93017 CV STRESS TEST TRACING ONLY: CPT

## 2024-11-15 PROCEDURE — 34310000005 TECHNETIUM SESTAMIBI: Performed by: FAMILY MEDICINE

## 2024-11-15 RX ORDER — REGADENOSON 0.08 MG/ML
0.4 INJECTION, SOLUTION INTRAVENOUS
Status: COMPLETED | OUTPATIENT
Start: 2024-11-15 | End: 2024-11-15

## 2024-11-15 RX ADMIN — TECHNETIUM TC 99M SESTAMIBI 1 DOSE: 1 INJECTION INTRAVENOUS at 12:51

## 2024-11-15 RX ADMIN — TECHNETIUM TC 99M SESTAMIBI 1 DOSE: 1 INJECTION INTRAVENOUS at 11:10

## 2024-11-15 RX ADMIN — REGADENOSON 0.4 MG: 0.08 INJECTION, SOLUTION INTRAVENOUS at 12:50

## 2024-11-18 ENCOUNTER — PREP FOR SURGERY (OUTPATIENT)
Dept: SURGERY | Facility: SURGERY CENTER | Age: 79
End: 2024-11-18
Payer: MEDICARE

## 2024-11-18 ENCOUNTER — TELEPHONE (OUTPATIENT)
Dept: INTERNAL MEDICINE | Facility: CLINIC | Age: 79
End: 2024-11-18
Payer: MEDICARE

## 2024-11-18 DIAGNOSIS — R94.39 ABNORMAL STRESS TEST: ICD-10-CM

## 2024-11-18 DIAGNOSIS — R06.09 DOE (DYSPNEA ON EXERTION): Primary | ICD-10-CM

## 2024-11-18 DIAGNOSIS — Z86.0100 HISTORY OF COLON POLYPS: Primary | ICD-10-CM

## 2024-11-18 NOTE — TELEPHONE ENCOUNTER
----- Message from Jeyson Lozoya sent at 11/15/2024  3:49 PM EST -----  Please call the patient regarding his abnormal result.  Recommend consultation with cardiology since echocardiogram revealed area of ischemia if any worsening shortness of breath or chest pressure in the meantime to [facility] emergency department.  Please place cardiology referral order

## 2024-11-25 ENCOUNTER — OFFICE VISIT (OUTPATIENT)
Dept: CARDIOLOGY | Facility: CLINIC | Age: 79
End: 2024-11-25
Payer: MEDICARE

## 2024-11-25 VITALS
WEIGHT: 239 LBS | BODY MASS INDEX: 32.37 KG/M2 | RESPIRATION RATE: 16 BRPM | HEIGHT: 72 IN | SYSTOLIC BLOOD PRESSURE: 120 MMHG | HEART RATE: 98 BPM | DIASTOLIC BLOOD PRESSURE: 76 MMHG | OXYGEN SATURATION: 98 %

## 2024-11-25 DIAGNOSIS — E11.40 TYPE 2 DIABETES MELLITUS WITH DIABETIC NEUROPATHY, WITHOUT LONG-TERM CURRENT USE OF INSULIN: ICD-10-CM

## 2024-11-25 DIAGNOSIS — R06.02 SOB (SHORTNESS OF BREATH): Primary | ICD-10-CM

## 2024-11-25 DIAGNOSIS — I25.10 CORONARY ARTERY CALCIFICATION SEEN ON CT SCAN: ICD-10-CM

## 2024-11-25 DIAGNOSIS — I10 BENIGN ESSENTIAL HYPERTENSION: ICD-10-CM

## 2024-11-25 DIAGNOSIS — I25.10 CORONARY ARTERY DISEASE INVOLVING NATIVE CORONARY ARTERY OF NATIVE HEART WITHOUT ANGINA PECTORIS: ICD-10-CM

## 2024-11-25 DIAGNOSIS — I45.2 RBBB WITH LEFT ANTERIOR FASCICULAR BLOCK: ICD-10-CM

## 2024-11-25 PROBLEM — I25.810 CORONARY ARTERY DISEASE INVOLVING CORONARY BYPASS GRAFT OF NATIVE HEART WITHOUT ANGINA PECTORIS: Status: ACTIVE | Noted: 2024-11-25

## 2024-11-25 PROCEDURE — 93000 ELECTROCARDIOGRAM COMPLETE: CPT | Performed by: INTERNAL MEDICINE

## 2024-11-25 PROCEDURE — 3074F SYST BP LT 130 MM HG: CPT | Performed by: INTERNAL MEDICINE

## 2024-11-25 PROCEDURE — 3078F DIAST BP <80 MM HG: CPT | Performed by: INTERNAL MEDICINE

## 2024-11-25 PROCEDURE — 99204 OFFICE O/P NEW MOD 45 MIN: CPT | Performed by: INTERNAL MEDICINE

## 2024-11-25 RX ORDER — METOPROLOL SUCCINATE 25 MG/1
25 TABLET, EXTENDED RELEASE ORAL DAILY
Qty: 90 TABLET | Refills: 3 | Status: SHIPPED | OUTPATIENT
Start: 2024-11-25

## 2024-11-25 NOTE — PROGRESS NOTES
Subjective:     Encounter Date:11/25/24      Patient ID: Sen Paiz is a 79 y.o. male.    Chief Complaint:  History of Present Illness    Dear Dr. Lozoya,    I had the pleasure of seeing this patient in the office today for initial evaluation and consultation.  I appreciate that you sent him in to see us.  They come in today to be seen for coronary calcification and a recent abnormal nuclear perfusion study.    Patient has a history of emphysema.  Patient's had low-dose chest CTs performed since 2015.  These of uniformly demonstrated three-vessel coronary artery calcification.    Recently patient had a stress nuclear perfusion scan performed.  He is not have any chest discomfort but has been having worsening fatigability and dyspnea.  No wheezing.  Nuclear perfusion study demonstrated normal ejection fraction of 64% with a small area of mild intensity apical ischemia.  He was referred for further evaluation.  He is also known to have developed an elevated left hemidiaphragm since 2015.  He used to smoke but stopped in 2019.    No prior known cardiac history other than the coronary artery calcification this been documented.  No history coronary disease, no history of heart failure.  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  He does not feel anything.    No dizziness or lightheadedness, no presyncope or syncope.    The following portions of the patient's history were reviewed and updated as appropriate: allergies, current medications, past family history, past medical history, past social history, past surgical history and problem list.      ECG 12 Lead    Date/Time: 11/25/2024 4:07 PM  Performed by: Alirio Santana III, MD    Authorized by: Alirio Santana III, MD  Comparison: compared with previous ECG   Similar to previous ECG  Rhythm: sinus rhythm  Rate: normal  Conduction: right bundle branch block and left anterior fascicular block  ST Segments: ST  "segments normal  T Waves: T waves normal  QRS axis: left  Other: no other findings    Clinical impression: non-specific ECG             Objective:     Vitals:    11/25/24 1459   BP: 120/76   Pulse: 98   Resp: 16   SpO2: 98%   Weight: 108 kg (239 lb)   Height: 182.9 cm (72\")     Body mass index is 32.41 kg/m².      Vitals reviewed.   Constitutional:       General: Not in acute distress.     Appearance: Well-developed. Not diaphoretic.   Eyes:      General:         Right eye: No discharge.         Left eye: No discharge.      Conjunctiva/sclera: Conjunctivae normal.   HENT:      Head: Normocephalic and atraumatic.      Nose: Nose normal.   Neck:      Thyroid: No thyromegaly.      Trachea: No tracheal deviation.   Pulmonary:      Effort: Pulmonary effort is normal. No respiratory distress.      Breath sounds: Normal breath sounds. No stridor.   Chest:      Chest wall: Not tender to palpatation.   Cardiovascular:      Normal rate. Regular rhythm.      Murmurs: There is no murmur.      . No S3 gallop. No click. No rub.   Pulses:     Intact distal pulses.   Edema:     Peripheral edema absent.   Abdominal:      General: Bowel sounds are normal. There is no distension.      Palpations: Abdomen is soft. There is no abdominal mass.   Musculoskeletal: Normal range of motion.         General: No tenderness or deformity.      Cervical back: Normal range of motion and neck supple. Skin:     General: Skin is warm and dry.      Findings: No erythema or rash.   Neurological:      Mental Status: Alert.   Psychiatric:         Attention and Perception: Attention normal.         Data and records reviewed:     Lab Results   Component Value Date    GLUCOSE 166 (H) 10/18/2024    BUN 19 10/18/2024    CREATININE 0.89 10/18/2024     10/18/2024    K 4.8 10/18/2024     10/18/2024    CALCIUM 9.8 10/18/2024    PROTEINTOT 6.8 10/18/2024    ALBUMIN 4.4 10/18/2024    ALT 28 10/18/2024    AST 21 10/18/2024    ALKPHOS 94 10/18/2024    " BILITOT 0.7 10/18/2024    GLOB 2.4 10/18/2024    AGRATIO 1.8 10/18/2024    BCR 21.3 10/18/2024    ANIONGAP 8.7 10/18/2024    EGFR 88.3 10/18/2024     Lab Results   Component Value Date    CHOL 113 10/18/2024    CHOL 170 09/29/2021     Lab Results   Component Value Date    TRIG 259 (H) 10/18/2024    TRIG 139 10/20/2022    TRIG 350 (H) 09/29/2021     Lab Results   Component Value Date    HDL 28 (L) 10/18/2024    HDL 33 (L) 10/20/2022    HDL 33 (L) 09/29/2021     Lab Results   Component Value Date    LDL 45 10/18/2024    LDL 50 10/20/2022    LDL 80 09/29/2021     Lab Results   Component Value Date    VLDL 40 10/18/2024    VLDL 24 10/20/2022    VLDL 57 (H) 09/29/2021     Lab Results   Component Value Date    LDLHDL 1.19 10/18/2024    LDLHDL 2.03 09/29/2021    LDLHDL 1.42 11/16/2020       No radiology results for the last 90 days.          Assessment:          Diagnosis Plan   1. SOB (shortness of breath)  metoprolol succinate XL (TOPROL-XL) 25 MG 24 hr tablet      2. Coronary artery disease involving native coronary artery of native heart without angina pectoris        3. Coronary artery calcification seen on CT scan        4. Type 2 diabetes mellitus with diabetic neuropathy, without long-term current use of insulin        5. Benign essential hypertension               Plan:       1.  CAD-patient has coronary calcification dating back to 2015, now with a nuclear perfusion test that suggest a small area of mild ischemia in the apex.  No anginal symptoms.  Only some exertional fatigability and dyspnea.  Normal LV function.  I am going to have him start taking aspirin 81 mg daily.  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  2.  Mixed hyperlipidemia on lipid-lowering therapy, AST, ALT reviewed  3.  Diabetes mellitus with circulatory complication, risk factor modification is warranted  4.   Elevated left hemidiaphragm, follows closely with pulmonary  5.  Exertional dyspnea-we will see if low-dose beta-blocker therapy can help with this.    Thank you very much for allowing us to participate in the care of this pleasant patient.  Please don't hesitate to call if I can be of assistance in any way.      Current Outpatient Medications:     Accu-Chek FastClix Lancets misc, CHECK FASTING BLOOD SUGAR TWICE DAILY, Disp: 306 each, Rfl: 3    amitriptyline (ELAVIL) 10 MG tablet, Take 1 tablet by mouth Every Night., Disp: 30 tablet, Rfl: 1    atorvastatin (LIPITOR) 40 MG tablet, TAKE 1 TABLET BY MOUTH DAILY, Disp: 90 tablet, Rfl: 0    B-D UF III MINI PEN NEEDLES 31G X 5 MM misc, USE AS DIRECTED EVERY DAY, Disp: 100 each, Rfl: 3    Coenzyme Q10 (COQ-10) 400 MG capsule, Take 200 mg by mouth Daily., Disp: , Rfl:     Dulaglutide (Trulicity) 3 MG/0.5ML solution pen-injector, Inject 0.5 mL under the skin into the appropriate area as directed 1 (One) Time Per Week., Disp: 2 mL, Rfl: 5    empagliflozin (Jardiance) 25 MG tablet tablet, TAKE 1 TABLET BY MOUTH DAILY, Disp: 90 tablet, Rfl: 1    glucose blood (Accu-Chek Guide) test strip, USE AS DIRECTED, Disp: 100 each, Rfl: 11    Ibuprofen (Advil) 200 MG capsule, Take  by mouth., Disp: , Rfl:     Insulin Glargine (Lantus SoloStar) 100 UNIT/ML injection pen, Inject 50 Units under the skin into the appropriate area as directed Every Night., Disp: 45 mL, Rfl: 2    irbesartan (AVAPRO) 300 MG tablet, TAKE 1 TABLET BY MOUTH EVERY NIGHT, Disp: 90 tablet, Rfl: 1    METAMUCIL FIBER PO, Take  by mouth., Disp: , Rfl:     metFORMIN ER (GLUCOPHAGE-XR) 500 MG 24 hr tablet, TAKE 2 TABLETS BY MOUTH TWICE DAILY, Disp: 360 tablet, Rfl: 0    multivitamin with minerals tablet tablet, , Disp: , Rfl:     NON FORMULARY, Magnilite DB cream, Disp: , Rfl:     NON FORMULARY, Magnitlite Leg cream, Disp: , Rfl:     pregabalin (LYRICA) 200 MG capsule, TAKE 1 CAPSULE BY MOUTH TWICE DAILY, Disp: 60 capsule,  Rfl: 1    zolpidem (AMBIEN) 5 MG tablet, TAKE 1 TABLET BY MOUTH AT NIGHT AS NEEDED FOR SLEEP, Disp: 90 tablet, Rfl: 0    metoprolol succinate XL (TOPROL-XL) 25 MG 24 hr tablet, Take 1 tablet by mouth Daily., Disp: 90 tablet, Rfl: 3         Return in about 1 year (around 11/25/2025).

## 2024-12-04 RX ORDER — METFORMIN HYDROCHLORIDE 500 MG/1
1000 TABLET, EXTENDED RELEASE ORAL 2 TIMES DAILY
Qty: 360 TABLET | Refills: 0 | Status: SHIPPED | OUTPATIENT
Start: 2024-12-04

## 2024-12-09 ENCOUNTER — PATIENT ROUNDING (BHMG ONLY) (OUTPATIENT)
Dept: URGENT CARE | Facility: CLINIC | Age: 79
End: 2024-12-09
Payer: MEDICARE

## 2024-12-10 NOTE — ED NOTES
Thank you for letting us care for you at your recent visit to [facility]. We would love to hear about your experience with us. Was this the first time you have visited our location?    We’re always looking for ways to make our patients’ experience even better. Do you have any recommendations on ways we may improve?     Please be on the lookout for a survey about your recent visit from Jeronimo Soto via text or email. We would greatly appreciate if you could fill that out and turn it back in. We want your voice to be heard and we value your feedback.     Thank you for choosing [facility] for your healthcare needs. I appreciate you taking the time to respond!

## 2024-12-23 DIAGNOSIS — F51.01 PRIMARY INSOMNIA: ICD-10-CM

## 2024-12-23 RX ORDER — ZOLPIDEM TARTRATE 5 MG/1
5 TABLET ORAL NIGHTLY PRN
Qty: 90 TABLET | Refills: 0 | Status: SHIPPED | OUTPATIENT
Start: 2024-12-23

## 2025-01-07 DIAGNOSIS — E11.42 DIABETIC PERIPHERAL NEUROPATHY: ICD-10-CM

## 2025-01-07 RX ORDER — PREGABALIN 200 MG/1
200 CAPSULE ORAL 2 TIMES DAILY
Qty: 60 CAPSULE | OUTPATIENT
Start: 2025-01-07

## 2025-01-07 NOTE — TELEPHONE ENCOUNTER
Caller: Kia Paizon DARIN    Relationship: Self    Best call back number: [phone]      Requested Prescriptions:   Requested Prescriptions     Pending Prescriptions Disp Refills    pregabalin (LYRICA) 200 MG capsule 60 capsule 1     Sig: Take 1 capsule by mouth 2 (Two) Times a Day.        Pharmacy where request should be sent: [facility] #[id number] [facility] [id number] AT [facility] [phone] [facility] [phone]      Last office visit with prescribing clinician: 10/18/2024   Last telemedicine visit with prescribing clinician: Visit date not found   Next office visit with prescribing clinician: Visit date not found     Additional details provided by patient: HE ONLY HAS 3 PILLS LEFT    Does the patient have less than a 3 day supply:  [x] Yes  [] No    Would you like a call back once the refill request has been completed: [] Yes [x] No    If the office needs to give you a call back, can they leave a voicemail: [] Yes [x] No    Judi Sinha Rep   01/07/25 13:54 EST

## 2025-01-07 NOTE — TELEPHONE ENCOUNTER
Caller: Sen Paiz    Relationship: Self    Best call back number: [phone]      What medication are you requesting: Dulaglutide (Trulicity) 3 MG/0.5ML solution pen-injector       Have you had these symptoms before:    [x] Yes  [] No    Have you been treated for these symptoms before:   [x] Yes  [] No    If a prescription is needed, what is your preferred pharmacy and phone number: [id number] [address] AT [facility] [phone] [facility] [phone] FX

## 2025-01-08 RX ORDER — PREGABALIN 200 MG/1
200 CAPSULE ORAL 2 TIMES DAILY
Qty: 60 CAPSULE | Refills: 1 | OUTPATIENT
Start: 2025-01-08

## 2025-01-08 NOTE — TELEPHONE ENCOUNTER
Hub staff attempted to follow warm transfer process and was unsuccessful     Caller: Sen Paiz    Relationship to patient: Self    Best call back number: [phone]    Patient is needing: PT CALLING TO CHECK ON STATUS. THIS HAS BEEN DENIED MULTIPLE TIMES AND MARKED AS DUPLICATE, BUT THE ORIGINAL SCRIPT WAS NEVER REFILLED. PT IS ALMOST OUT OF MEDICATION. PT REQUEST CALL BACK ASAP.

## 2025-01-09 RX ORDER — PREGABALIN 200 MG/1
200 CAPSULE ORAL 2 TIMES DAILY
Qty: 60 CAPSULE | Refills: 1 | Status: SHIPPED | OUTPATIENT
Start: 2025-01-09

## 2025-02-03 RX ORDER — ATORVASTATIN CALCIUM 40 MG/1
40 TABLET, FILM COATED ORAL DAILY
Qty: 90 TABLET | Refills: 0 | Status: SHIPPED | OUTPATIENT
Start: 2025-02-03

## 2025-02-21 RX ORDER — BLOOD SUGAR DIAGNOSTIC
STRIP MISCELLANEOUS SEE ADMIN INSTRUCTIONS
Qty: 100 EACH | Refills: 2 | Status: SHIPPED | OUTPATIENT
Start: 2025-02-21

## 2025-02-28 ENCOUNTER — PREP FOR SURGERY (OUTPATIENT)
Dept: SURGERY | Facility: SURGERY CENTER | Age: 80
End: 2025-02-28
Payer: MEDICARE

## 2025-02-28 DIAGNOSIS — Z86.0100 HISTORY OF COLON POLYPS: Primary | ICD-10-CM

## 2025-03-03 ENCOUNTER — TELEPHONE (OUTPATIENT)
Dept: SURGERY | Facility: CLINIC | Age: 80
End: 2025-03-03
Payer: MEDICARE

## 2025-03-03 NOTE — TELEPHONE ENCOUNTER
----- Message from Mark Anthony Beckwith sent at 3/3/2025 12:38 PM EST -----  Regarding: RE: patient on Trulicty -- ok to hold for 7 days?  Yes please.     Thanks  Mark Anthony  ----- Message -----  From: Jer Judi Trinidad   Sent: 3/3/2025  12:19 PM EST  To: Mark Anthony Beckwith MD  Subject: patient on Trulicty -- ok to hold for 7 days?    I am going to call Mr. Paiz for rescheduling with 2 day prep.  Do you want me to tell him to hold his trulicity injection for 7 days?  ThanksMelissa  ----- Message -----  From: Mark Anthony Beckwith MD  Sent: 2/28/2025   1:21 PM EST  To: [facility]    Canceled preprocedure due to poor prep.  Please call and reschedule him with a 2-day prep.

## 2025-03-03 NOTE — TELEPHONE ENCOUNTER
Patient to be scheduled for 2 day prep: day 1 is miralax prep; day 2 is split nulytely prep to be called into his pharmacy.  Patient is to hold his trulicity injection for 1 week prior to schedule procedure.  Patient originally scheduled at [place] and is being offered 03/28 or 04/25 dates at [place] as possible rescheduling events.

## 2025-03-04 ENCOUNTER — TELEPHONE (OUTPATIENT)
Dept: SURGERY | Facility: CLINIC | Age: 80
End: 2025-03-04
Payer: MEDICARE

## 2025-03-04 NOTE — TELEPHONE ENCOUNTER
Called patient and left voice mail relaying information from Dr. Beckwith.  I had originally offered the patient 03/28 and 04/25 as possible dates for colonoscopy at [facility].  However, only the 04/25 appointment is available at this point.  Explained yesterday to patient that the 2 day prep he would need to do this time around would have one part as a pharmacy prescription that would be called in to his pharmacy and the other day would be similar to what he did previously.  Day 1- miralax; day 2 - split nulytely.  Asked patient to call back to schedule his colonoscopy at his convenience.

## 2025-03-04 NOTE — TELEPHONE ENCOUNTER
----- Message from Mark Anthony Beckwith sent at 3/3/2025  4:27 PM EST -----  Regarding: RE: problems with colonoscopy prep-procedure cancelled  Sorry to hear that it felt like he was being blamed for not following the prep.  Occasionally, someone will have an prep even with following the directions perfectly.  Sometimes the prep just does not work for some people.  Unfortunately, I think a 2-day prep gives him the best chance of having a good enough prep for the colonoscopy.  ----- Message -----  From: Head, Judi Trinidad Rep  Sent: 3/3/2025   2:03 PM EST  To: Mark Anthony Beckwith MD  Subject: problems with colonoscopy prep-procedure can#    I had a lengthy discussion with this patient; he was not happy with the cancellation of his procedure and felt like the nurses were blaming him for the failure and that they made him feel as if he didn't follow our procedure.  He stated he did the procedure exactly as given (split miralax prep). He had held his Trulicity injection since Mid February, so for more than 7 days.  He states that he did not have any bowel action until shortly before his arrival time, at which time he forced himself to go.  He did go to eat with his wife after cancelled procedure, but states that he had abnormal bowel function the entire weekend, and did not really return to normal bowel function until Sunday some time.  He states that he normally goes only every other day and that he has to use metamucil or other fiber twice a week (usually 1 tbs each time).  He is not happy about 2 day prep and is wondering if there is a stronger one day prep he could do.  When I described the normal two day prep (miralax day 1 and split nulytely day 2, he felt as if it was similar to the failed procedure and was worried that it wouldn't work either and was not happy with the idea of 2 days without food.  Do you have any thoughts or would you like to prescribe something differently?  I offered him 03/28 or 04/25 at  [place] as your first available appointments for that facility.  Thanks,  Melissa  ----- Message -----  From: Mark Anthony Beckwith MD  Sent: 2/28/2025   1:21 PM EST  To: [facility]    Canceled preprocedure due to poor prep.  Please call and reschedule him with a 2-day prep.

## 2025-03-07 DIAGNOSIS — E11.42 DIABETIC PERIPHERAL NEUROPATHY: ICD-10-CM

## 2025-03-07 RX ORDER — METFORMIN HYDROCHLORIDE 500 MG/1
1000 TABLET, EXTENDED RELEASE ORAL 2 TIMES DAILY
Qty: 360 TABLET | Refills: 0 | Status: SHIPPED | OUTPATIENT
Start: 2025-03-07

## 2025-03-07 RX ORDER — PREGABALIN 200 MG/1
200 CAPSULE ORAL 2 TIMES DAILY
Qty: 60 CAPSULE | Refills: 1 | Status: SHIPPED | OUTPATIENT
Start: 2025-03-07

## 2025-03-07 NOTE — TELEPHONE ENCOUNTER
Last seen on 10/18/24. Patient was requested to follow up around 11/18/2024. Needs updated Controlled Substance Agreement.

## 2025-04-02 DIAGNOSIS — E11.40 TYPE 2 DIABETES MELLITUS WITH DIABETIC NEUROPATHY, WITHOUT LONG-TERM CURRENT USE OF INSULIN: ICD-10-CM

## 2025-04-02 RX ORDER — EMPAGLIFLOZIN 25 MG/1
25 TABLET, FILM COATED ORAL DAILY
Qty: 90 TABLET | Refills: 1 | Status: SHIPPED | OUTPATIENT
Start: 2025-04-02

## 2025-04-02 RX ORDER — IRBESARTAN 300 MG/1
300 TABLET ORAL NIGHTLY
Qty: 90 TABLET | Refills: 1 | Status: SHIPPED | OUTPATIENT
Start: 2025-04-02

## 2025-04-06 DIAGNOSIS — E11.40 TYPE 2 DIABETES MELLITUS WITH DIABETIC NEUROPATHY, WITHOUT LONG-TERM CURRENT USE OF INSULIN: ICD-10-CM

## 2025-04-07 RX ORDER — DULAGLUTIDE 3 MG/.5ML
INJECTION, SOLUTION SUBCUTANEOUS
Qty: 2 ML | Refills: 1 | Status: SHIPPED | OUTPATIENT
Start: 2025-04-07

## 2025-04-16 ENCOUNTER — TELEPHONE (OUTPATIENT)
Dept: SURGERY | Facility: CLINIC | Age: 80
End: 2025-04-16
Payer: MEDICARE

## 2025-04-16 NOTE — TELEPHONE ENCOUNTER
I called and left a message for patient to confirm his cancellation for his procedure. I left him a detailed message stating I did get it cancelled for him.

## 2025-04-22 ENCOUNTER — TRANSCRIBE ORDERS (OUTPATIENT)
Dept: ADMINISTRATIVE | Facility: HOSPITAL | Age: 80
End: 2025-04-22
Payer: MEDICARE

## 2025-04-22 DIAGNOSIS — Z12.2 SCREENING FOR LUNG CANCER: Primary | ICD-10-CM

## 2025-05-05 DIAGNOSIS — E11.42 DIABETIC PERIPHERAL NEUROPATHY: ICD-10-CM

## 2025-05-05 RX ORDER — PREGABALIN 200 MG/1
200 CAPSULE ORAL 2 TIMES DAILY
Qty: 60 CAPSULE | Refills: 0 | Status: SHIPPED | OUTPATIENT
Start: 2025-05-05

## 2025-05-05 RX ORDER — FLURBIPROFEN SODIUM 0.3 MG/ML
SOLUTION/ DROPS OPHTHALMIC
Qty: 100 EACH | Refills: 3 | Status: SHIPPED | OUTPATIENT
Start: 2025-05-05

## 2025-05-05 RX ORDER — ATORVASTATIN CALCIUM 40 MG/1
40 TABLET, FILM COATED ORAL DAILY
Qty: 90 TABLET | Refills: 0 | Status: SHIPPED | OUTPATIENT
Start: 2025-05-05

## 2025-05-09 ENCOUNTER — EXTERNAL PBMM DATA (OUTPATIENT)
Dept: PHARMACY | Facility: OTHER | Age: 80
End: 2025-05-09
Payer: MEDICARE

## 2025-05-14 ENCOUNTER — HOSPITAL ENCOUNTER (OUTPATIENT)
Dept: CT IMAGING | Facility: HOSPITAL | Age: 80
Discharge: HOME | End: 2025-05-14
Admitting: INTERNAL MEDICINE
Payer: MEDICARE

## 2025-05-14 DIAGNOSIS — Z12.2 SCREENING FOR LUNG CANCER: ICD-10-CM

## 2025-05-14 PROCEDURE — 71271 CT THORAX LUNG CANCER SCR C-: CPT

## 2025-05-21 ENCOUNTER — TRANSCRIBE ORDERS (OUTPATIENT)
Dept: ADMINISTRATIVE | Facility: HOSPITAL | Age: 80
End: 2025-05-21
Payer: MEDICARE

## 2025-05-21 DIAGNOSIS — Z12.2 SCREENING FOR LUNG CANCER: Primary | ICD-10-CM

## 2025-05-28 RX ORDER — METFORMIN HYDROCHLORIDE 500 MG/1
1000 TABLET, EXTENDED RELEASE ORAL 2 TIMES DAILY
Qty: 360 TABLET | Refills: 0 | Status: SHIPPED | OUTPATIENT
Start: 2025-05-28

## 2025-06-04 DIAGNOSIS — E11.40 TYPE 2 DIABETES MELLITUS WITH DIABETIC NEUROPATHY, WITHOUT LONG-TERM CURRENT USE OF INSULIN: ICD-10-CM

## 2025-06-04 DIAGNOSIS — E11.42 DIABETIC PERIPHERAL NEUROPATHY: ICD-10-CM

## 2025-06-06 ENCOUNTER — TELEPHONE (OUTPATIENT)
Dept: INTERNAL MEDICINE | Facility: CLINIC | Age: 80
End: 2025-06-06

## 2025-06-06 DIAGNOSIS — E11.42 DIABETIC PERIPHERAL NEUROPATHY: ICD-10-CM

## 2025-06-06 RX ORDER — DULAGLUTIDE 3 MG/.5ML
INJECTION, SOLUTION SUBCUTANEOUS
Qty: 2 ML | Refills: 1 | Status: SHIPPED | OUTPATIENT
Start: 2025-06-06

## 2025-06-06 RX ORDER — PREGABALIN 200 MG/1
200 CAPSULE ORAL 2 TIMES DAILY
Qty: 60 CAPSULE | OUTPATIENT
Start: 2025-06-06

## 2025-06-06 RX ORDER — PREGABALIN 200 MG/1
200 CAPSULE ORAL 2 TIMES DAILY
Qty: 60 CAPSULE | Refills: 0 | Status: SHIPPED | OUTPATIENT
Start: 2025-06-06

## 2025-06-06 NOTE — TELEPHONE ENCOUNTER
Dr. Lozoya wanted patient to follow up in office for an appointment. Called patient and left message that he will need to come in for an appointment since the last time he was seen was 10/18/2024.

## 2025-06-06 NOTE — TELEPHONE ENCOUNTER
Caller: Sen Paiz    Relationship: Self    Best call back number: [phone]       What was the call regarding: PATIENT WAS INSTRUCTED TO MAKE APPT TO SEE DR. GALAVIZ BEFORE REFILLING THE PREGABALIN. PATIENT SCHEDULED FOR 6/16/25 AND IS OUT OF THE MEDICATION. HE WOULD LIKE THE REFILL APPROVED ASAP BECAUSE [facility] WILL CLOSE AT 6PM TODAY AND THEY ARE NOT OPEN ON THE WEEKEND. PLEASE ADVISE.

## 2025-06-11 DIAGNOSIS — F51.01 PRIMARY INSOMNIA: ICD-10-CM

## 2025-06-13 RX ORDER — ZOLPIDEM TARTRATE 5 MG/1
5 TABLET ORAL NIGHTLY PRN
Qty: 90 TABLET | Refills: 0 | Status: SHIPPED | OUTPATIENT
Start: 2025-06-13

## 2025-06-16 ENCOUNTER — OFFICE VISIT (OUTPATIENT)
Dept: INTERNAL MEDICINE | Facility: CLINIC | Age: 80
End: 2025-06-16
Payer: MEDICARE

## 2025-06-16 VITALS
TEMPERATURE: 98 F | HEART RATE: 90 BPM | OXYGEN SATURATION: 94 % | DIASTOLIC BLOOD PRESSURE: 64 MMHG | BODY MASS INDEX: 31.84 KG/M2 | HEIGHT: 72 IN | WEIGHT: 235.1 LBS | SYSTOLIC BLOOD PRESSURE: 100 MMHG

## 2025-06-16 DIAGNOSIS — E78.2 MIXED HYPERLIPIDEMIA: ICD-10-CM

## 2025-06-16 DIAGNOSIS — F51.01 PRIMARY INSOMNIA: ICD-10-CM

## 2025-06-16 DIAGNOSIS — I10 BENIGN ESSENTIAL HYPERTENSION: ICD-10-CM

## 2025-06-16 DIAGNOSIS — E11.40 TYPE 2 DIABETES MELLITUS WITH DIABETIC NEUROPATHY, WITHOUT LONG-TERM CURRENT USE OF INSULIN: Primary | ICD-10-CM

## 2025-06-16 RX ORDER — AMMONIUM LACTATE 12 G/100G
LOTION TOPICAL
COMMUNITY
Start: 2025-04-04

## 2025-06-16 RX ORDER — ASPIRIN 81 MG/1
81 TABLET, CHEWABLE ORAL DAILY
COMMUNITY

## 2025-06-16 RX ORDER — POLYETHYLENE GLYCOL-3350 AND ELECTROLYTES WITH FLAVOR PACK 240; 5.84; 2.98; 6.72; 22.72 G/278.26G; G/278.26G; G/278.26G; G/278.26G; G/278.26G
POWDER, FOR SOLUTION ORAL
COMMUNITY
Start: 2025-03-12

## 2025-06-16 NOTE — PROGRESS NOTES
"Chief Complaint  Diabetes    Subjective        Sen Paiz presents to [facility]    doctor requested a visit to review my medications  Treatment and/or Medications comments include: n/a   Additional information: n/a    Patient follows up for ongoing management of chronic problems of hyperlipidemia hypertension type 2 diabetes coronary artery disease increased exercise tolerance sedentary lifestyle   for .  We discussed his medications and adjustments that he has made including Lantus at 48 units he has no rashes no increased thirst he did try to undergo colorectal cancer screening with colonoscopy and had poor preparation  Has had consultation with cardiology revealing fixed apical defect no recommended intervention other than medical management  His blood pressure is well-controlled  He talked also about his intolerance to Ozempic and does not know if the Trulicity is giving him much benefit as he is not losing much weight he uses zolpidem for sleep without any unwanted side effects and like to continue the same he has taken pregabalin for neuropathy.  He also spoke about how he has difficulty changing lifestyle habits in order to better control his chronic medical problems this specifically includes his diet  He does not exercise regularly    Objective   Vital Signs:  /64   Pulse 90   Temp 98 °F (36.7 °C)   Ht 182.9 cm (72\")   Wt 107 kg (235 lb 1.6 oz)   SpO2 94%   BMI 31.89 kg/m²   Estimated body mass index is 31.89 kg/m² as calculated from the following:    Height as of this encounter: 182.9 cm (72\").    Weight as of this encounter: 107 kg (235 lb 1.6 oz).            Physical Exam  Constitutional:       Appearance: Normal appearance. He is not ill-appearing.   HENT:      Head: Normocephalic and atraumatic.   Eyes:      General: No scleral icterus.  Cardiovascular:      Rate and Rhythm: Normal rate and regular rhythm.   Pulmonary:      " Effort: Pulmonary effort is normal.      Breath sounds: Normal breath sounds.   Musculoskeletal:      Right lower leg: No edema.      Left lower leg: No edema.   Neurological:      Mental Status: He is alert.   Psychiatric:         Mood and Affect: Mood normal.         Behavior: Behavior normal.         Thought Content: Thought content normal.         Judgment: Judgment normal.        Result Review :    Common labs          10/18/2024    16:37 10/18/2024    16:40   Common Labs   Glucose  166    BUN  19    Creatinine  0.89    Sodium  140    Potassium  4.8    Chloride  103    Calcium  9.8    Albumin  4.4    Total Bilirubin  0.7    Alkaline Phosphatase  94    AST (SGOT)  21    ALT (SGPT)  28    Total Cholesterol  113    Triglycerides  259    HDL Cholesterol  28    LDL Cholesterol   45    Hemoglobin A1C  8.40    Microalbumin, Urine 29.1                 Assessment and Plan   Diagnoses and all orders for this visit:    1. Type 2 diabetes mellitus with diabetic neuropathy, without long-term current use of insulin (Primary)  -     Hemoglobin A1c    2. Benign essential hypertension    3. Mixed hyperlipidemia    4. Primary insomnia    Hypertension monitor blood pressure continue irbesartan metoprolol  Hyperlipidemia continue atorvastatin  Diabetes continue Jardiance metformin Lantus  Diabetic neuropathy continue pregabalin  Insomnia continue zolpidem       I spent 50 minutes caring for Sen on this date of service. This time includes time spent by me in the following activities:counseling and educating the patient/family/caregiver, ordering medications, tests, or procedures, documenting information in the medical record, and independently interpreting results and communicating that information with the patient/family/caregiver  Follow Up   Return in about 6 months (around 12/16/2025), or if symptoms worsen or fail to improve, for Recheck.  Patient was given instructions and counseling regarding his condition or for health  maintenance advice. Please see specific information pulled into the AVS if appropriate.

## 2025-06-17 LAB — HBA1C MFR BLD: 9 % (ref 4.8–5.6)

## 2025-06-27 ENCOUNTER — TELEPHONE (OUTPATIENT)
Dept: INTERNAL MEDICINE | Facility: CLINIC | Age: 80
End: 2025-06-27
Payer: MEDICARE

## 2025-06-30 ENCOUNTER — OFFICE VISIT (OUTPATIENT)
Dept: INTERNAL MEDICINE | Facility: CLINIC | Age: 80
End: 2025-06-30
Payer: MEDICARE

## 2025-06-30 VITALS
TEMPERATURE: 97.7 F | WEIGHT: 235.6 LBS | BODY MASS INDEX: 31.91 KG/M2 | SYSTOLIC BLOOD PRESSURE: 120 MMHG | OXYGEN SATURATION: 97 % | HEIGHT: 72 IN | DIASTOLIC BLOOD PRESSURE: 68 MMHG | HEART RATE: 95 BPM

## 2025-06-30 DIAGNOSIS — E11.40 TYPE 2 DIABETES MELLITUS WITH DIABETIC NEUROPATHY, WITH LONG-TERM CURRENT USE OF INSULIN: ICD-10-CM

## 2025-06-30 DIAGNOSIS — I10 BENIGN ESSENTIAL HYPERTENSION: ICD-10-CM

## 2025-06-30 DIAGNOSIS — Z79.4 TYPE 2 DIABETES MELLITUS WITH HYPERGLYCEMIA, WITH LONG-TERM CURRENT USE OF INSULIN: Primary | ICD-10-CM

## 2025-06-30 DIAGNOSIS — F51.01 PRIMARY INSOMNIA: ICD-10-CM

## 2025-06-30 DIAGNOSIS — Z79.4 TYPE 2 DIABETES MELLITUS WITH DIABETIC NEUROPATHY, WITH LONG-TERM CURRENT USE OF INSULIN: ICD-10-CM

## 2025-06-30 DIAGNOSIS — E11.65 TYPE 2 DIABETES MELLITUS WITH HYPERGLYCEMIA, WITH LONG-TERM CURRENT USE OF INSULIN: Primary | ICD-10-CM

## 2025-06-30 PROCEDURE — 3074F SYST BP LT 130 MM HG: CPT | Performed by: FAMILY MEDICINE

## 2025-06-30 PROCEDURE — 99214 OFFICE O/P EST MOD 30 MIN: CPT | Performed by: FAMILY MEDICINE

## 2025-06-30 PROCEDURE — 95249 CONT GLUC MNTR PT PROV EQP: CPT | Performed by: FAMILY MEDICINE

## 2025-06-30 PROCEDURE — 1160F RVW MEDS BY RX/DR IN RCRD: CPT | Performed by: FAMILY MEDICINE

## 2025-06-30 PROCEDURE — 1125F AMNT PAIN NOTED PAIN PRSNT: CPT | Performed by: FAMILY MEDICINE

## 2025-06-30 PROCEDURE — 1159F MED LIST DOCD IN RCRD: CPT | Performed by: FAMILY MEDICINE

## 2025-06-30 PROCEDURE — 3078F DIAST BP <80 MM HG: CPT | Performed by: FAMILY MEDICINE

## 2025-06-30 NOTE — PROGRESS NOTES
"Chief Complaint  Diabetes  Hypertension insomnia  Subjective        Sen Paiz presents to [facility]  Diabetes  Diabetes type:  Type 2  MedicAlert ID: no    Disease duration:  20 years  Associated symptoms:     foot paresthesias and polyuria      no blurred vision, no foot ulcerations, no polydipsia and no weight loss    Symptom course:  Worsening  Hypoglycemia symptoms:     no confusion, no headaches, no speech difficulty, no sweats and no tremors    Hypoglycemia complications:     no blackouts, no hospitalization, no nocturnal hypoglycemia, no required assistance and no required glucagon injection    Treatment compliance:  Most of the time  Insulin regimen:     Dose schedule:  At bedtime    Given by:  Patient    Injection sites:  Abdominal wall  Monitoring regimen:     Home blood tests:  1-2 x per day  Highest range:  140-180  Current diet:  Generally healthy and low salt  Meal planning:  None  Exercise:  Rarely  Eye exam current: yes    Sees podiatrist: yes    Additional information:  Nothing except my A1C was at 9 when the doctor checked in a week ago    Patient follows up for elevated A1c which surprised the patient as he is feels that his diet has been fairly stable his medications had not given him much side effects and morning blood sugars have varied from 104-160  No chest pain or shortness of breath  Richard benefit of the treatment of insomnia with zolpidem  Objective   Vital Signs:  /68 (BP Location: Left arm, Patient Position: Sitting)   Pulse 95   Temp 97.7 °F (36.5 °C) (Oral)   Ht 182.9 cm (72.01\")   Wt 107 kg (235 lb 9.6 oz)   SpO2 97%   BMI 31.95 kg/m²   Estimated body mass index is 31.95 kg/m² as calculated from the following:    Height as of this encounter: 182.9 cm (72.01\").    Weight as of this encounter: 107 kg (235 lb 9.6 oz).            Physical Exam  Vitals and nursing note reviewed.   Constitutional:       Appearance: He is obese. He is not " ill-appearing.   HENT:      Head: Normocephalic and atraumatic.   Cardiovascular:      Rate and Rhythm: Normal rate.   Pulmonary:      Effort: Pulmonary effort is normal.   Musculoskeletal:      Right lower leg: No edema.      Left lower leg: No edema.   Neurological:      Mental Status: He is alert.   Psychiatric:         Mood and Affect: Mood normal.         Behavior: Behavior normal.         Thought Content: Thought content normal.         Judgment: Judgment normal.        Result Review :    Common labs          10/18/2024    16:37 10/18/2024    16:40 6/16/2025    14:48   Common Labs   Glucose  166     BUN  19     Creatinine  0.89     Sodium  140     Potassium  4.8     Chloride  103     Calcium  9.8     Albumin  4.4     Total Bilirubin  0.7     Alkaline Phosphatase  94     AST (SGOT)  21     ALT (SGPT)  28     Total Cholesterol  113     Triglycerides  259     HDL Cholesterol  28     LDL Cholesterol   45     Hemoglobin A1C  8.40  9.0    Microalbumin, Urine 29.1                  Assessment and Plan   Diagnoses and all orders for this visit:    1. Type 2 diabetes mellitus with hyperglycemia, with long-term current use of insulin (Primary)    2. Type 2 diabetes mellitus with diabetic neuropathy, with long-term current use of insulin    3. Benign essential hypertension    4. Primary insomnia    Written prescription given for adelina 3+ to have continuous glucose monitoring given to run through durable medical equipment  Increase Lantus 3 units every 3 days until morning blood sugar less than 110 for 3 days in a row  Monitor blood pressure goals less than 140/90 continue irbesartan metoprolol  Discussed benefit and mechanism of continuous glucose monitoring     This patient has a PCP that is the continuing focal point for all health care services, and the patient sees this physician to be evaluated for diabetes. The inherent complexity that this code () captures is not in the clinical condition itself-- diabetes  "--but rather the cognitition of the continued responsibility of being the focal point for all needed services for this patient.\"     Follow Up   Return in about 1 month (around 7/30/2025), or if symptoms worsen or fail to improve.  Patient was given instructions and counseling regarding his condition or for health maintenance advice. Please see specific information pulled into the AVS if appropriate.             "

## 2025-07-07 DIAGNOSIS — E11.42 DIABETIC PERIPHERAL NEUROPATHY: ICD-10-CM

## 2025-07-07 RX ORDER — PREGABALIN 200 MG/1
200 CAPSULE ORAL 2 TIMES DAILY
Qty: 60 CAPSULE | Refills: 1 | Status: SHIPPED | OUTPATIENT
Start: 2025-07-07

## 2025-07-07 RX ORDER — INSULIN GLARGINE 100 [IU]/ML
INJECTION, SOLUTION SUBCUTANEOUS
Qty: 45 ML | Refills: 2 | Status: SHIPPED | OUTPATIENT
Start: 2025-07-07

## 2025-07-27 DIAGNOSIS — E11.40 TYPE 2 DIABETES MELLITUS WITH DIABETIC NEUROPATHY, WITHOUT LONG-TERM CURRENT USE OF INSULIN: ICD-10-CM

## 2025-07-28 RX ORDER — DULAGLUTIDE 3 MG/.5ML
INJECTION, SOLUTION SUBCUTANEOUS
Qty: 2 ML | Refills: 1 | Status: SHIPPED | OUTPATIENT
Start: 2025-07-28

## 2025-08-04 RX ORDER — ATORVASTATIN CALCIUM 40 MG/1
40 TABLET, FILM COATED ORAL DAILY
Qty: 90 TABLET | Refills: 0 | Status: SHIPPED | OUTPATIENT
Start: 2025-08-04

## 2025-08-27 RX ORDER — METFORMIN HYDROCHLORIDE 500 MG/1
1000 TABLET, EXTENDED RELEASE ORAL 2 TIMES DAILY
Qty: 360 TABLET | Refills: 0 | Status: SHIPPED | OUTPATIENT
Start: 2025-08-27